# Patient Record
Sex: MALE | Race: WHITE | NOT HISPANIC OR LATINO | Employment: OTHER | URBAN - METROPOLITAN AREA
[De-identification: names, ages, dates, MRNs, and addresses within clinical notes are randomized per-mention and may not be internally consistent; named-entity substitution may affect disease eponyms.]

---

## 2018-05-07 LAB
ALBUMIN SERPL BCP-MCNC: 4.1 G/DL (ref 3.5–5.7)
ALP SERPL-CCNC: 41 IU/L (ref 55–165)
ALT SERPL W P-5'-P-CCNC: 27 IU/L (ref 10–35)
ANION GAP SERPL CALCULATED.3IONS-SCNC: 13.4 MM/L
AST SERPL W P-5'-P-CCNC: 21 U/L (ref 8–27)
BACTERIA UR QL AUTO: ABNORMAL /HPF
BILIRUB SERPL-MCNC: 0.5 MG/DL (ref 0.3–1)
BILIRUB UR QL STRIP: NEGATIVE
BILIRUBIN DIRECT (HISTORICAL): 0.1 MG/DL (ref 0–0.2)
BUN SERPL-MCNC: 14 MG/DL (ref 7–25)
CALCIUM SERPL-MCNC: 9.4 MG/DL (ref 8.6–10.5)
CHLORIDE SERPL-SCNC: 107 MM/L (ref 98–107)
CHOLEST SERPL-MCNC: 132 MG/DL (ref 0–200)
CLARITY UR: CLEAR
CO2 SERPL-SCNC: 25 MM/L (ref 21–31)
COLOR UR: YELLOW
CREAT SERPL-MCNC: 0.88 MG/DL (ref 0.7–1.3)
EGFR (HISTORICAL): > 60 GFR
EGFR AFRICAN AMERICAN (HISTORICAL): > 60 GFR
GLUCOSE (HISTORICAL): 107 MG/DL (ref 65–99)
GLUCOSE UR STRIP-MCNC: NEGATIVE MG/DL
HDLC SERPL-MCNC: 37 MG/DL (ref 40–60)
HGB UR QL STRIP.AUTO: ABNORMAL
KETONES UR STRIP-MCNC: NEGATIVE MG/DL
LDLC SERPL CALC-MCNC: 74.5 MG/DL (ref 75–193)
LEUKOCYTE ESTERASE UR QL STRIP: NEGATIVE
NITRITE UR QL STRIP: NEGATIVE
NON-SQ EPI CELLS URNS QL MICRO: ABNORMAL /LPF
OSMOLALITY, SERUM (HISTORICAL): 282 MOSM (ref 262–291)
PH UR STRIP.AUTO: 6 [PH] (ref 4.5–8)
POTASSIUM SERPL-SCNC: 4.4 MM/L (ref 3.5–5.5)
PROSTATE SPECIFIC ANTIGEN FREE (HISTORICAL): 1.85 NG/ML (ref 0.2–4.9)
PROSTATE SPECIFIC ANTIGEN PERCENT FREE (HISTORICAL): 27.6 %
PROT UR STRIP-MCNC: NEGATIVE MG/DL
PSA (HISTORICAL): 6.71 NG/ML (ref 0–4)
RBC #/AREA URNS AUTO: ABNORMAL /HPF
SODIUM SERPL-SCNC: 141 MM/L (ref 134–143)
SP GR UR STRIP.AUTO: 1.01 (ref 1–1.03)
T4 TOTAL (HISTORICAL): 7.14 UG/DL (ref 6.1–12.2)
TOTAL PROTEIN (HISTORICAL): 6.4 G/DL (ref 6.4–8.9)
TRIGL SERPL-MCNC: 101 MG/DL (ref 44–166)
TSH SERPL DL<=0.05 MIU/L-ACNC: 2.18 UIU/M (ref 0.45–5.33)
UROBILINOGEN UR QL STRIP.AUTO: 0.2 EU/DL (ref 0.2–8)
VLDL CHOLESTEROL (HISTORICAL): 20 MG/DL (ref 5–51)
WBC #/AREA URNS AUTO: ABNORMAL /HPF

## 2018-06-26 ENCOUNTER — TELEPHONE (OUTPATIENT)
Dept: FAMILY MEDICINE CLINIC | Facility: CLINIC | Age: 68
End: 2018-06-26

## 2018-06-26 DIAGNOSIS — E78.5 HYPERLIPIDEMIA, UNSPECIFIED HYPERLIPIDEMIA TYPE: Primary | ICD-10-CM

## 2018-06-26 RX ORDER — CLONAZEPAM 0.5 MG/1
0.5 TABLET, ORALLY DISINTEGRATING ORAL DAILY
Qty: 30 TABLET | Refills: 0 | Status: SHIPPED | OUTPATIENT
Start: 2018-06-26 | End: 2018-09-13 | Stop reason: SDUPTHER

## 2018-06-26 RX ORDER — SIMVASTATIN 40 MG
40 TABLET ORAL
Qty: 30 TABLET | Refills: 2 | Status: SHIPPED | OUTPATIENT
Start: 2018-06-26 | End: 2018-09-27 | Stop reason: SDUPTHER

## 2018-06-26 RX ORDER — DOCUSATE SODIUM 100 MG/1
100 CAPSULE, LIQUID FILLED ORAL DAILY
Qty: 30 CAPSULE | Refills: 0 | Status: SHIPPED | OUTPATIENT
Start: 2018-06-26 | End: 2018-11-13 | Stop reason: SDUPTHER

## 2018-06-27 DIAGNOSIS — F41.9 ANXIETY: Primary | ICD-10-CM

## 2018-06-27 RX ORDER — CLONAZEPAM 0.5 MG/1
TABLET ORAL
Qty: 30 TABLET | Refills: 0 | Status: SHIPPED | OUTPATIENT
Start: 2018-06-27 | End: 2018-08-13 | Stop reason: SDUPTHER

## 2018-08-07 PROBLEM — E78.00 HYPERCHOLESTEROLEMIA: Status: ACTIVE | Noted: 2018-08-07

## 2018-08-07 PROBLEM — N40.0 BPH (BENIGN PROSTATIC HYPERPLASIA): Status: ACTIVE | Noted: 2018-08-07

## 2018-08-07 PROBLEM — K21.9 GERD (GASTROESOPHAGEAL REFLUX DISEASE): Status: ACTIVE | Noted: 2018-08-07

## 2018-08-07 RX ORDER — OMEPRAZOLE 20 MG/1
20 CAPSULE, DELAYED RELEASE ORAL
COMMUNITY

## 2018-08-07 RX ORDER — LEVOTHYROXINE SODIUM 0.07 MG/1
75 TABLET ORAL DAILY
COMMUNITY
End: 2018-08-28 | Stop reason: SDUPTHER

## 2018-08-13 DIAGNOSIS — E78.5 HYPERLIPIDEMIA, UNSPECIFIED HYPERLIPIDEMIA TYPE: ICD-10-CM

## 2018-08-13 DIAGNOSIS — F41.9 ANXIETY: ICD-10-CM

## 2018-08-13 RX ORDER — CLONAZEPAM 0.5 MG/1
0.5 TABLET ORAL
Qty: 30 TABLET | Refills: 0 | Status: SHIPPED | OUTPATIENT
Start: 2018-08-13 | End: 2018-08-14 | Stop reason: SDUPTHER

## 2018-08-14 DIAGNOSIS — F41.9 ANXIETY: ICD-10-CM

## 2018-08-14 RX ORDER — CLONAZEPAM 0.5 MG/1
TABLET ORAL
Qty: 30 TABLET | Refills: 0 | Status: SHIPPED | OUTPATIENT
Start: 2018-08-14 | End: 2018-09-13 | Stop reason: SDUPTHER

## 2018-08-28 DIAGNOSIS — E03.9 HYPOTHYROIDISM, UNSPECIFIED TYPE: Primary | ICD-10-CM

## 2018-08-28 RX ORDER — LEVOTHYROXINE SODIUM 0.07 MG/1
75 TABLET ORAL DAILY
Qty: 30 TABLET | Refills: 3 | Status: SHIPPED | OUTPATIENT
Start: 2018-08-28 | End: 2018-12-31 | Stop reason: SDUPTHER

## 2018-09-13 DIAGNOSIS — F41.9 ANXIETY: ICD-10-CM

## 2018-09-14 RX ORDER — CLONAZEPAM 0.5 MG/1
0.5 TABLET ORAL
Qty: 30 TABLET | Refills: 1 | Status: SHIPPED | OUTPATIENT
Start: 2018-09-14 | End: 2018-11-13 | Stop reason: SDUPTHER

## 2018-09-18 ENCOUNTER — OFFICE VISIT (OUTPATIENT)
Dept: FAMILY MEDICINE CLINIC | Facility: CLINIC | Age: 68
End: 2018-09-18
Payer: MEDICARE

## 2018-09-18 VITALS
HEIGHT: 65 IN | WEIGHT: 168 LBS | HEART RATE: 92 BPM | OXYGEN SATURATION: 98 % | TEMPERATURE: 97.5 F | RESPIRATION RATE: 16 BRPM | BODY MASS INDEX: 27.99 KG/M2

## 2018-09-18 DIAGNOSIS — E78.00 HYPERCHOLESTEROLEMIA: ICD-10-CM

## 2018-09-18 DIAGNOSIS — K21.00 GASTROESOPHAGEAL REFLUX DISEASE WITH ESOPHAGITIS: Primary | ICD-10-CM

## 2018-09-18 DIAGNOSIS — N40.0 BENIGN PROSTATIC HYPERPLASIA WITHOUT LOWER URINARY TRACT SYMPTOMS: ICD-10-CM

## 2018-09-18 DIAGNOSIS — E03.9 HYPOTHYROIDISM, UNSPECIFIED TYPE: ICD-10-CM

## 2018-09-18 PROCEDURE — 99214 OFFICE O/P EST MOD 30 MIN: CPT | Performed by: INTERNAL MEDICINE

## 2018-09-18 NOTE — PROGRESS NOTES
Assessment/Plan:  Gastroesophageal reflux disease  Well controlled with Prilosec 20 mg daily  Hypercholesterolemia  Patient is on Zocor fish oil and garlic tablet no side effects are reported  Benign prostatic hyperplasia  PSA was elevated but free PSA was normal he was referred to a urologist Dr Tiffany Gan  Who felt that prostate biopsy was not necessary because free PSA was normal  Patient gets:  Exam by Dr Lauren Kapoor will be due in November of this year the lab data from May  2018 was reviewed and discussed with the patient      Diagnoses and all orders for this visit:    Gastroesophageal reflux disease with esophagitis    Benign prostatic hyperplasia without lower urinary tract symptoms    Hypercholesterolemia    Hypothyroidism, unspecified type    Other orders  -     Probiotic Product (PROBIOTIC-10) CAPS; Take by mouth        Subjective:      Patient ID: Samara Treviño is a 79 y o  male      44-year-old white male is coming in for a routine follow-up visit  Patient has BPH and is PSA was elevated in May he was referred to Dr Townsend Flies the urologist the free PSA is normal he did not feel any in his necessity for prostate biopsy patient has no symptoms of prostatism at this point        The following portions of the patient's history were reviewed and updated as appropriate: allergies, current medications, past family history, past medical history, past social history, past surgical history and problem list       Current Outpatient Prescriptions:     aspirin 81 MG tablet, Take 81 mg by mouth, Disp: , Rfl:     clonazePAM (KlonoPIN) 0 5 mg tablet, Take 1 tablet (0 5 mg total) by mouth daily at bedtime, Disp: 30 tablet, Rfl: 1    docusate sodium (COLACE) 100 mg capsule, Take 1 capsule (100 mg total) by mouth daily, Disp: 30 capsule, Rfl: 0    GARLIC PO, Take by mouth daily, Disp: , Rfl:     levothyroxine 75 mcg tablet, Take 1 tablet (75 mcg total) by mouth daily, Disp: 30 tablet, Rfl: 3    Multiple Vitamins-Minerals (MULTIVITAMIN PO), Take by mouth daily, Disp: , Rfl:     Omega-3 Fatty Acids (FISH OIL PO), Take by mouth daily, Disp: , Rfl:     omeprazole (PriLOSEC) 20 mg delayed release capsule, Take 20 mg by mouth, Disp: , Rfl:     Probiotic Product (PROBIOTIC-10) CAPS, Take by mouth, Disp: , Rfl:     simvastatin (ZOCOR) 40 mg tablet, Take 1 tablet (40 mg total) by mouth daily at bedtime, Disp: 30 tablet, Rfl: 2    Past Medical History:   Diagnosis Date    Benign neoplasm of colon     Chronic rhinitis     Colon polyp     Diverticular disease of colon     Dysphagia     GERD (gastroesophageal reflux disease)     Hyperlipidemia     Internal hemorrhoids without complication     PSA (psoriatic arthritis) (HCC)     elevated    Restless leg syndrome     Thyroid nodule        Past Surgical History:   Procedure Laterality Date    COLONOSCOPY  09/22/2010    COLONOSCOPY  06/13/2007    COLONOSCOPY  05/27/2004    COLONOSCOPY  12/09/2015    GALLBLADDER SURGERY      OTHER SURGICAL HISTORY  11/2012    abcessed tooth    THYROIDECTOMY      TONSILLECTOMY      UPPER GASTROINTESTINAL ENDOSCOPY  10/30/2013       Social History       Patient Active Problem List   Diagnosis    GERD (gastroesophageal reflux disease)    Hypercholesterolemia    BPH (benign prostatic hyperplasia)           Review of Systems   Constitutional: Negative  HENT: Negative  Eyes: Negative  Respiratory: Negative  Cardiovascular: Negative  Gastrointestinal: Negative  Endocrine: Negative  Genitourinary: Negative  Musculoskeletal: Negative  Skin: Negative  Allergic/Immunologic: Negative  Neurological: Negative  Hematological: Negative  Psychiatric/Behavioral: Negative            Objective:      Pulse 92   Temp 97 5 °F (36 4 °C) (Tympanic)   Resp 16   Ht 5' 5" (1 651 m)   Wt 76 2 kg (168 lb)   SpO2 98%   BMI 27 96 kg/m²          Physical Exam   Constitutional: He is oriented to person, place, and time  He appears well-developed and well-nourished  HENT:   Head: Normocephalic  Mouth/Throat: Oropharynx is clear and moist    Eyes: Conjunctivae are normal  Pupils are equal, round, and reactive to light  Neck: Normal range of motion  Neck supple  Cardiovascular: Normal rate and normal heart sounds  Pulmonary/Chest: Effort normal and breath sounds normal    Abdominal: Soft  Bowel sounds are normal    Musculoskeletal: Normal range of motion  Neurological: He is alert and oriented to person, place, and time  Skin: Skin is warm and dry  No visits with results within 4 Month(s) from this visit  Latest known visit with results is:   Orders Only on 05/07/2018   Component Date Value Ref Range Status    Bacteria, UA 05/07/2018 FEW* NONE SEEN /HPF Final    WBC, UA 05/07/2018 NONE SEEN  U0-2;NONE /HPF Final    Color, UA 05/07/2018 YELLOW   Final    Clarity, UA 05/07/2018 CLEAR   Final    Specific Gravity, UA 05/07/2018 1 010  1 003 - 1 035 Final    pH, UA 05/07/2018 6 0  4 5 - 8 0 Final    Glucose, UA 05/07/2018 NEGATIVE  NEGATIVE;N Final    Bilirubin, UA 05/07/2018 NEGATIVE  NEGATIVE;N Final    Ketones, UA 05/07/2018 NEGATIVE  N;NEG;NEGATIVE Final    Protein, UA 05/07/2018 NEGATIVE  NEGATIVE;N Final    Urobilinogen, UA 05/07/2018 0 2  0 2 - 8 0 EU/DL Final    Nitrite, UA 05/07/2018 NEGATIVE  NEGATIVE;N;NEG Final    Blood, UA 05/07/2018 TRACE* NEGATIVE;N;NEG Final    Leukocytes, UA 05/07/2018 NEGATIVE  NEGATIVE;N;NEG Final    RBC, UA 05/07/2018 0 - 2  U0-2;NONE /HPF Final    Epithelial Cells 05/07/2018 FEW  NONE;FEW /LPF Final    Glucose 05/07/2018 107* 65 - 99 MG/DL Final    Comment: ADA fasting glucose recommendations:   Normal: 65-99; Impaired: 100-125;  Diagnostic for Diabetes mellitus: > 125; Target for Diabetes mellitus:       BUN 05/07/2018 14  7 - 25 MG/DL Final    Creatinine 05/07/2018 0 88  0 7 - 1 3 MG/DL Final    Comment: IDMS method performed  The drugs Metamizole, Sulfasalazine, and Sulfapyridine may interfere and  result in false low results   Sodium 05/07/2018 141  134 - 143 MM/L Final    Please note: Reference range change based on patient population   Potassium 05/07/2018 4 4  3 5 - 5 5 MM/L Final    Please note: Reference range change based on patient population   Chloride 05/07/2018 107  98 - 107 MM/L Final    CO2 05/07/2018 25  21 0 - 31 0 MM/L Final    Calcium 05/07/2018 9 4  8 6 - 10 5 MG/DL Final    Please note: Reference range change based on patient population   Anion Gap 05/07/2018 13 4  MM/L Final    OSMOLALITY, SERUM 05/07/2018 282  262 - 291 mOsm Final    EGFR (HISTORICAL) 05/07/2018 > 60  >60 GFR Final    Comment: This calculation follows the MDRD guidelines  Units are in mL/min/1 73 sq meters      eGFR African American 05/07/2018 > 60  >60 GFR Final    Total Protein 05/07/2018 6 4  6 4 - 8 9 G/DL Final    Albumin 05/07/2018 4 1  3 5 - 5 7 G/DL Final    Total Bilirubin 05/07/2018 0 5  0 3 - 1 0 MG/DL Final    BILIRUBIN DIRECT 05/07/2018 0 1  0 0 - 0 2 MG/DL Final    AST 05/07/2018 21  8 - 27 U/L Final    ALT 05/07/2018 27  10 - 35 IU/L Final    Alkaline Phosphatase 05/07/2018 41* 55 - 165 IU/L Final    Cholesterol 05/07/2018 132  0 - 200 MG/DL Final    Comment: <200----------------------------DESIRABLE  200 - 239---------------------BORDERLINE HIGH  240 OR GREATER-----HIGH  The drugs Metamizole, Sulfasalazine, and Sulfapyridine may interfere and  result in false low results   Triglycerides 05/07/2018 101  44 - 166 MG/DL Final    Comment: The drugs Metamizole, Sulfasalazine, and Sulfapyridine may interfere and  result in false low results        VLDL CHOLESTEROL 05/07/2018 20 0  5 - 51 MG/DL Final    HDL 05/07/2018 37* 40 - 60 MG/DL Final    Comment: The drugs Metamizole, Sulfasalazine, and Sulfapyridine may interfere and  result in false low results   LDL Calculated 05/07/2018 74 5* 75 - 193 MG/DL Final    TSH 3RD GENERATON 05/07/2018 2 18  0 45 - 5 33 UIU/M Final    T4 TOTAL 05/07/2018 7 14  6 1 - 12 2 UG/DL Final    PSA 05/07/2018 6 71* 0 0 - 4 0 NG/ML Final    PROSTATE SPECIFIC ANTIGEN FREE 05/07/2018 1 85  0 2 - 4 9 NG/ML Final    PROSTATE SPECIFIC ANTIGEN PERCENT * 05/07/2018 27 6  % Final       No results found

## 2018-09-24 ENCOUNTER — CLINICAL SUPPORT (OUTPATIENT)
Dept: FAMILY MEDICINE CLINIC | Facility: CLINIC | Age: 68
End: 2018-09-24
Payer: MEDICARE

## 2018-09-24 DIAGNOSIS — Z23 NEEDS FLU SHOT: Primary | ICD-10-CM

## 2018-09-24 PROCEDURE — 90662 IIV NO PRSV INCREASED AG IM: CPT

## 2018-09-24 PROCEDURE — G0008 ADMIN INFLUENZA VIRUS VAC: HCPCS

## 2018-09-27 DIAGNOSIS — E78.5 HYPERLIPIDEMIA, UNSPECIFIED HYPERLIPIDEMIA TYPE: ICD-10-CM

## 2018-09-28 RX ORDER — SIMVASTATIN 40 MG
40 TABLET ORAL
Qty: 30 TABLET | Refills: 2 | Status: SHIPPED | OUTPATIENT
Start: 2018-09-28 | End: 2019-05-09 | Stop reason: SDUPTHER

## 2018-11-08 ENCOUNTER — APPOINTMENT (OUTPATIENT)
Dept: LAB | Facility: HOSPITAL | Age: 68
End: 2018-11-08
Attending: INTERNAL MEDICINE
Payer: MEDICARE

## 2018-11-08 ENCOUNTER — TRANSCRIBE ORDERS (OUTPATIENT)
Dept: ADMINISTRATIVE | Facility: HOSPITAL | Age: 68
End: 2018-11-08

## 2018-11-08 DIAGNOSIS — R79.89 LOW THYROID STIMULATING HORMONE (TSH) LEVEL: Primary | ICD-10-CM

## 2018-11-08 DIAGNOSIS — R79.89 LOW THYROID STIMULATING HORMONE (TSH) LEVEL: ICD-10-CM

## 2018-11-08 LAB
ALBUMIN SERPL BCP-MCNC: 4.2 G/DL (ref 3.5–5.7)
ALP SERPL-CCNC: 47 U/L (ref 55–165)
ALT SERPL W P-5'-P-CCNC: 30 U/L (ref 7–52)
ANION GAP SERPL CALCULATED.3IONS-SCNC: 8 MMOL/L (ref 4–13)
AST SERPL W P-5'-P-CCNC: 24 U/L (ref 13–39)
BILIRUB DIRECT SERPL-MCNC: 0.1 MG/DL (ref 0–0.2)
BILIRUB SERPL-MCNC: 0.8 MG/DL (ref 0.2–1)
BUN SERPL-MCNC: 16 MG/DL (ref 7–25)
CALCIUM SERPL-MCNC: 9.3 MG/DL (ref 8.6–10.5)
CHLORIDE SERPL-SCNC: 105 MMOL/L (ref 98–107)
CHOLEST SERPL-MCNC: 130 MG/DL (ref 0–200)
CO2 SERPL-SCNC: 25 MMOL/L (ref 21–31)
CREAT SERPL-MCNC: 1 MG/DL (ref 0.7–1.3)
GFR SERPL CREATININE-BSD FRML MDRD: 78 ML/MIN/1.73SQ M
GLUCOSE P FAST SERPL-MCNC: 107 MG/DL (ref 65–99)
HDLC SERPL-MCNC: 34 MG/DL (ref 40–60)
LDLC SERPL CALC-MCNC: 76 MG/DL (ref 0–100)
NONHDLC SERPL-MCNC: 96 MG/DL
POTASSIUM SERPL-SCNC: 4.2 MMOL/L (ref 3.5–5.5)
PROT SERPL-MCNC: 6.8 G/DL (ref 6.4–8.9)
SODIUM SERPL-SCNC: 138 MMOL/L (ref 134–143)
T4 SERPL-MCNC: 6.9 UG/DL (ref 4.7–13.3)
TRIGL SERPL-MCNC: 101 MG/DL (ref 44–166)
TSH SERPL DL<=0.05 MIU/L-ACNC: 2.61 UIU/ML (ref 0.45–5.33)

## 2018-11-08 PROCEDURE — 84436 ASSAY OF TOTAL THYROXINE: CPT

## 2018-11-08 PROCEDURE — 36415 COLL VENOUS BLD VENIPUNCTURE: CPT

## 2018-11-08 PROCEDURE — 80048 BASIC METABOLIC PNL TOTAL CA: CPT

## 2018-11-08 PROCEDURE — 80076 HEPATIC FUNCTION PANEL: CPT

## 2018-11-08 PROCEDURE — 80061 LIPID PANEL: CPT

## 2018-11-08 PROCEDURE — 84443 ASSAY THYROID STIM HORMONE: CPT

## 2018-11-13 DIAGNOSIS — F41.9 ANXIETY: ICD-10-CM

## 2018-11-13 DIAGNOSIS — E78.5 HYPERLIPIDEMIA, UNSPECIFIED HYPERLIPIDEMIA TYPE: ICD-10-CM

## 2018-11-13 RX ORDER — DOCUSATE SODIUM 100 MG/1
100 CAPSULE, LIQUID FILLED ORAL DAILY
Qty: 30 CAPSULE | Refills: 3 | Status: SHIPPED | OUTPATIENT
Start: 2018-11-13 | End: 2019-03-07 | Stop reason: SDUPTHER

## 2018-11-13 RX ORDER — CLONAZEPAM 0.5 MG/1
0.5 TABLET ORAL
Qty: 30 TABLET | Refills: 0 | Status: SHIPPED | OUTPATIENT
Start: 2018-11-13 | End: 2018-12-11 | Stop reason: SDUPTHER

## 2018-12-11 DIAGNOSIS — F41.9 ANXIETY: ICD-10-CM

## 2018-12-11 RX ORDER — CLONAZEPAM 0.5 MG/1
0.5 TABLET ORAL
Qty: 30 TABLET | Refills: 0 | Status: SHIPPED | OUTPATIENT
Start: 2018-12-11 | End: 2019-01-10 | Stop reason: SDUPTHER

## 2018-12-19 ENCOUNTER — OFFICE VISIT (OUTPATIENT)
Dept: FAMILY MEDICINE CLINIC | Facility: CLINIC | Age: 68
End: 2018-12-19
Payer: MEDICARE

## 2018-12-19 VITALS
SYSTOLIC BLOOD PRESSURE: 120 MMHG | HEIGHT: 65 IN | RESPIRATION RATE: 16 BRPM | BODY MASS INDEX: 27.99 KG/M2 | WEIGHT: 168 LBS | HEART RATE: 89 BPM | OXYGEN SATURATION: 99 % | TEMPERATURE: 97.8 F | DIASTOLIC BLOOD PRESSURE: 68 MMHG

## 2018-12-19 DIAGNOSIS — Z23 PNEUMOCOCCAL VACCINATION GIVEN: ICD-10-CM

## 2018-12-19 DIAGNOSIS — R53.83 FATIGUE, UNSPECIFIED TYPE: ICD-10-CM

## 2018-12-19 DIAGNOSIS — K21.00 GASTROESOPHAGEAL REFLUX DISEASE WITH ESOPHAGITIS: ICD-10-CM

## 2018-12-19 DIAGNOSIS — N40.0 BENIGN PROSTATIC HYPERPLASIA WITHOUT LOWER URINARY TRACT SYMPTOMS: ICD-10-CM

## 2018-12-19 DIAGNOSIS — Z29.9 PREVENTIVE MEASURE: ICD-10-CM

## 2018-12-19 DIAGNOSIS — Z11.59 ENCOUNTER FOR HEPATITIS C SCREENING TEST FOR LOW RISK PATIENT: Primary | ICD-10-CM

## 2018-12-19 DIAGNOSIS — E78.00 HYPERCHOLESTEROLEMIA: ICD-10-CM

## 2018-12-19 DIAGNOSIS — Z23 NEED FOR PROPHYLACTIC VACCINATION AGAINST DIPHTHERIA-TETANUS-PERTUSSIS (DTP): ICD-10-CM

## 2018-12-19 PROCEDURE — 99214 OFFICE O/P EST MOD 30 MIN: CPT | Performed by: INTERNAL MEDICINE

## 2018-12-19 PROCEDURE — G0009 ADMIN PNEUMOCOCCAL VACCINE: HCPCS

## 2018-12-19 PROCEDURE — 90670 PCV13 VACCINE IM: CPT

## 2018-12-19 PROCEDURE — 90471 IMMUNIZATION ADMIN: CPT

## 2018-12-19 PROCEDURE — 90715 TDAP VACCINE 7 YRS/> IM: CPT

## 2018-12-19 RX ORDER — CLOBETASOL PROPIONATE 0.5 MG/G
OINTMENT TOPICAL AS NEEDED
COMMUNITY

## 2018-12-19 NOTE — PROGRESS NOTES
Vitals:    12/19/18 0804   BP: 120/68   BP Location: Right arm   Patient Position: Sitting   Cuff Size: Adult   Pulse: 89   Resp: 16   Temp: 97 8 °F (36 6 °C)   TempSrc: Tympanic   SpO2: 99%   Weight: 76 2 kg (168 lb)   Height: 5' 5" (1 651 m)       Assessment/Plan:fatigue nonspecific  Will get a CBC CMP lipid panel Lyme disease antibody  Hypercholesterolemia patient is on Zocor  Hypothyroidism patient is on levothyroxine 75 mcg daily  Benign prostatic hypertrophy follows up with the urologist    There are no diagnoses linked to this encounter  Subjective:      Patient ID: Maira Garcia is a 79 y o  male      HPI 53-year-old white male is complaining of generalized fatigue for the past 2 months no abdominal pain no chest pain no shortness of breathing no rectal bleeding no lymph node enlargement no fevers no insect bite or any tick bite just sleeps too much after 8 hr of sleep at night he likes to sleep in the daytime as well is here for annual Wellness visit also    The following portions of the patient's history were reviewed and updated as appropriate: allergies, current medications, past family history, past medical history, past social history, past surgical history and problem list       Current Outpatient Prescriptions:     aspirin 81 MG tablet, Take 81 mg by mouth, Disp: , Rfl:     CALCIUM/MAGNESIUM/ZINC FORMULA PO, Take 1 tablet by mouth daily, Disp: , Rfl:     clobetasol (TEMOVATE) 0 05 % ointment, Apply topically as needed, Disp: , Rfl:     clonazePAM (KlonoPIN) 0 5 mg tablet, Take 1 tablet (0 5 mg total) by mouth daily at bedtime, Disp: 30 tablet, Rfl: 0    docusate sodium (COLACE) 100 mg capsule, Take 1 capsule (100 mg total) by mouth daily, Disp: 30 capsule, Rfl: 3    GARLIC PO, Take by mouth daily, Disp: , Rfl:     levothyroxine 75 mcg tablet, Take 1 tablet (75 mcg total) by mouth daily, Disp: 30 tablet, Rfl: 3    Multiple Vitamins-Minerals (MULTIVITAMIN PO), Take by mouth daily, Disp: , Rfl:     Omega-3 Fatty Acids (FISH OIL PO), Take by mouth daily, Disp: , Rfl:     omeprazole (PriLOSEC) 20 mg delayed release capsule, Take 20 mg by mouth, Disp: , Rfl:     Probiotic Product (PROBIOTIC-10) CAPS, Take by mouth, Disp: , Rfl:     simvastatin (ZOCOR) 40 mg tablet, Take 1 tablet (40 mg total) by mouth daily at bedtime, Disp: 30 tablet, Rfl: 2    Past Medical History:   Diagnosis Date    Benign neoplasm of colon     Chronic rhinitis     Colon polyp     Diverticular disease of colon     Dysphagia     GERD (gastroesophageal reflux disease)     Hyperlipidemia     Internal hemorrhoids without complication     PSA (psoriatic arthritis) (HCC)     elevated    Restless leg syndrome     Thyroid nodule        Past Surgical History:   Procedure Laterality Date    COLONOSCOPY  09/22/2010    COLONOSCOPY  06/13/2007    COLONOSCOPY  05/27/2004    COLONOSCOPY  12/09/2015    GALLBLADDER SURGERY      OTHER SURGICAL HISTORY  11/2012    abcessed tooth    THYROIDECTOMY      TONSILLECTOMY      UPPER GASTROINTESTINAL ENDOSCOPY  10/30/2013       Social History       Patient Active Problem List   Diagnosis    GERD (gastroesophageal reflux disease)    Hypercholesterolemia    BPH (benign prostatic hyperplasia)           Review of Systems   Constitutional: Negative  HENT: Negative  Eyes: Negative  Respiratory: Negative  Cardiovascular: Negative  Gastrointestinal: Negative  Endocrine: Negative  Genitourinary: Negative  Musculoskeletal: Negative  Skin: Negative  Allergic/Immunologic: Negative  Neurological: Negative  Hematological: Negative  Psychiatric/Behavioral: Negative            Objective:      /68 (BP Location: Right arm, Patient Position: Sitting, Cuff Size: Adult)   Pulse 89   Temp 97 8 °F (36 6 °C) (Tympanic)   Resp 16   Ht 5' 5" (1 651 m)   Wt 76 2 kg (168 lb)   SpO2 99%   BMI 27 96 kg/m²          Physical Exam   Constitutional: He is oriented to person, place, and time  He appears well-developed and well-nourished  HENT:   Head: Normocephalic  Mouth/Throat: Oropharynx is clear and moist    Eyes: Pupils are equal, round, and reactive to light  Conjunctivae are normal    Neck: Normal range of motion  Neck supple  Cardiovascular: Normal rate and normal heart sounds  Pulmonary/Chest: Effort normal and breath sounds normal    Abdominal: Soft  Bowel sounds are normal    Musculoskeletal: Normal range of motion  Neurological: He is alert and oriented to person, place, and time  Skin: Skin is warm and dry  Appointment on 11/08/2018   Component Date Value Ref Range Status    Sodium 11/08/2018 138  134 - 143 mmol/L Final    Potassium 11/08/2018 4 2  3 5 - 5 5 mmol/L Final    Chloride 11/08/2018 105  98 - 107 mmol/L Final    CO2 11/08/2018 25  21 - 31 mmol/L Final    ANION GAP 11/08/2018 8  4 - 13 mmol/L Final    BUN 11/08/2018 16  7 - 25 mg/dL Final    Creatinine 11/08/2018 1 00  0 70 - 1 30 mg/dL Final    Standardized to IDMS reference method    Glucose, Fasting 11/08/2018 107* 65 - 99 mg/dL Final      Specimen collection should occur prior to Sulfasalazine administration due to the potential for falsely depressed results  Specimen collection should occur prior to Sulfapyridine administration due to the potential for falsely elevated results      Calcium 11/08/2018 9 3  8 6 - 10 5 mg/dL Final    eGFR 11/08/2018 78  ml/min/1 73sq m Final    Cholesterol 11/08/2018 130  0 - 200 mg/dL Final      Cholesterol:       Desirable         <200 mg/dl       Borderline         200-239 mg/dl       High              >239           Triglycerides 11/08/2018 101  44 - 166 mg/dL Final      Triglyceride:     Normal          <150 mg/dl     Borderline High 150-199 mg/dl     High            200-499 mg/dl        Very High       >499 mg/dl    Specimen collection should occur prior to N-Acetylcysteine or Metamizole administration due to the potential for falsely depressed results   HDL, Direct 11/08/2018 34* 40 - 60 mg/dL Final      HDL Cholesterol:       High    >60 mg/dL       Low     <41 mg/dL  Specimen collection should occur prior to Metamizole administration due to the potential for falsley depressed results   LDL Calculated 11/08/2018 76  0 - 100 mg/dL Final      LDL Cholesterol:     Optimal           <100 mg/dl     Near Optimal      100-129 mg/dl     Above Optimal       Borderline High 130-159 mg/dl       High            160-189 mg/dl       Very High       >189 mg/dl         This screening LDL is a calculated result  It does not have the accuracy of the Direct Measured LDL in the monitoring of patients with hyperlipidemia and/or statin therapy  Direct Measure LDL (BHC733) must be ordered separately in these patients   Non-HDL-Chol (CHOL-HDL) 11/08/2018 96  mg/dl Final    TSH 3RD GENERATON 11/08/2018 2 610  0 450 - 5 330 uIU/mL Final    Using supplements with high doses of biotin 20 to more than 300 times greater than the adequate daily intake for adults of 30 mcg/day as established by the [employer], can cause falsely depress results   Total Bilirubin 11/08/2018 0 80  0 20 - 1 00 mg/dL Final    Bilirubin, Direct 11/08/2018 0 10  0 00 - 0 20 mg/dL Final    Alkaline Phosphatase 11/08/2018 47* 55 - 165 U/L Final    AST 11/08/2018 24  13 - 39 U/L Final      Specimen collection should occur prior to Sulfasalazine administration due to the potential for falsely depressed results   ALT 11/08/2018 30  7 - 52 U/L Final      Specimen collection should occur prior to Sulfasalazine administration due to the potential for falsely depressed results   Total Protein 11/08/2018 6 8  6 4 - 8 9 g/dL Final    Albumin 11/08/2018 4 2  3 5 - 5 7 g/dL Final    T4 TOTAL 11/08/2018 6 9  4 7 - 13 3 ug/dL Final       No results found      Social History     Social History Narrative    Consumes on average 3 cups of regular coffee per day        Family History   Problem Relation Age of Onset    Colon cancer Father     Other Father         metastatic: lymph tissue, breast, thyroid, prostate, liver, lung    Other Family         malignant neoplasm of gastrointestinal disease       Past Surgical History:   Procedure Laterality Date    COLONOSCOPY  09/22/2010    COLONOSCOPY  06/13/2007    COLONOSCOPY  05/27/2004    COLONOSCOPY  12/09/2015    GALLBLADDER SURGERY      OTHER SURGICAL HISTORY  11/2012    abcessed tooth    THYROIDECTOMY      TONSILLECTOMY      UPPER GASTROINTESTINAL ENDOSCOPY  10/30/2013       Allergies   Allergen Reactions    Codeine

## 2018-12-19 NOTE — PROGRESS NOTES
Assessment and Plan:    Problem List Items Addressed This Visit     None        Health Maintenance Due   Topic Date Due    Hepatitis C Screening  1950    Depression Screening PHQ  1950    Medicare Annual Wellness Visit (AWV)  1950    DXA SCAN  1950    DTaP,Tdap,and Td Vaccines (1 - Tdap) 05/24/1973    CRC Screening: FOBTx3/FIT  05/24/2002    Fall Risk  05/24/2017    Pneumococcal PPSV23/PCV13 65+ Years / Low and Medium Risk (1 of 2 - PCV13) 05/24/2017         HPI:  Darryl Ward is a 79 y o  male here for his Subsequent Wellness Visit      Patient Active Problem List   Diagnosis    GERD (gastroesophageal reflux disease)    Hypercholesterolemia    BPH (benign prostatic hyperplasia)     Past Medical History:   Diagnosis Date    Benign neoplasm of colon     Chronic rhinitis     Colon polyp     Diverticular disease of colon     Dysphagia     GERD (gastroesophageal reflux disease)     Hyperlipidemia     Internal hemorrhoids without complication     PSA (psoriatic arthritis) (HCC)     elevated    Restless leg syndrome     Thyroid nodule      Past Surgical History:   Procedure Laterality Date    COLONOSCOPY  09/22/2010    COLONOSCOPY  06/13/2007    COLONOSCOPY  05/27/2004    COLONOSCOPY  12/09/2015    GALLBLADDER SURGERY      OTHER SURGICAL HISTORY  11/2012    abcessed tooth    THYROIDECTOMY      TONSILLECTOMY      UPPER GASTROINTESTINAL ENDOSCOPY  10/30/2013     Family History   Problem Relation Age of Onset    Colon cancer Father     Other Father         metastatic: lymph tissue, breast, thyroid, prostate, liver, lung    Other Family         malignant neoplasm of gastrointestinal disease     History   Smoking Status    Never Smoker   Smokeless Tobacco    Never Used     History   Alcohol Use No      History   Drug Use No       Current Outpatient Prescriptions   Medication Sig Dispense Refill    aspirin 81 MG tablet Take 81 mg by mouth      CALCIUM/MAGNESIUM/ZINC FORMULA PO Take 1 tablet by mouth daily      clobetasol (TEMOVATE) 0 05 % ointment Apply topically as needed      clonazePAM (KlonoPIN) 0 5 mg tablet Take 1 tablet (0 5 mg total) by mouth daily at bedtime 30 tablet 0    docusate sodium (COLACE) 100 mg capsule Take 1 capsule (100 mg total) by mouth daily 30 capsule 3    GARLIC PO Take by mouth daily      levothyroxine 75 mcg tablet Take 1 tablet (75 mcg total) by mouth daily 30 tablet 3    Multiple Vitamins-Minerals (MULTIVITAMIN PO) Take by mouth daily      Omega-3 Fatty Acids (FISH OIL PO) Take by mouth daily      omeprazole (PriLOSEC) 20 mg delayed release capsule Take 20 mg by mouth      Probiotic Product (PROBIOTIC-10) CAPS Take by mouth      simvastatin (ZOCOR) 40 mg tablet Take 1 tablet (40 mg total) by mouth daily at bedtime 30 tablet 2     No current facility-administered medications for this visit  Allergies   Allergen Reactions    Codeine      Immunization History   Administered Date(s) Administered    Influenza 12/05/2017    Influenza, high dose seasonal 0 5 mL 09/24/2018       Patient Care Team:  Vi Powers MD as PCP - General (Internal Medicine)    Medicare Screening Tests and Risk Assessments:  Last Medicare Wellness visit information reviewed, patient interviewed, no change since last AWV  Health Risk Assessment:  Patient rates overall health as good  Patient feels that their physical health rating is Same  Eyesight was rated as Same  Hearing was rated as Same  Patient feels that their emotional and mental health rating is Same  Pain experienced by patient in the last 7 days has been None  Patient states that he has experienced no weight loss or gain in last 6 months  Emotional/Mental Health:  Patient has been feeling nervous/anxious  PHQ-9 Depression Screening:    Frequency of the following problems over the past two weeks:      1   Little interest or pleasure in doing things: 0 - not at all 2  Feeling down, depressed, or hopeless: 0 - not at all  PHQ-2 Score: 0          Broken Bones/Falls: Fall Risk Assessment:    In the past year, patient has experienced: No history of falling in past year          Bladder/Bowel:  Patient has not leaked urine accidently in the last six months  Patient reports no loss of bowel control  Immunizations:  Patient has had a flu vaccination within the last year  Patient has not received a pneumonia shot  Patient has received a shingles shot  Patient has not received tetanus/diphtheria shot  Home Safety:  Patient does not have trouble with stairs inside or outside of their home  Patient currently reports that there are no safety hazards present in home, working smoke alarms, working carbon monoxide detectors  Preventative Screenings:   prostate cancer screen performed, colon cancer screen completed, cholesterol screen completed, no glaucoma eye exam completed    Nutrition:  Current diet: Regular with servings of the following:    Medications:  Patient is currently taking over-the-counter supplements  Patient is able to manage medications  Lifestyle Choices:  Patient reports no tobacco use  Patient has not smoked or used tobacco in the past   Patient reports no alcohol use  Patient drives a vehicle  Patient wears seat belt  Activities of Daily Living:  Can get out of bed by his or her self, able to dress self, able to make own meals, able to do own shopping, able to bathe self, can do own laundry/housekeeping, can manage own money, pay bills and track expenses    Previous Hospitalizations:  No hospitalization or ED visit in past 12 months        Advanced Directives:  Patient has decided on a power of   Patient has spoken to designated power of   Patient has completed advanced directive          Preventative Screening/Counseling:      Cardiovascular:      Counseling: Healthy Diet, Healthy Weight, Improve Cholesterol, Improve Blood Pressure and Improve Exercise Tolerance          Diabetes:      Counseling: Healthy Diet, Healthy Weight and Improve Physical Activity          Colorectal Cancer:      General: Screening Current      Comments: Colonoscopy 2 years ago by [facility] gastroenterology        Prostate Cancer:      General: Screening Current      Comments: Sees Dr jarquin and gets a PSA through his office        Osteoporosis:      General: Screening Not Indicated          AAA:      General: Risks and Benefits Discussed      Study: Screening US          Glaucoma:      General: Screening Current      Comments: Sees Dr Hakan Tovar the eye doctor had a cataract removed left eye in 2018        HIV:      General: Screening Not Indicated          Hepatitis C:      General: Risks and Benefits Discussed      Counseling: has received general HCV counseling        Advanced Directives:   Patient has living will for healthcare, has durable POA for healthcare, Information on ACP and/or AD provided  End of life assessment reviewed with patient  Provider agrees with end of life decisions

## 2018-12-31 ENCOUNTER — APPOINTMENT (OUTPATIENT)
Dept: LAB | Facility: HOSPITAL | Age: 68
End: 2018-12-31
Attending: INTERNAL MEDICINE
Payer: MEDICARE

## 2018-12-31 ENCOUNTER — TRANSCRIBE ORDERS (OUTPATIENT)
Dept: ADMINISTRATIVE | Facility: HOSPITAL | Age: 68
End: 2018-12-31

## 2018-12-31 DIAGNOSIS — R73.01 IMPAIRED FASTING GLUCOSE: Primary | ICD-10-CM

## 2018-12-31 DIAGNOSIS — E03.9 HYPOTHYROIDISM, UNSPECIFIED TYPE: ICD-10-CM

## 2018-12-31 DIAGNOSIS — R53.83 FATIGUE, UNSPECIFIED TYPE: ICD-10-CM

## 2018-12-31 DIAGNOSIS — Z11.59 ENCOUNTER FOR HEPATITIS C SCREENING TEST FOR LOW RISK PATIENT: ICD-10-CM

## 2018-12-31 LAB
ALBUMIN SERPL BCP-MCNC: 4.1 G/DL (ref 3.5–5.7)
ALP SERPL-CCNC: 48 U/L (ref 55–165)
ALT SERPL W P-5'-P-CCNC: 33 U/L (ref 7–52)
ANION GAP SERPL CALCULATED.3IONS-SCNC: 8 MMOL/L (ref 4–13)
AST SERPL W P-5'-P-CCNC: 25 U/L (ref 13–39)
BASOPHILS # BLD AUTO: 0 THOUSANDS/ΜL (ref 0–0.1)
BASOPHILS NFR BLD AUTO: 1 % (ref 0–1)
BILIRUB SERPL-MCNC: 0.5 MG/DL (ref 0.2–1)
BUN SERPL-MCNC: 14 MG/DL (ref 7–25)
CALCIUM SERPL-MCNC: 9.1 MG/DL (ref 8.6–10.5)
CHLORIDE SERPL-SCNC: 106 MMOL/L (ref 98–107)
CO2 SERPL-SCNC: 26 MMOL/L (ref 21–31)
CREAT SERPL-MCNC: 0.95 MG/DL (ref 0.7–1.3)
EOSINOPHIL # BLD AUTO: 0.2 THOUSAND/ΜL (ref 0–0.61)
EOSINOPHIL NFR BLD AUTO: 2 % (ref 0–6)
ERYTHROCYTE [DISTWIDTH] IN BLOOD BY AUTOMATED COUNT: 12.9 % (ref 11.6–15.1)
GFR SERPL CREATININE-BSD FRML MDRD: 83 ML/MIN/1.73SQ M
GLUCOSE P FAST SERPL-MCNC: 113 MG/DL (ref 65–99)
HCT VFR BLD AUTO: 47.9 % (ref 42–52)
HCV AB SER QL: NORMAL
HGB BLD-MCNC: 16.1 G/DL (ref 12–17)
LYMPHOCYTES # BLD AUTO: 1.8 THOUSANDS/ΜL (ref 0.6–4.47)
LYMPHOCYTES NFR BLD AUTO: 25 % (ref 14–44)
MCH RBC QN AUTO: 31.6 PG (ref 26.8–34.3)
MCHC RBC AUTO-ENTMCNC: 33.6 G/DL (ref 31.4–37.4)
MCV RBC AUTO: 94 FL (ref 82–98)
MONOCYTES # BLD AUTO: 0.6 THOUSAND/ΜL (ref 0.17–1.22)
MONOCYTES NFR BLD AUTO: 8 % (ref 4–12)
NEUTROPHILS # BLD AUTO: 4.7 THOUSANDS/ΜL (ref 1.85–7.62)
NEUTS SEG NFR BLD AUTO: 64 % (ref 43–75)
NRBC BLD AUTO-RTO: 0 /100 WBCS
PLATELET # BLD AUTO: 201 THOUSANDS/UL (ref 149–390)
PMV BLD AUTO: 7.9 FL (ref 8.9–12.7)
POTASSIUM SERPL-SCNC: 4.2 MMOL/L (ref 3.5–5.5)
PROT SERPL-MCNC: 6.4 G/DL (ref 6.4–8.9)
RBC # BLD AUTO: 5.09 MILLION/UL (ref 3.88–5.62)
SODIUM SERPL-SCNC: 140 MMOL/L (ref 134–143)
WBC # BLD AUTO: 7.3 THOUSAND/UL (ref 4.31–10.16)

## 2018-12-31 PROCEDURE — 80053 COMPREHEN METABOLIC PANEL: CPT

## 2018-12-31 PROCEDURE — 86803 HEPATITIS C AB TEST: CPT

## 2018-12-31 PROCEDURE — 85025 COMPLETE CBC W/AUTO DIFF WBC: CPT

## 2018-12-31 PROCEDURE — 36415 COLL VENOUS BLD VENIPUNCTURE: CPT

## 2018-12-31 PROCEDURE — 86618 LYME DISEASE ANTIBODY: CPT

## 2018-12-31 RX ORDER — LEVOTHYROXINE SODIUM 0.07 MG/1
75 TABLET ORAL DAILY
Qty: 30 TABLET | Refills: 2 | Status: SHIPPED | OUTPATIENT
Start: 2018-12-31 | End: 2019-04-09 | Stop reason: SDUPTHER

## 2018-12-31 NOTE — TELEPHONE ENCOUNTER
Pt called requesting a refill of Synthroid  Can you please send refill for pt to pharmacy on file? Thank you

## 2019-01-02 ENCOUNTER — TRANSCRIBE ORDERS (OUTPATIENT)
Dept: ADMINISTRATIVE | Facility: HOSPITAL | Age: 69
End: 2019-01-02

## 2019-01-02 ENCOUNTER — APPOINTMENT (OUTPATIENT)
Dept: LAB | Facility: HOSPITAL | Age: 69
End: 2019-01-02
Attending: INTERNAL MEDICINE
Payer: MEDICARE

## 2019-01-02 DIAGNOSIS — R73.01 IMPAIRED FASTING GLUCOSE: ICD-10-CM

## 2019-01-02 LAB
B BURGDOR IGG SER IA-ACNC: 0.09
B BURGDOR IGM SER IA-ACNC: 0.64
EST. AVERAGE GLUCOSE BLD GHB EST-MCNC: 131 MG/DL
HBA1C MFR BLD: 6.2 % (ref 4.2–6.3)

## 2019-01-02 PROCEDURE — 83036 HEMOGLOBIN GLYCOSYLATED A1C: CPT

## 2019-01-02 PROCEDURE — 36415 COLL VENOUS BLD VENIPUNCTURE: CPT

## 2019-01-10 DIAGNOSIS — F41.9 ANXIETY: ICD-10-CM

## 2019-01-10 RX ORDER — CLONAZEPAM 0.5 MG/1
0.5 TABLET ORAL
Qty: 30 TABLET | Refills: 0 | Status: SHIPPED | OUTPATIENT
Start: 2019-01-10 | End: 2019-02-07 | Stop reason: SDUPTHER

## 2019-02-06 ENCOUNTER — HOSPITAL ENCOUNTER (OUTPATIENT)
Dept: ULTRASOUND IMAGING | Facility: HOSPITAL | Age: 69
Discharge: HOME | End: 2019-02-06
Attending: INTERNAL MEDICINE
Payer: MEDICARE

## 2019-02-06 DIAGNOSIS — Z29.9 PREVENTIVE MEASURE: ICD-10-CM

## 2019-02-06 PROCEDURE — 76706 US ABDL AORTA SCREEN AAA: CPT

## 2019-02-07 DIAGNOSIS — F41.9 ANXIETY: ICD-10-CM

## 2019-02-07 RX ORDER — CLONAZEPAM 0.5 MG/1
0.5 TABLET ORAL
Qty: 30 TABLET | Refills: 0 | Status: SHIPPED | OUTPATIENT
Start: 2019-02-07 | End: 2019-03-07 | Stop reason: SDUPTHER

## 2019-03-07 DIAGNOSIS — F41.9 ANXIETY: ICD-10-CM

## 2019-03-07 DIAGNOSIS — E78.5 HYPERLIPIDEMIA, UNSPECIFIED HYPERLIPIDEMIA TYPE: ICD-10-CM

## 2019-03-07 RX ORDER — DOCUSATE SODIUM 100 MG/1
100 CAPSULE, LIQUID FILLED ORAL DAILY
Qty: 30 CAPSULE | Refills: 3 | Status: SHIPPED | OUTPATIENT
Start: 2019-03-07 | End: 2019-08-06 | Stop reason: SDUPTHER

## 2019-03-07 RX ORDER — CLONAZEPAM 0.5 MG/1
0.5 TABLET ORAL
Qty: 30 TABLET | Refills: 0 | Status: SHIPPED | OUTPATIENT
Start: 2019-03-07 | End: 2019-04-09 | Stop reason: SDUPTHER

## 2019-03-07 NOTE — TELEPHONE ENCOUNTER
Patient called requesting a refill of medication  Can you please send to pharmacy on file? Thank you      Requesting:    Refill on His Clonazepam 0 5mg 1 tab daily called into [facility]   Thank You kD

## 2019-03-17 ENCOUNTER — APPOINTMENT (EMERGENCY)
Dept: CT IMAGING | Facility: HOSPITAL | Age: 69
End: 2019-03-17
Payer: MEDICARE

## 2019-03-17 ENCOUNTER — HOSPITAL ENCOUNTER (EMERGENCY)
Facility: HOSPITAL | Age: 69
Discharge: HOME | End: 2019-03-17
Attending: FAMILY MEDICINE
Payer: MEDICARE

## 2019-03-17 VITALS
SYSTOLIC BLOOD PRESSURE: 146 MMHG | WEIGHT: 165 LBS | RESPIRATION RATE: 15 BRPM | BODY MASS INDEX: 27.49 KG/M2 | TEMPERATURE: 98.5 F | OXYGEN SATURATION: 97 % | DIASTOLIC BLOOD PRESSURE: 82 MMHG | HEIGHT: 65 IN | HEART RATE: 87 BPM

## 2019-03-17 DIAGNOSIS — R42 DIZZINESS: ICD-10-CM

## 2019-03-17 DIAGNOSIS — R42 VERTIGO: Primary | ICD-10-CM

## 2019-03-17 LAB
ANION GAP SERPL CALCULATED.3IONS-SCNC: 7 MMOL/L (ref 4–13)
BASOPHILS # BLD AUTO: 0.1 THOUSANDS/ΜL (ref 0–0.1)
BASOPHILS NFR BLD AUTO: 1 % (ref 0–1)
BILIRUB UR QL STRIP: NEGATIVE
BUN SERPL-MCNC: 14 MG/DL (ref 7–25)
CALCIUM SERPL-MCNC: 9.5 MG/DL (ref 8.6–10.5)
CHLORIDE SERPL-SCNC: 105 MMOL/L (ref 98–107)
CLARITY UR: CLEAR
CO2 SERPL-SCNC: 26 MMOL/L (ref 21–31)
COLOR UR: YELLOW
CREAT SERPL-MCNC: 0.98 MG/DL (ref 0.7–1.3)
EOSINOPHIL # BLD AUTO: 0.3 THOUSAND/ΜL (ref 0–0.61)
EOSINOPHIL NFR BLD AUTO: 4 % (ref 0–6)
ERYTHROCYTE [DISTWIDTH] IN BLOOD BY AUTOMATED COUNT: 12.9 % (ref 11.6–15.1)
GFR SERPL CREATININE-BSD FRML MDRD: 80 ML/MIN/1.73SQ M
GLUCOSE SERPL-MCNC: 122 MG/DL (ref 65–140)
GLUCOSE UR STRIP-MCNC: NEGATIVE MG/DL
HCT VFR BLD AUTO: 47.2 % (ref 42–52)
HGB BLD-MCNC: 16.3 G/DL (ref 12–17)
HGB UR QL STRIP.AUTO: NEGATIVE
INR PPP: 0.96 (ref 0.9–1.5)
KETONES UR STRIP-MCNC: ABNORMAL MG/DL
LEUKOCYTE ESTERASE UR QL STRIP: NEGATIVE
LYMPHOCYTES # BLD AUTO: 2.3 THOUSANDS/ΜL (ref 0.6–4.47)
LYMPHOCYTES NFR BLD AUTO: 31 % (ref 14–44)
MCH RBC QN AUTO: 32.1 PG (ref 26.8–34.3)
MCHC RBC AUTO-ENTMCNC: 34.5 G/DL (ref 31.4–37.4)
MCV RBC AUTO: 93 FL (ref 82–98)
MONOCYTES # BLD AUTO: 0.8 THOUSAND/ΜL (ref 0.17–1.22)
MONOCYTES NFR BLD AUTO: 10 % (ref 4–12)
NEUTROPHILS # BLD AUTO: 3.9 THOUSANDS/ΜL (ref 1.85–7.62)
NEUTS SEG NFR BLD AUTO: 54 % (ref 43–75)
NITRITE UR QL STRIP: NEGATIVE
NRBC BLD AUTO-RTO: 0 /100 WBCS
PH UR STRIP.AUTO: 7.5 [PH]
PLATELET # BLD AUTO: 190 THOUSANDS/UL (ref 149–390)
PMV BLD AUTO: 8.3 FL (ref 8.9–12.7)
POTASSIUM SERPL-SCNC: 3.7 MMOL/L (ref 3.5–5.5)
PROT UR STRIP-MCNC: NEGATIVE MG/DL
PROTHROMBIN TIME: 11 SECONDS (ref 10.2–13)
RBC # BLD AUTO: 5.07 MILLION/UL (ref 3.88–5.62)
SODIUM SERPL-SCNC: 138 MMOL/L (ref 134–143)
SP GR UR STRIP.AUTO: 1.02 (ref 1–1.03)
UROBILINOGEN UR QL STRIP.AUTO: 0.2 E.U./DL
WBC # BLD AUTO: 7.3 THOUSAND/UL (ref 4.31–10.16)

## 2019-03-17 PROCEDURE — 85610 PROTHROMBIN TIME: CPT | Performed by: FAMILY MEDICINE

## 2019-03-17 PROCEDURE — 85025 COMPLETE CBC W/AUTO DIFF WBC: CPT | Performed by: FAMILY MEDICINE

## 2019-03-17 PROCEDURE — 96361 HYDRATE IV INFUSION ADD-ON: CPT

## 2019-03-17 PROCEDURE — 81003 URINALYSIS AUTO W/O SCOPE: CPT | Performed by: FAMILY MEDICINE

## 2019-03-17 PROCEDURE — 80048 BASIC METABOLIC PNL TOTAL CA: CPT | Performed by: FAMILY MEDICINE

## 2019-03-17 PROCEDURE — 93005 ELECTROCARDIOGRAM TRACING: CPT

## 2019-03-17 PROCEDURE — 96375 TX/PRO/DX INJ NEW DRUG ADDON: CPT

## 2019-03-17 PROCEDURE — 99284 EMERGENCY DEPT VISIT MOD MDM: CPT

## 2019-03-17 PROCEDURE — 36415 COLL VENOUS BLD VENIPUNCTURE: CPT | Performed by: FAMILY MEDICINE

## 2019-03-17 PROCEDURE — 96374 THER/PROPH/DIAG INJ IV PUSH: CPT

## 2019-03-17 PROCEDURE — 70450 CT HEAD/BRAIN W/O DYE: CPT

## 2019-03-17 RX ORDER — MECLIZINE HCL 12.5 MG/1
25 TABLET ORAL ONCE
Status: COMPLETED | OUTPATIENT
Start: 2019-03-17 | End: 2019-03-17

## 2019-03-17 RX ORDER — MECLIZINE HYDROCHLORIDE 25 MG/1
50 TABLET ORAL 3 TIMES DAILY PRN
Qty: 18 TABLET | Refills: 0 | Status: SHIPPED | OUTPATIENT
Start: 2019-03-17 | End: 2019-08-06 | Stop reason: SDUPTHER

## 2019-03-17 RX ORDER — ONDANSETRON 2 MG/ML
4 INJECTION INTRAMUSCULAR; INTRAVENOUS ONCE
Status: COMPLETED | OUTPATIENT
Start: 2019-03-17 | End: 2019-03-17

## 2019-03-17 RX ORDER — DIPHENHYDRAMINE HYDROCHLORIDE 50 MG/ML
25 INJECTION INTRAMUSCULAR; INTRAVENOUS ONCE
Status: COMPLETED | OUTPATIENT
Start: 2019-03-17 | End: 2019-03-17

## 2019-03-17 RX ADMIN — MECLIZINE 25 MG: 12.5 TABLET ORAL at 16:53

## 2019-03-17 RX ADMIN — DIPHENHYDRAMINE HYDROCHLORIDE 25 MG: 50 INJECTION, SOLUTION INTRAMUSCULAR; INTRAVENOUS at 18:34

## 2019-03-17 RX ADMIN — ONDANSETRON 4 MG: 2 INJECTION INTRAMUSCULAR; INTRAVENOUS at 16:53

## 2019-03-17 RX ADMIN — SODIUM CHLORIDE 1000 ML: 0.9 INJECTION, SOLUTION INTRAVENOUS at 18:20

## 2019-03-17 RX ADMIN — SODIUM CHLORIDE 1000 ML: 0.9 INJECTION, SOLUTION INTRAVENOUS at 16:53

## 2019-03-17 NOTE — ED PROVIDER NOTES
History  Chief Complaint   Patient presents with    Dizziness     onset was yesterday, intermittently when getting up quickly or moving head quickly   Facial Numbness     left side intermittenty for several years       History provided by:  Patient   used: No    Dizziness   Quality:  Head spinning and vertigo  Severity:  Moderate  Onset quality:  Gradual  Duration:  2 days  Timing:  Constant  Progression:  Worsening  Chronicity:  Chronic  Context: bending over, head movement and standing up    Relieved by:  None tried  Ineffective treatments:  None tried  Associated symptoms: no headaches, no hearing loss, no nausea, no palpitations, no syncope, no tinnitus, no vision changes, no vomiting and no weakness    Risk factors: hx of vertigo        This 61-year-old male with history of dizziness and facial numbness for years presented to ED with complain of worsening dizziness since yesterday  Patient states he had 3-4 episode of dizziness since yesterday and 3 episodes today which prompted this ED visit  Patient states he feels like the room is spinning or he denies any nausea vomiting at this time  Denies any blurry vision double vision  He states he dizziness is worse when he is getting up from a sitting position  He states he did not take anything for dizziness  He denies any headache at this time  Prior to Admission Medications   Prescriptions Last Dose Informant Patient Reported? Taking?    CALCIUM/MAGNESIUM/ZINC FORMULA PO   Yes No   Sig: Take 1 tablet by mouth daily   GARLIC PO   Yes No   Sig: Take by mouth daily   Multiple Vitamins-Minerals (MULTIVITAMIN PO)   Yes No   Sig: Take by mouth daily   Omega-3 Fatty Acids (FISH OIL PO)   Yes No   Sig: Take by mouth daily   Probiotic Product (PROBIOTIC-10) CAPS   Yes No   Sig: Take by mouth   aspirin 81 MG tablet   Yes No   Sig: Take 81 mg by mouth   clobetasol (TEMOVATE) 0 05 % ointment   Yes No   Sig: Apply topically as needed   clonazePAM (KlonoPIN) 0 5 mg tablet   No No   Sig: Take 1 tablet (0 5 mg total) by mouth daily at bedtime   docusate sodium (COLACE) 100 mg capsule   No No   Sig: Take 1 capsule (100 mg total) by mouth daily   levothyroxine 75 mcg tablet   No No   Sig: Take 1 tablet (75 mcg total) by mouth daily   omeprazole (PriLOSEC) 20 mg delayed release capsule   Yes No   Sig: Take 20 mg by mouth   simvastatin (ZOCOR) 40 mg tablet   No No   Sig: Take 1 tablet (40 mg total) by mouth daily at bedtime      Facility-Administered Medications: None       Past Medical History:   Diagnosis Date    Benign neoplasm of colon     Chronic rhinitis     Colon polyp     Diverticular disease of colon     Dysphagia     GERD (gastroesophageal reflux disease)     Hyperlipidemia     Internal hemorrhoids without complication     PSA (psoriatic arthritis) (HCC)     elevated    Restless leg syndrome     Thyroid nodule        Past Surgical History:   Procedure Laterality Date    COLONOSCOPY  09/22/2010    COLONOSCOPY  06/13/2007    COLONOSCOPY  05/27/2004    COLONOSCOPY  12/09/2015    GALLBLADDER SURGERY      OTHER SURGICAL HISTORY  11/2012    abcessed tooth    THYROIDECTOMY      TONSILLECTOMY      UPPER GASTROINTESTINAL ENDOSCOPY  10/30/2013       Family History   Problem Relation Age of Onset    Colon cancer Father     Other Father         metastatic: lymph tissue, breast, thyroid, prostate, liver, lung    Other Family         malignant neoplasm of gastrointestinal disease     I have reviewed and agree with the history as documented  Social History     Tobacco Use    Smoking status: Never Smoker    Smokeless tobacco: Never Used   Substance Use Topics    Alcohol use: No    Drug use: No        Review of Systems   HENT: Negative for hearing loss and tinnitus  Cardiovascular: Negative for palpitations and syncope  Gastrointestinal: Negative for nausea and vomiting  Neurological: Positive for dizziness   Negative for weakness and headaches  Physical Exam  Physical Exam   Constitutional: He is oriented to person, place, and time  He appears well-developed and well-nourished  HENT:   Nose: Nose normal    Mouth/Throat: Oropharynx is clear and moist  No oropharyngeal exudate  Eyes: Pupils are equal, round, and reactive to light  Conjunctivae and EOM are normal  No scleral icterus  Neck: Normal range of motion  Neck supple  No JVD present  No tracheal deviation present  Cardiovascular: Normal rate, regular rhythm and normal heart sounds  No murmur heard  Pulmonary/Chest: Effort normal and breath sounds normal  No respiratory distress  He has no wheezes  He has no rales  Abdominal: Soft  Bowel sounds are normal  There is no tenderness  There is no guarding  Musculoskeletal: Normal range of motion  He exhibits no edema or tenderness  Neurological: He is alert and oriented to person, place, and time  No cranial nerve deficit or sensory deficit  He exhibits normal muscle tone  5/5 motor, nl sens   Skin: Skin is warm and dry  Psychiatric: He has a normal mood and affect  His behavior is normal    Nursing note and vitals reviewed    NIHSS:0    Vital Signs  ED Triage Vitals [03/17/19 1643]   Temperature Pulse Respirations Blood Pressure SpO2   98 5 °F (36 9 °C) 91 16 162/84 98 %      Temp Source Heart Rate Source Patient Position - Orthostatic VS BP Location FiO2 (%)   Tympanic Monitor Sitting Left arm --      Pain Score       No Pain           Vitals:    03/17/19 1643 03/17/19 1805   BP: 162/84 150/88   Pulse: 91 86   Patient Position - Orthostatic VS: Sitting Sitting       qSOFA     Row Name 03/17/19 1805 03/17/19 1648 03/17/19 1643          Altered mental status GCS < 15  --  0  --      Respiratory Rate > / =22  0  --  0      Systolic BP < / =638  0  --  0      Q Sofa Score  0  0  0            Visual Acuity  Visual Acuity      Most Recent Value   L Pupil Size (mm)  3   R Pupil Size (mm)  3          ED Medications  Medications   sodium chloride 0 9 % bolus 1,000 mL (1,000 mL Intravenous New Bag 3/17/19 1820)   sodium chloride 0 9 % bolus 1,000 mL (0 mL Intravenous Stopped 3/17/19 1805)   ondansetron (ZOFRAN) injection 4 mg (4 mg Intravenous Given 3/17/19 1653)   meclizine (ANTIVERT) tablet 25 mg (25 mg Oral Given 3/17/19 1653)   diphenhydrAMINE (BENADRYL) injection 25 mg (25 mg Intravenous Given 3/17/19 1834)       Diagnostic Studies  Results Reviewed     Procedure Component Value Units Date/Time    UA w Reflex to Microscopic [[id number]]  (Abnormal) Collected:  03/17/19 1714    Lab Status:  Final result Specimen:  Urine, Clean Catch Updated:  03/17/19 1723     Color, UA Yellow     Clarity, UA Clear     Specific [place], UA 1 020     pH, UA 7 5     Leukocytes, UA Negative     Nitrite, UA Negative     Protein, UA Negative mg/dl      Glucose, UA Negative mg/dl      Ketones, UA Trace mg/dl      Urobilinogen, UA 0 2 E U /dl      Bilirubin, UA Negative     Blood, UA Negative    Basic metabolic panel [[id number]] Collected:  03/17/19 1650    Lab Status:  Final result Specimen:  Blood from Arm, Right Updated:  03/17/19 1719     Sodium 138 mmol/L      Potassium 3 7 mmol/L      Chloride 105 mmol/L      CO2 26 mmol/L      ANION GAP 7 mmol/L      BUN 14 mg/dL      Creatinine 0 98 mg/dL      Glucose 122 mg/dL      Calcium 9 5 mg/dL      eGFR 80 ml/min/1 73sq m     Narrative:       National Kidney Disease Education Program recommendations are as follows:  GFR calculation is accurate only with a steady state creatinine  Chronic Kidney disease less than 60 ml/min/1 73 sq  meters  Kidney failure less than 15 ml/min/1 73 sq  meters      Protime-INR [[id number]]  (Normal) Collected:  03/17/19 1650    Lab Status:  Final result Specimen:  Blood from Arm, Right Updated:  03/17/19 1709     Protime 11 0 seconds      INR 0 96    CBC and differential [[id number]]  (Abnormal) Collected:  03/17/19 1650    Lab Status:  Final result Specimen: Blood from Arm, Right Updated:  03/17/19 1659     WBC 7 30 Thousand/uL      RBC 5 07 Million/uL      Hemoglobin 16 3 g/dL      Hematocrit 47 2 %      MCV 93 fL      MCH 32 1 pg      MCHC 34 5 g/dL      RDW 12 9 %      MPV 8 3 fL      Platelets 688 Thousands/uL      nRBC 0 /100 WBCs      Neutrophils Relative 54 %      Lymphocytes Relative 31 %      Monocytes Relative 10 %      Eosinophils Relative 4 %      Basophils Relative 1 %      Neutrophils Absolute 3 90 Thousands/µL      Lymphocytes Absolute 2 30 Thousands/µL      Monocytes Absolute 0 80 Thousand/µL      Eosinophils Absolute 0 30 Thousand/µL      Basophils Absolute 0 10 Thousands/µL                  CT head without contrast   Final Result by Karin Sim MD (03/17 1738)      No acute intracranial abnormality  Workstation performed: [id number]                    Procedures  Procedures       Phone Contacts  ED Phone Contact    ED Course  ED Course as of Mar 17 1858   Shefali Mojica Mar 17, 2019   2102  Patient states that initially was feeling somewhat better after medication but dizziness the is worse after he  moved from his bed  Will try Benadryl  1848 Pt care is being transfer to Dr Chanelle Cintron  MDM    Disposition  Final diagnoses:   Vertigo   Dizziness     Time reflects when diagnosis was documented in both MDM as applicable and the Disposition within this note     Time User Action Codes Description Comment    3/17/2019  6:29 PM Nicole Beverly [R42] Vertigo     3/17/2019  6:29 PM Gutierrez Goetz Add [R42] Dizziness       ED Disposition     None      Follow-up Information    None         Patient's Medications   Discharge Prescriptions    No medications on file     No discharge procedures on file      ED Provider  Electronically Signed by           Ana Mckee MD  03/17/19 Mikal Edgar MD  03/17/19 7291

## 2019-03-17 NOTE — ED NOTES
Patient assisted to bathroom  Patient's gait slow but steady, able to return to bed from bathroom w/o assistance        Masha Becerril RN  03/17/19 1949

## 2019-03-17 NOTE — ED PROVIDER NOTES
History  Chief Complaint   Patient presents with    Dizziness     onset was yesterday, intermittently when getting up quickly or moving head quickly   Facial Numbness     left side intermittenty for several years     HPI    Prior to Admission Medications   Prescriptions Last Dose Informant Patient Reported? Taking?    CALCIUM/MAGNESIUM/ZINC FORMULA PO   Yes No   Sig: Take 1 tablet by mouth daily   GARLIC PO   Yes No   Sig: Take by mouth daily   Multiple Vitamins-Minerals (MULTIVITAMIN PO)   Yes No   Sig: Take by mouth daily   Omega-3 Fatty Acids (FISH OIL PO)   Yes No   Sig: Take by mouth daily   Probiotic Product (PROBIOTIC-10) CAPS   Yes No   Sig: Take by mouth   aspirin 81 MG tablet   Yes No   Sig: Take 81 mg by mouth   clobetasol (TEMOVATE) 0 05 % ointment   Yes No   Sig: Apply topically as needed   clonazePAM (KlonoPIN) 0 5 mg tablet   No No   Sig: Take 1 tablet (0 5 mg total) by mouth daily at bedtime   docusate sodium (COLACE) 100 mg capsule   No No   Sig: Take 1 capsule (100 mg total) by mouth daily   levothyroxine 75 mcg tablet   No No   Sig: Take 1 tablet (75 mcg total) by mouth daily   omeprazole (PriLOSEC) 20 mg delayed release capsule   Yes No   Sig: Take 20 mg by mouth   simvastatin (ZOCOR) 40 mg tablet   No No   Sig: Take 1 tablet (40 mg total) by mouth daily at bedtime      Facility-Administered Medications: None       Past Medical History:   Diagnosis Date    Benign neoplasm of colon     Chronic rhinitis     Colon polyp     Diverticular disease of colon     Dysphagia     GERD (gastroesophageal reflux disease)     Hyperlipidemia     Internal hemorrhoids without complication     PSA (psoriatic arthritis) (HCC)     elevated    Restless leg syndrome     Thyroid nodule        Past Surgical History:   Procedure Laterality Date    COLONOSCOPY  09/22/2010    COLONOSCOPY  06/13/2007    COLONOSCOPY  05/27/2004    COLONOSCOPY  12/09/2015    GALLBLADDER SURGERY      OTHER SURGICAL HISTORY 11/2012    abcessed tooth    THYROIDECTOMY      TONSILLECTOMY      UPPER GASTROINTESTINAL ENDOSCOPY  10/30/2013       Family History   Problem Relation Age of Onset    Colon cancer Father     Other Father         metastatic: lymph tissue, breast, thyroid, prostate, liver, lung    Other Family         malignant neoplasm of gastrointestinal disease     I have reviewed and agree with the history as documented      Social History     Tobacco Use    Smoking status: Never Smoker    Smokeless tobacco: Never Used   Substance Use Topics    Alcohol use: No    Drug use: No        Review of Systems    Physical Exam  Physical Exam    Vital Signs  ED Triage Vitals [03/17/19 1643]   Temperature Pulse Respirations Blood Pressure SpO2   98 5 °F (36 9 °C) 91 16 162/84 98 %      Temp Source Heart Rate Source Patient Position - Orthostatic VS BP Location FiO2 (%)   Tympanic Monitor Sitting Left arm --      Pain Score       No Pain           Vitals:    03/17/19 1643 03/17/19 1805 03/17/19 1916 03/17/19 1949   BP: 162/84 150/88 121/83 146/82   Pulse: 91 86 80 87   Patient Position - Orthostatic VS: Sitting Sitting Lying Lying       qSOFA     Row Name 03/17/19 1949 03/17/19 1916 03/17/19 1805 03/17/19 1648 03/17/19 1643    Altered mental status GCS < 15  --  --  --  0  --    Respiratory Rate > / =22  0  0  0  --  0    Systolic BP < / =658  0  0  0  --  0    Q Sofa Score  0  0  0  0  0          Visual Acuity  Visual Acuity      Most Recent Value   L Pupil Size (mm)  3   R Pupil Size (mm)  3          ED Medications  Medications   sodium chloride 0 9 % bolus 1,000 mL (0 mL Intravenous Stopped 3/17/19 1805)   ondansetron (ZOFRAN) injection 4 mg (4 mg Intravenous Given 3/17/19 1653)   meclizine (ANTIVERT) tablet 25 mg (25 mg Oral Given 3/17/19 1653)   sodium chloride 0 9 % bolus 1,000 mL (0 mL Intravenous Stopped 3/17/19 1940)   diphenhydrAMINE (BENADRYL) injection 25 mg (25 mg Intravenous Given 3/17/19 1834)       Diagnostic Studies  Results Reviewed     Procedure Component Value Units Date/Time    UA w Reflex to Microscopic [[id number]]  (Abnormal) Collected:  03/17/19 1714    Lab Status:  Final result Specimen:  Urine, Clean Catch Updated:  03/17/19 1723     Color, UA Yellow     Clarity, UA Clear     Specific [place], UA 1 020     pH, UA 7 5     Leukocytes, UA Negative     Nitrite, UA Negative     Protein, UA Negative mg/dl      Glucose, UA Negative mg/dl      Ketones, UA Trace mg/dl      Urobilinogen, UA 0 2 E U /dl      Bilirubin, UA Negative     Blood, UA Negative    Basic metabolic panel [[id number]] Collected:  03/17/19 1650    Lab Status:  Final result Specimen:  Blood from Arm, Right Updated:  03/17/19 1719     Sodium 138 mmol/L      Potassium 3 7 mmol/L      Chloride 105 mmol/L      CO2 26 mmol/L      ANION GAP 7 mmol/L      BUN 14 mg/dL      Creatinine 0 98 mg/dL      Glucose 122 mg/dL      Calcium 9 5 mg/dL      eGFR 80 ml/min/1 73sq m     Narrative:       National Kidney Disease Education Program recommendations are as follows:  GFR calculation is accurate only with a steady state creatinine  Chronic Kidney disease less than 60 ml/min/1 73 sq  meters  Kidney failure less than 15 ml/min/1 73 sq  meters      Protime-INR [[id number]]  (Normal) Collected:  03/17/19 1650    Lab Status:  Final result Specimen:  Blood from Arm, Right Updated:  03/17/19 1709     Protime 11 0 seconds      INR 0 96    CBC and differential [[id number]]  (Abnormal) Collected:  03/17/19 1650    Lab Status:  Final result Specimen:  Blood from Arm, Right Updated:  03/17/19 1659     WBC 7 30 Thousand/uL      RBC 5 07 Million/uL      Hemoglobin 16 3 g/dL      Hematocrit 47 2 %      MCV 93 fL      MCH 32 1 pg      MCHC 34 5 g/dL      RDW 12 9 %      MPV 8 3 fL      Platelets 610 Thousands/uL      nRBC 0 /100 WBCs      Neutrophils Relative 54 %      Lymphocytes Relative 31 %      Monocytes Relative 10 %      Eosinophils Relative 4 %      Basophils Relative 1 % Neutrophils Absolute 3 90 Thousands/µL      Lymphocytes Absolute 2 30 Thousands/µL      Monocytes Absolute 0 80 Thousand/µL      Eosinophils Absolute 0 30 Thousand/µL      Basophils Absolute 0 10 Thousands/µL                  CT head without contrast   Final Result by Isabela Mcdonnell MD (03/17 1738)      No acute intracranial abnormality  Workstation performed: [id number]                    Procedures  Procedures       Phone Contacts  ED Phone Contact    ED Course                               MDM  Number of Diagnoses or Management Options  Dizziness:   Vertigo:   Diagnosis management comments: THIS PATIENT HAS BEEN SIGNED OUT BY DR Eduardo Kingston, THE OUTGOING  [facility] [address]  REFERENCE IS MADE TO THE MEDICAL RECORD COMPOSED BY DR Eduardo Kingston  ENTRIES TO THE MEDICAL RECORD BY THIS EMERGENCY DEPARTMENT PHYSICIAN ARE FOR THE PURPOSES OF RE-EVALUATION AND DISPOSITION  ON RE-EVALUATION THE PATIENT WAS COMFORTABLE WHILE AT REST  THE DIZZINESS WAS INDUCED BY A HEAD ROTATION  PATIENT'S STUDIES TO INCLUDE CT SCAN RETURNED UNREMARKABLE  PATIENT REMAINS HEMODYNAMICALLY CLINICALLY STABLE WITHOUT EVIDENCE OF AN ACUTE CARDIOPULMONARY EVENT AND IS ACCORDINGLY DISCHARGED HOME  Disposition  Final diagnoses:   Vertigo   Dizziness     Time reflects when diagnosis was documented in both MDM as applicable and the Disposition within this note     Time User Action Codes Description Comment    3/17/2019  6:29 PM Hoa Dawson [R42] Vertigo     3/17/2019  6:29 PM Batsheva Momin [R42] Dizziness       ED Disposition     None      Follow-up Information    None         Patient's Medications   Discharge Prescriptions    No medications on file     No discharge procedures on file      ED Provider  Electronically Signed by           Ankita Butler MD  03/17/19 Jaime Obrien MD  03/17/19 2000

## 2019-03-19 LAB
ATRIAL RATE: 77 BPM
P AXIS: 44 DEGREES
PR INTERVAL: 180 MS
QRS AXIS: -36 DEGREES
QRSD INTERVAL: 94 MS
QT INTERVAL: 378 MS
QTC INTERVAL: 427 MS
T WAVE AXIS: 17 DEGREES
VENTRICULAR RATE: 77 BPM

## 2019-03-19 PROCEDURE — 93010 ELECTROCARDIOGRAM REPORT: CPT | Performed by: INTERNAL MEDICINE

## 2019-03-20 ENCOUNTER — OFFICE VISIT (OUTPATIENT)
Dept: FAMILY MEDICINE CLINIC | Facility: CLINIC | Age: 69
End: 2019-03-20
Payer: MEDICARE

## 2019-03-20 VITALS
SYSTOLIC BLOOD PRESSURE: 130 MMHG | HEIGHT: 62 IN | HEART RATE: 87 BPM | RESPIRATION RATE: 16 BRPM | OXYGEN SATURATION: 98 % | BODY MASS INDEX: 31.36 KG/M2 | TEMPERATURE: 98.1 F | WEIGHT: 170.4 LBS | DIASTOLIC BLOOD PRESSURE: 76 MMHG

## 2019-03-20 DIAGNOSIS — R51.9 NONINTRACTABLE HEADACHE, UNSPECIFIED CHRONICITY PATTERN, UNSPECIFIED HEADACHE TYPE: Primary | ICD-10-CM

## 2019-03-20 DIAGNOSIS — K72.90 HE (HEPATIC ENCEPHALOPATHY) (HCC): ICD-10-CM

## 2019-03-20 DIAGNOSIS — N40.0 BENIGN PROSTATIC HYPERPLASIA WITHOUT LOWER URINARY TRACT SYMPTOMS: ICD-10-CM

## 2019-03-20 DIAGNOSIS — E78.00 HYPERCHOLESTEROLEMIA: ICD-10-CM

## 2019-03-20 DIAGNOSIS — K21.00 GASTROESOPHAGEAL REFLUX DISEASE WITH ESOPHAGITIS: ICD-10-CM

## 2019-03-20 PROBLEM — K76.82 HE (HEPATIC ENCEPHALOPATHY): Status: ACTIVE | Noted: 2019-03-20

## 2019-03-20 PROCEDURE — 99214 OFFICE O/P EST MOD 30 MIN: CPT | Performed by: INTERNAL MEDICINE

## 2019-03-20 NOTE — PROGRESS NOTES
Vitals:    03/20/19 0815   BP: 130/76   Pulse: 87   Resp: 16   Temp: 98 1 °F (36 7 °C)   TempSrc: Tympanic   SpO2: 98%   Weight: 77 3 kg (170 lb 6 4 oz)   Height: 5' 2" (1 575 m)       Assessment/Plan:  Dizziness with vertigo  Will get MRI of the head most likely this appears to be positional and post still but cannot exclude cerebrovascular disease  Patient states he had a carotid Doppler done and will send a copy of this to me  Hypothyroidism patient is on replacement therapy with levothyroxine 75 mcg daily  Gastroesophageal reflux disease patient is on proton pump inhibitor  Prilosec 20 mg daily  Hypercholesterolemia patient is on Zocor 40 mg daily  His lab data in emergency room BMP was normal urine exam was normal CBC was normal  Diagnoses and all orders for this visit:    Nonintractable headache, unspecified chronicity pattern, unspecified headache type  -     MRI brain w wo contrast; Future    HE (hepatic encephalopathy) (HCC)        Subjective:      Patient ID: Milka Crockett is a 79 y o  male      HPI 53-year-old white male is coming in for a follow-up visit patient was recently seen in the emergency room and [facility] on 03/17/2019 with that dizziness which starts with intermittently getting up too quickly moving head too quickly he has no speech deficit no visual deficit no motor or sensory deficits is facial numbness lasting several GS on the left side which is nonspecific he has history of gastroesophageal reflux disease hypothyroidism hypercholesterolemia 1 day after the visit to the emergency room patient became dizzy yesterday and had vertigo which lasted for 10 minutes he almost fell to the ground but held on to the door to stabilize himself no palpitation was reported he also complains of headaches which are diffuse and time severe no nausea vomiting reported he had blood test done in the emergency room which were unremarkable his CT scan of the head was unremarkable he was given a Antivert p r n      The following portions of the patient's history were reviewed and updated as appropriate: allergies, current medications, past family history, past medical history, past social history, past surgical history and problem list       Current Outpatient Medications:     aspirin 81 MG tablet, Take 81 mg by mouth, Disp: , Rfl:     CALCIUM/MAGNESIUM/ZINC FORMULA PO, Take 1 tablet by mouth daily, Disp: , Rfl:     clobetasol (TEMOVATE) 0 05 % ointment, Apply topically as needed, Disp: , Rfl:     clonazePAM (KlonoPIN) 0 5 mg tablet, Take 1 tablet (0 5 mg total) by mouth daily at bedtime, Disp: 30 tablet, Rfl: 0    docusate sodium (COLACE) 100 mg capsule, Take 1 capsule (100 mg total) by mouth daily, Disp: 30 capsule, Rfl: 3    GARLIC PO, Take by mouth daily, Disp: , Rfl:     levothyroxine 75 mcg tablet, Take 1 tablet (75 mcg total) by mouth daily, Disp: 30 tablet, Rfl: 2    meclizine (ANTIVERT) 25 mg tablet, Take 2 tablets (50 mg total) by mouth 3 (three) times a day as needed for dizziness for up to 3 days, Disp: 18 tablet, Rfl: 0    Multiple Vitamins-Minerals (MULTIVITAMIN PO), Take by mouth daily, Disp: , Rfl:     Omega-3 Fatty Acids (FISH OIL PO), Take by mouth daily, Disp: , Rfl:     omeprazole (PriLOSEC) 20 mg delayed release capsule, Take 20 mg by mouth, Disp: , Rfl:     Probiotic Product (PROBIOTIC-10) CAPS, Take by mouth, Disp: , Rfl:     simvastatin (ZOCOR) 40 mg tablet, Take 1 tablet (40 mg total) by mouth daily at bedtime, Disp: 30 tablet, Rfl: 2    Past Medical History:   Diagnosis Date    Benign neoplasm of colon     Chronic rhinitis     Colon polyp     Diverticular disease of colon     Dysphagia     GERD (gastroesophageal reflux disease)     Hyperlipidemia     Internal hemorrhoids without complication     PSA (psoriatic arthritis) (HCC)     elevated    Restless leg syndrome     Thyroid nodule        Past Surgical History:   Procedure Laterality Date    COLONOSCOPY 09/22/2010    COLONOSCOPY  06/13/2007    COLONOSCOPY  05/27/2004    COLONOSCOPY  12/09/2015    GALLBLADDER SURGERY      OTHER SURGICAL HISTORY  11/2012    abcessed tooth    THYROIDECTOMY      TONSILLECTOMY      UPPER GASTROINTESTINAL ENDOSCOPY  10/30/2013       Social History       Patient Active Problem List   Diagnosis    GERD (gastroesophageal reflux disease)    Hypercholesterolemia    BPH (benign prostatic hyperplasia)    HE (hepatic encephalopathy) (HCC)           Review of Systems   Constitutional: Negative  HENT: Negative  Eyes: Negative  Respiratory: Negative  Cardiovascular: Negative  Gastrointestinal: Negative  Endocrine: Negative  Genitourinary: Negative  Musculoskeletal: Negative  Skin: Negative  Allergic/Immunologic: Negative  Neurological: Negative  Hematological: Negative  Psychiatric/Behavioral: Negative  Objective:      /76   Pulse 87   Temp 98 1 °F (36 7 °C) (Tympanic)   Resp 16   Ht 5' 2" (1 575 m)   Wt 77 3 kg (170 lb 6 4 oz)   SpO2 98%   BMI 31 17 kg/m²          Physical Exam   Constitutional: He is oriented to person, place, and time  He appears well-developed and well-nourished  HENT:   Head: Normocephalic  Mouth/Throat: Oropharynx is clear and moist    Eyes: Pupils are equal, round, and reactive to light  Conjunctivae are normal    Neck: Normal range of motion  Neck supple  Cardiovascular: Normal rate and normal heart sounds  Pulmonary/Chest: Effort normal and breath sounds normal    Abdominal: Soft  Bowel sounds are normal    Musculoskeletal: Normal range of motion  Neurological: He is alert and oriented to person, place, and time  Skin: Skin is warm and dry             Admission on 03/17/2019, Discharged on 03/17/2019   Component Date Value Ref Range Status    WBC 03/17/2019 7 30  4 31 - 10 16 Thousand/uL Final    RBC 03/17/2019 5 07  3 88 - 5 62 Million/uL Final    Hemoglobin 03/17/2019 16 3  12 0 - 17 0 g/dL Final    Hematocrit 03/17/2019 47 2  42 0 - 52 0 % Final    MCV 03/17/2019 93  82 - 98 fL Final    MCH 03/17/2019 32 1  26 8 - 34 3 pg Final    MCHC 03/17/2019 34 5  31 4 - 37 4 g/dL Final    RDW 03/17/2019 12 9  11 6 - 15 1 % Final    MPV 03/17/2019 8 3* 8 9 - 12 7 fL Final    Platelets 75/02/1454 190  149 - 390 Thousands/uL Final    nRBC 03/17/2019 0  /100 WBCs Final    Neutrophils Relative 03/17/2019 54  43 - 75 % Final    Lymphocytes Relative 03/17/2019 31  14 - 44 % Final    Monocytes Relative 03/17/2019 10  4 - 12 % Final    Eosinophils Relative 03/17/2019 4  0 - 6 % Final    Basophils Relative 03/17/2019 1  0 - 1 % Final    Neutrophils Absolute 03/17/2019 3 90  1 85 - 7 62 Thousands/µL Final    Lymphocytes Absolute 03/17/2019 2 30  0 60 - 4 47 Thousands/µL Final    Monocytes Absolute 03/17/2019 0 80  0 17 - 1 22 Thousand/µL Final    Eosinophils Absolute 03/17/2019 0 30  0 00 - 0 61 Thousand/µL Final    Basophils Absolute 03/17/2019 0 10  0 00 - 0 10 Thousands/µL Final    Color, UA 03/17/2019 Yellow  Yellow, Straw Final    Clarity, UA 03/17/2019 Clear  Hazy, Clear Final    Specific Gravity, UA 03/17/2019 1 020  1 005 - 1 030 Final    pH, UA 03/17/2019 7 5  5 0, 5 5, 6 0, 6 5, 7 0, 7 5 Final    Leukocytes, UA 03/17/2019 Negative  Negative Final    Nitrite, UA 03/17/2019 Negative  Negative Final    Protein, UA 03/17/2019 Negative  Negative, Interference- unable to analyze mg/dl Final    Glucose, UA 03/17/2019 Negative  Negative mg/dl Final    Ketones, UA 03/17/2019 Trace* Negative mg/dl Final    Urobilinogen, UA 03/17/2019 0 2  0 2, 1 0 E U /dl E U /dl Final    Bilirubin, UA 03/17/2019 Negative  Negative Final    Blood, UA 03/17/2019 Negative  Negative Final    Sodium 03/17/2019 138  134 - 143 mmol/L Final    Potassium 03/17/2019 3 7  3 5 - 5 5 mmol/L Final    Chloride 03/17/2019 105  98 - 107 mmol/L Final    CO2 03/17/2019 26  21 - 31 mmol/L Final    ANION GAP 03/17/2019 7  4 - 13 mmol/L Final    BUN 03/17/2019 14  7 - 25 mg/dL Final    Creatinine 03/17/2019 0 98  0 70 - 1 30 mg/dL Final    Standardized to IDMS reference method    Glucose 03/17/2019 122  65 - 140 mg/dL Final      If the patient is fasting, the ADA then defines impaired fasting glucose as > 100 mg/dL and diabetes as > or equal to 123 mg/dL  Specimen collection should occur prior to Sulfasalazine administration due to the potential for falsely depressed results  Specimen collection should occur prior to Sulfapyridine administration due to the potential for falsely elevated results   Calcium 03/17/2019 9 5  8 6 - 10 5 mg/dL Final    eGFR 03/17/2019 80  ml/min/1 73sq m Final    Protime 03/17/2019 11 0  10 2 - 13 0 seconds Final         INR 03/17/2019 0 96  0 90 - 1 50 Final         Ventricular Rate 03/17/2019 77  BPM Final    Atrial Rate 03/17/2019 77  BPM Final    ME Interval 03/17/2019 180  ms Final    QRSD Interval 03/17/2019 94  ms Final    QT Interval 03/17/2019 378  ms Final    QTC Interval 03/17/2019 427  ms Final    P Axis 03/17/2019 44  degrees Final    QRS Axis 03/17/2019 -36  degrees Final    T Wave Axis 03/17/2019 17  degrees Final   Appointment on 01/02/2019   Component Date Value Ref Range Status    Hemoglobin A1C 01/02/2019 6 2  4 2 - 6 3 % Final    EAG 01/02/2019 131  mg/dl Final   Appointment on 12/31/2018   Component Date Value Ref Range Status    Hepatitis C Ab 12/31/2018 Non-reactive  Non-reactive Final    LYME AB IGG 12/31/2018 0 09  0 00 - 0 79 Final    NEGATIVE(0 00-0 79)-Absence of detectable Borrelia IgG Antibodies  A negative result does not exclude the possibility of Borrelia infection  If early Lyme disease is suspected,a second sample should be collected & tested 4 weeks after initial testing   LYME AB IGM 12/31/2018 0 64  0 00 - 0 79 Final    NEGATIVE (0 00-0 79)-Absence of detectable Borrelia IgM antibodies   A negative result does not exclude the possibility of Borrelia infection  If early lyme disease is suspected, a second sample should be collected & tested 4 weeks after initial testing   WBC 12/31/2018 7 30  4 31 - 10 16 Thousand/uL Final    RBC 12/31/2018 5 09  3 88 - 5 62 Million/uL Final    Hemoglobin 12/31/2018 16 1  12 0 - 17 0 g/dL Final    Hematocrit 12/31/2018 47 9  42 0 - 52 0 % Final    MCV 12/31/2018 94  82 - 98 fL Final    MCH 12/31/2018 31 6  26 8 - 34 3 pg Final    MCHC 12/31/2018 33 6  31 4 - 37 4 g/dL Final    RDW 12/31/2018 12 9  11 6 - 15 1 % Final    MPV 12/31/2018 7 9* 8 9 - 12 7 fL Final    Platelets 60/47/7523 201  149 - 390 Thousands/uL Final    nRBC 12/31/2018 0  /100 WBCs Final    Neutrophils Relative 12/31/2018 64  43 - 75 % Final    Lymphocytes Relative 12/31/2018 25  14 - 44 % Final    Monocytes Relative 12/31/2018 8  4 - 12 % Final    Eosinophils Relative 12/31/2018 2  0 - 6 % Final    Basophils Relative 12/31/2018 1  0 - 1 % Final    Neutrophils Absolute 12/31/2018 4 70  1 85 - 7 62 Thousands/µL Final    Lymphocytes Absolute 12/31/2018 1 80  0 60 - 4 47 Thousands/µL Final    Monocytes Absolute 12/31/2018 0 60  0 17 - 1 22 Thousand/µL Final    Eosinophils Absolute 12/31/2018 0 20  0 00 - 0 61 Thousand/µL Final    Basophils Absolute 12/31/2018 0 00  0 00 - 0 10 Thousands/µL Final    Sodium 12/31/2018 140  134 - 143 mmol/L Final    Potassium 12/31/2018 4 2  3 5 - 5 5 mmol/L Final    Chloride 12/31/2018 106  98 - 107 mmol/L Final    CO2 12/31/2018 26  21 - 31 mmol/L Final    ANION GAP 12/31/2018 8  4 - 13 mmol/L Final    BUN 12/31/2018 14  7 - 25 mg/dL Final    Creatinine 12/31/2018 0 95  0 70 - 1 30 mg/dL Final    Standardized to IDMS reference method    Glucose, Fasting 12/31/2018 113* 65 - 99 mg/dL Final      Specimen collection should occur prior to Sulfasalazine administration due to the potential for falsely depressed results   Specimen collection should occur prior to Sulfapyridine administration due to the potential for falsely elevated results   Calcium 12/31/2018 9 1  8 6 - 10 5 mg/dL Final    AST 12/31/2018 25  13 - 39 U/L Final      Specimen collection should occur prior to Sulfasalazine administration due to the potential for falsely depressed results   ALT 12/31/2018 33  7 - 52 U/L Final      Specimen collection should occur prior to Sulfasalazine administration due to the potential for falsely depressed results   Alkaline Phosphatase 12/31/2018 48* 55 - 165 U/L Final    Total Protein 12/31/2018 6 4  6 4 - 8 9 g/dL Final    Albumin 12/31/2018 4 1  3 5 - 5 7 g/dL Final    Total Bilirubin 12/31/2018 0 50  0 20 - 1 00 mg/dL Final    eGFR 12/31/2018 83  ml/min/1 73sq m Final       Procedure: Us Abdominal Aorta Screening Aaa    Result Date: 2/6/2019  Narrative: ABDOMINAL AORTIC ULTRASOUND INDICATION:   Z29 9: Encounter for prophylactic measures, unspecified  COMPARISON: None FINDINGS: Ultrasound of the abdominal aorta was performed in longitudinal and transverse planes with a curvilinear transducer  Proximal aorta:  2 2 x 2 3 cm Mid aorta:    2 x 2 cm Distal aorta:    1 6 x 1 7 cm Right common iliac origin:  1 2 x 1 1 cm Left common iliac origin:  1 2 x 1 2 cm No periaortic collections or adenopathy detected  Impression: No evidence for abdominal aortic aneurysm  Workstation performed: [id number]       Social History     Social History Narrative    Consumes on average 3 cups of regular coffee per day         Family History   Problem Relation Age of Onset    Colon cancer Father     Other Father         metastatic: lymph tissue, breast, thyroid, prostate, liver, lung    Other Family         malignant neoplasm of gastrointestinal disease       Past Surgical History:   Procedure Laterality Date    COLONOSCOPY  09/22/2010    COLONOSCOPY  06/13/2007    COLONOSCOPY  05/27/2004    COLONOSCOPY  12/09/2015    GALLBLADDER SURGERY      OTHER SURGICAL HISTORY  11/2012    abcessed tooth    THYROIDECTOMY  TONSILLECTOMY      UPPER GASTROINTESTINAL ENDOSCOPY  10/30/2013       Allergies   Allergen Reactions    Codeine

## 2019-03-27 ENCOUNTER — HOSPITAL ENCOUNTER (OUTPATIENT)
Dept: MRI IMAGING | Facility: HOSPITAL | Age: 69
Discharge: HOME | End: 2019-03-27
Attending: INTERNAL MEDICINE
Payer: MEDICARE

## 2019-03-27 DIAGNOSIS — R51.9 NONINTRACTABLE HEADACHE, UNSPECIFIED CHRONICITY PATTERN, UNSPECIFIED HEADACHE TYPE: ICD-10-CM

## 2019-03-27 PROCEDURE — A9585 GADOBUTROL INJECTION: HCPCS | Performed by: INTERNAL MEDICINE

## 2019-03-27 PROCEDURE — 70553 MRI BRAIN STEM W/O & W/DYE: CPT

## 2019-03-27 RX ADMIN — GADOBUTROL 7 ML: 604.72 INJECTION INTRAVENOUS at 14:50

## 2019-04-09 DIAGNOSIS — F41.9 ANXIETY: ICD-10-CM

## 2019-04-09 DIAGNOSIS — E03.9 HYPOTHYROIDISM, UNSPECIFIED TYPE: ICD-10-CM

## 2019-04-09 RX ORDER — LEVOTHYROXINE SODIUM 0.07 MG/1
75 TABLET ORAL DAILY
Qty: 30 TABLET | Refills: 2 | Status: SHIPPED | OUTPATIENT
Start: 2019-04-09 | End: 2019-07-08 | Stop reason: SDUPTHER

## 2019-04-09 RX ORDER — CLONAZEPAM 0.5 MG/1
0.5 TABLET ORAL
Qty: 30 TABLET | Refills: 0 | Status: SHIPPED | OUTPATIENT
Start: 2019-04-09 | End: 2019-05-09 | Stop reason: SDUPTHER

## 2019-04-10 ENCOUNTER — TELEPHONE (OUTPATIENT)
Dept: FAMILY MEDICINE CLINIC | Facility: CLINIC | Age: 69
End: 2019-04-10

## 2019-05-09 DIAGNOSIS — F41.9 ANXIETY: ICD-10-CM

## 2019-05-09 DIAGNOSIS — E78.5 HYPERLIPIDEMIA, UNSPECIFIED HYPERLIPIDEMIA TYPE: ICD-10-CM

## 2019-05-09 RX ORDER — CLONAZEPAM 0.5 MG/1
0.5 TABLET ORAL
Qty: 30 TABLET | Refills: 0 | Status: SHIPPED | OUTPATIENT
Start: 2019-05-09 | End: 2019-06-10 | Stop reason: SDUPTHER

## 2019-05-09 RX ORDER — SIMVASTATIN 40 MG
40 TABLET ORAL
Qty: 30 TABLET | Refills: 2 | Status: SHIPPED | OUTPATIENT
Start: 2019-05-09 | End: 2019-08-06 | Stop reason: SDUPTHER

## 2019-05-14 ENCOUNTER — TRANSCRIBE ORDERS (OUTPATIENT)
Dept: ADMINISTRATIVE | Facility: HOSPITAL | Age: 69
End: 2019-05-14

## 2019-05-14 ENCOUNTER — APPOINTMENT (OUTPATIENT)
Dept: LAB | Facility: HOSPITAL | Age: 69
End: 2019-05-14
Attending: UROLOGY
Payer: MEDICARE

## 2019-05-14 DIAGNOSIS — E78.00 HYPERCHOLESTEREMIA: ICD-10-CM

## 2019-05-14 DIAGNOSIS — E03.9 HYPOTHYROIDISM, UNSPECIFIED TYPE: ICD-10-CM

## 2019-05-14 DIAGNOSIS — R97.20 ELEVATED PROSTATE SPECIFIC ANTIGEN (PSA): Primary | ICD-10-CM

## 2019-05-14 DIAGNOSIS — R97.20 ELEVATED PROSTATE SPECIFIC ANTIGEN (PSA): ICD-10-CM

## 2019-05-14 DIAGNOSIS — E78.00 HYPERCHOLESTEREMIA: Primary | ICD-10-CM

## 2019-05-14 LAB
ALBUMIN SERPL BCP-MCNC: 4.3 G/DL (ref 3.5–5.7)
ALP SERPL-CCNC: 45 U/L (ref 55–165)
ALT SERPL W P-5'-P-CCNC: 29 U/L (ref 7–52)
ANION GAP SERPL CALCULATED.3IONS-SCNC: 7 MMOL/L (ref 4–13)
AST SERPL W P-5'-P-CCNC: 22 U/L (ref 13–39)
BILIRUB DIRECT SERPL-MCNC: 0.1 MG/DL (ref 0–0.2)
BILIRUB SERPL-MCNC: 0.6 MG/DL (ref 0.2–1)
BUN SERPL-MCNC: 13 MG/DL (ref 7–25)
CALCIUM SERPL-MCNC: 9.3 MG/DL (ref 8.6–10.5)
CHLORIDE SERPL-SCNC: 106 MMOL/L (ref 98–107)
CHOLEST SERPL-MCNC: 134 MG/DL (ref 0–200)
CO2 SERPL-SCNC: 26 MMOL/L (ref 21–31)
CREAT SERPL-MCNC: 0.97 MG/DL (ref 0.7–1.3)
GFR SERPL CREATININE-BSD FRML MDRD: 81 ML/MIN/1.73SQ M
GLUCOSE P FAST SERPL-MCNC: 111 MG/DL (ref 65–99)
HDLC SERPL-MCNC: 37 MG/DL (ref 40–60)
LDLC SERPL CALC-MCNC: 77 MG/DL (ref 0–100)
NONHDLC SERPL-MCNC: 97 MG/DL
POTASSIUM SERPL-SCNC: 4.1 MMOL/L (ref 3.5–5.5)
PROT SERPL-MCNC: 6.7 G/DL (ref 6.4–8.9)
SODIUM SERPL-SCNC: 139 MMOL/L (ref 134–143)
T4 SERPL-MCNC: 8.1 UG/DL (ref 4.7–13.3)
TRIGL SERPL-MCNC: 99 MG/DL (ref 44–166)
TSH SERPL DL<=0.05 MIU/L-ACNC: 2.27 UIU/ML (ref 0.45–5.33)

## 2019-05-14 PROCEDURE — 84154 ASSAY OF PSA FREE: CPT

## 2019-05-14 PROCEDURE — 80048 BASIC METABOLIC PNL TOTAL CA: CPT

## 2019-05-14 PROCEDURE — 80061 LIPID PANEL: CPT

## 2019-05-14 PROCEDURE — 80076 HEPATIC FUNCTION PANEL: CPT

## 2019-05-14 PROCEDURE — 36415 COLL VENOUS BLD VENIPUNCTURE: CPT

## 2019-05-14 PROCEDURE — 84443 ASSAY THYROID STIM HORMONE: CPT

## 2019-05-14 PROCEDURE — 84436 ASSAY OF TOTAL THYROXINE: CPT

## 2019-05-14 PROCEDURE — 84153 ASSAY OF PSA TOTAL: CPT

## 2019-05-15 LAB
PSA FREE MFR SERPL: 35.4 %
PSA FREE SERPL-MCNC: 2.55 NG/ML
PSA SERPL-MCNC: 7.2 NG/ML (ref 0–4)

## 2019-06-10 ENCOUNTER — TRANSCRIBE ORDERS (OUTPATIENT)
Dept: ADMINISTRATIVE | Facility: HOSPITAL | Age: 69
End: 2019-06-10

## 2019-06-10 ENCOUNTER — APPOINTMENT (OUTPATIENT)
Dept: LAB | Facility: HOSPITAL | Age: 69
End: 2019-06-10
Attending: UROLOGY
Payer: MEDICARE

## 2019-06-10 DIAGNOSIS — R31.29 MICROSCOPIC HEMATURIA: Primary | ICD-10-CM

## 2019-06-10 DIAGNOSIS — F41.9 ANXIETY: ICD-10-CM

## 2019-06-10 LAB
BACTERIA UR QL AUTO: ABNORMAL /HPF
BILIRUB UR QL STRIP: NEGATIVE
CLARITY UR: CLEAR
COLOR UR: YELLOW
GLUCOSE UR STRIP-MCNC: NEGATIVE MG/DL
HGB UR QL STRIP.AUTO: ABNORMAL
KETONES UR STRIP-MCNC: NEGATIVE MG/DL
LEUKOCYTE ESTERASE UR QL STRIP: NEGATIVE
NITRITE UR QL STRIP: NEGATIVE
NON-SQ EPI CELLS URNS QL MICRO: ABNORMAL /HPF
PH UR STRIP.AUTO: 6 [PH]
PROT UR STRIP-MCNC: NEGATIVE MG/DL
RBC #/AREA URNS AUTO: ABNORMAL /HPF
SP GR UR STRIP.AUTO: 1.01 (ref 1–1.03)
UROBILINOGEN UR QL STRIP.AUTO: 0.2 E.U./DL
WBC #/AREA URNS AUTO: ABNORMAL /HPF

## 2019-06-10 PROCEDURE — 81001 URINALYSIS AUTO W/SCOPE: CPT

## 2019-06-10 PROCEDURE — 81003 URINALYSIS AUTO W/O SCOPE: CPT

## 2019-06-10 RX ORDER — CLONAZEPAM 0.5 MG/1
0.5 TABLET ORAL
Qty: 30 TABLET | Refills: 0 | Status: SHIPPED | OUTPATIENT
Start: 2019-06-10 | End: 2019-07-08 | Stop reason: SDUPTHER

## 2019-06-19 ENCOUNTER — OFFICE VISIT (OUTPATIENT)
Dept: FAMILY MEDICINE CLINIC | Facility: CLINIC | Age: 69
End: 2019-06-19
Payer: MEDICARE

## 2019-06-19 VITALS
DIASTOLIC BLOOD PRESSURE: 74 MMHG | RESPIRATION RATE: 18 BRPM | OXYGEN SATURATION: 98 % | TEMPERATURE: 97.7 F | SYSTOLIC BLOOD PRESSURE: 118 MMHG | HEART RATE: 89 BPM | BODY MASS INDEX: 27.56 KG/M2 | HEIGHT: 65 IN | WEIGHT: 165.4 LBS

## 2019-06-19 DIAGNOSIS — E78.00 HYPERCHOLESTEROLEMIA: ICD-10-CM

## 2019-06-19 DIAGNOSIS — K21.00 GASTROESOPHAGEAL REFLUX DISEASE WITH ESOPHAGITIS: Primary | ICD-10-CM

## 2019-06-19 DIAGNOSIS — N40.0 BENIGN PROSTATIC HYPERPLASIA WITHOUT LOWER URINARY TRACT SYMPTOMS: ICD-10-CM

## 2019-06-19 DIAGNOSIS — R73.9 ELEVATED BLOOD SUGAR: ICD-10-CM

## 2019-06-19 DIAGNOSIS — R97.20 ELEVATED PSA: ICD-10-CM

## 2019-06-19 PROBLEM — K72.90 HE (HEPATIC ENCEPHALOPATHY) (HCC): Status: RESOLVED | Noted: 2019-03-20 | Resolved: 2019-06-19

## 2019-06-19 PROBLEM — K76.82 HE (HEPATIC ENCEPHALOPATHY): Status: RESOLVED | Noted: 2019-03-20 | Resolved: 2019-06-19

## 2019-06-19 PROBLEM — K63.5 HYPERPLASTIC COLONIC POLYP: Status: ACTIVE | Noted: 2019-06-19

## 2019-06-19 PROCEDURE — 99214 OFFICE O/P EST MOD 30 MIN: CPT | Performed by: INTERNAL MEDICINE

## 2019-06-19 RX ORDER — TAMSULOSIN HYDROCHLORIDE 0.4 MG/1
CAPSULE ORAL
COMMUNITY
Start: 2019-06-10 | End: 2020-05-13 | Stop reason: SDUPTHER

## 2019-07-08 DIAGNOSIS — E03.9 HYPOTHYROIDISM, UNSPECIFIED TYPE: ICD-10-CM

## 2019-07-08 DIAGNOSIS — F41.9 ANXIETY: ICD-10-CM

## 2019-07-08 RX ORDER — CLONAZEPAM 0.5 MG/1
0.5 TABLET ORAL
Qty: 30 TABLET | Refills: 0 | Status: SHIPPED | OUTPATIENT
Start: 2019-07-08 | End: 2019-08-06 | Stop reason: SDUPTHER

## 2019-07-08 RX ORDER — LEVOTHYROXINE SODIUM 0.07 MG/1
75 TABLET ORAL DAILY
Qty: 30 TABLET | Refills: 2 | Status: SHIPPED | OUTPATIENT
Start: 2019-07-08 | End: 2019-08-06 | Stop reason: SDUPTHER

## 2019-08-06 DIAGNOSIS — R42 DIZZINESS: ICD-10-CM

## 2019-08-06 DIAGNOSIS — F41.9 ANXIETY: ICD-10-CM

## 2019-08-06 DIAGNOSIS — E03.9 HYPOTHYROIDISM, UNSPECIFIED TYPE: ICD-10-CM

## 2019-08-06 DIAGNOSIS — E78.5 HYPERLIPIDEMIA, UNSPECIFIED HYPERLIPIDEMIA TYPE: ICD-10-CM

## 2019-08-06 RX ORDER — LEVOTHYROXINE SODIUM 0.07 MG/1
75 TABLET ORAL DAILY
Qty: 30 TABLET | Refills: 2 | Status: SHIPPED | OUTPATIENT
Start: 2019-08-06 | End: 2019-09-25 | Stop reason: SDUPTHER

## 2019-08-06 RX ORDER — DOCUSATE SODIUM 100 MG/1
100 CAPSULE, LIQUID FILLED ORAL DAILY
Qty: 30 CAPSULE | Refills: 2 | Status: SHIPPED | OUTPATIENT
Start: 2019-08-06 | End: 2019-09-25 | Stop reason: SDUPTHER

## 2019-08-06 RX ORDER — SIMVASTATIN 40 MG
40 TABLET ORAL
Qty: 30 TABLET | Refills: 2 | Status: CANCELLED | OUTPATIENT
Start: 2019-08-06

## 2019-08-06 RX ORDER — CLONAZEPAM 0.5 MG/1
0.5 TABLET ORAL
Qty: 30 TABLET | Refills: 0 | Status: SHIPPED | OUTPATIENT
Start: 2019-08-06 | End: 2019-09-05 | Stop reason: SDUPTHER

## 2019-08-06 RX ORDER — MECLIZINE HYDROCHLORIDE 25 MG/1
25 TABLET ORAL 3 TIMES DAILY PRN
Qty: 18 TABLET | Refills: 0 | Status: CANCELLED | OUTPATIENT
Start: 2019-08-06 | End: 2019-08-09

## 2019-08-06 RX ORDER — CLONAZEPAM 0.5 MG/1
0.5 TABLET ORAL
Qty: 30 TABLET | Refills: 0 | Status: CANCELLED | OUTPATIENT
Start: 2019-08-06

## 2019-08-06 RX ORDER — MECLIZINE HYDROCHLORIDE 25 MG/1
25 TABLET ORAL 3 TIMES DAILY PRN
Qty: 18 TABLET | Refills: 0 | Status: SHIPPED | OUTPATIENT
Start: 2019-08-06 | End: 2020-01-29

## 2019-08-06 RX ORDER — SIMVASTATIN 40 MG
40 TABLET ORAL
Qty: 30 TABLET | Refills: 2 | Status: SHIPPED | OUTPATIENT
Start: 2019-08-06 | End: 2019-09-25 | Stop reason: SDUPTHER

## 2019-09-05 DIAGNOSIS — F41.9 ANXIETY: ICD-10-CM

## 2019-09-05 RX ORDER — CLONAZEPAM 0.5 MG/1
0.5 TABLET ORAL
Qty: 30 TABLET | Refills: 0 | Status: SHIPPED | OUTPATIENT
Start: 2019-09-05 | End: 2019-09-25 | Stop reason: SDUPTHER

## 2019-09-18 ENCOUNTER — TELEPHONE (OUTPATIENT)
Dept: FAMILY MEDICINE CLINIC | Facility: CLINIC | Age: 69
End: 2019-09-18

## 2019-09-25 ENCOUNTER — OFFICE VISIT (OUTPATIENT)
Dept: FAMILY MEDICINE CLINIC | Facility: CLINIC | Age: 69
End: 2019-09-25
Payer: MEDICARE

## 2019-09-25 VITALS
DIASTOLIC BLOOD PRESSURE: 82 MMHG | SYSTOLIC BLOOD PRESSURE: 128 MMHG | TEMPERATURE: 98.3 F | BODY MASS INDEX: 24.66 KG/M2 | HEART RATE: 80 BPM | RESPIRATION RATE: 18 BRPM | HEIGHT: 65 IN | WEIGHT: 148 LBS | OXYGEN SATURATION: 97 %

## 2019-09-25 DIAGNOSIS — R73.9 ELEVATED BLOOD SUGAR: ICD-10-CM

## 2019-09-25 DIAGNOSIS — H35.09 RETINAL ARTERY PLAQUE: ICD-10-CM

## 2019-09-25 DIAGNOSIS — E03.9 HYPOTHYROIDISM, UNSPECIFIED TYPE: ICD-10-CM

## 2019-09-25 DIAGNOSIS — Z00.00 ENCOUNTER FOR MEDICARE ANNUAL WELLNESS EXAM: Primary | ICD-10-CM

## 2019-09-25 DIAGNOSIS — H35.041 RETINAL MICROANEURYSM OF RIGHT EYE: ICD-10-CM

## 2019-09-25 DIAGNOSIS — R42 DIZZINESS: ICD-10-CM

## 2019-09-25 DIAGNOSIS — I70.8 RETINAL ARTERY PLAQUE: ICD-10-CM

## 2019-09-25 DIAGNOSIS — Z23 NEEDS FLU SHOT: ICD-10-CM

## 2019-09-25 DIAGNOSIS — E78.5 HYPERLIPIDEMIA, UNSPECIFIED HYPERLIPIDEMIA TYPE: ICD-10-CM

## 2019-09-25 DIAGNOSIS — R73.01 IMPAIRED FASTING GLUCOSE: ICD-10-CM

## 2019-09-25 DIAGNOSIS — F41.9 ANXIETY: ICD-10-CM

## 2019-09-25 LAB — SL AMB POCT HEMOGLOBIN AIC: 5.1 (ref ?–6.5)

## 2019-09-25 PROCEDURE — 83036 HEMOGLOBIN GLYCOSYLATED A1C: CPT

## 2019-09-25 PROCEDURE — G0439 PPPS, SUBSEQ VISIT: HCPCS

## 2019-09-25 PROCEDURE — 90662 IIV NO PRSV INCREASED AG IM: CPT

## 2019-09-25 PROCEDURE — 99214 OFFICE O/P EST MOD 30 MIN: CPT

## 2019-09-25 PROCEDURE — G0008 ADMIN INFLUENZA VIRUS VAC: HCPCS

## 2019-09-25 RX ORDER — CLONAZEPAM 0.5 MG/1
0.5 TABLET ORAL
Qty: 30 TABLET | Refills: 0 | Status: SHIPPED | OUTPATIENT
Start: 2019-10-01 | End: 2019-11-01 | Stop reason: SDUPTHER

## 2019-09-25 RX ORDER — SIMVASTATIN 40 MG
40 TABLET ORAL
Qty: 30 TABLET | Refills: 2 | Status: SHIPPED | OUTPATIENT
Start: 2019-09-25 | End: 2020-01-06 | Stop reason: SDUPTHER

## 2019-09-25 RX ORDER — LEVOTHYROXINE SODIUM 0.07 MG/1
75 TABLET ORAL DAILY
Qty: 30 TABLET | Refills: 2 | Status: SHIPPED | OUTPATIENT
Start: 2019-09-25 | End: 2020-01-06 | Stop reason: SDUPTHER

## 2019-09-25 RX ORDER — DOCUSATE SODIUM 100 MG/1
100 CAPSULE, LIQUID FILLED ORAL DAILY
Qty: 30 CAPSULE | Refills: 2 | Status: SHIPPED | OUTPATIENT
Start: 2019-09-25 | End: 2019-11-01 | Stop reason: SDUPTHER

## 2019-09-25 NOTE — PROGRESS NOTES
Assessment and Plan:     Problem List Items Addressed This Visit        Other    Elevated blood sugar - Primary    Relevant Orders    POCT hemoglobin A1c      Other Visit Diagnoses     Needs flu shot        Relevant Orders    influenza vaccine, [id number], high-dose, PF 0 5 mL (FLUZONE HIGH-DOSE) (Completed)    Screening for osteoporosis               Preventive health issues were discussed with patient, and age appropriate screening tests were ordered as noted in patient's After Visit Summary  Personalized health advice and appropriate referrals for health education or preventive services given if needed, as noted in patient's After Visit Summary       History of Present Illness:     Patient presents for Medicare Annual Wellness visit    Patient Care Team:  MARGARITA Xavier as PCP - General (Family Medicine)     Problem List:     Patient Active Problem List   Diagnosis    GERD (gastroesophageal reflux disease)    Hypercholesterolemia    BPH (benign prostatic hyperplasia)    Elevated blood sugar    Elevated PSA    Hyperplastic colonic polyp      Past Medical and Surgical History:     Past Medical History:   Diagnosis Date    Benign neoplasm of colon     Chronic rhinitis     Colon polyp     Diverticular disease of colon     Dysphagia     GERD (gastroesophageal reflux disease)     Hyperlipidemia     Internal hemorrhoids without complication     PSA (psoriatic arthritis) (HCC)     elevated    Restless leg syndrome     Thyroid nodule      Past Surgical History:   Procedure Laterality Date    COLONOSCOPY  09/22/2010    COLONOSCOPY  06/13/2007    COLONOSCOPY  05/27/2004    COLONOSCOPY  12/09/2015    GALLBLADDER SURGERY      OTHER SURGICAL HISTORY  11/2012    abcessed tooth    THYROIDECTOMY      TONSILLECTOMY      UPPER GASTROINTESTINAL ENDOSCOPY  10/30/2013      Family History:     Family History   Problem Relation Age of Onset    Colon cancer Father     Other Father         metastatic: lymph tissue, breast, thyroid, prostate, liver, lung    Other Family         malignant neoplasm of gastrointestinal disease      Social History:     Social History     Socioeconomic History    Marital status: /Civil Union     Spouse name: None    Number of children: None    Years of education: None    Highest education level: None   Occupational History    None   Social Needs    Financial resource strain: None    Food insecurity:     Worry: None     Inability: None    Transportation needs:     Medical: None     Non-medical: None   Tobacco Use    Smoking status: Never Smoker    Smokeless tobacco: Never Used   Substance and Sexual Activity    Alcohol use: No    Drug use: No    Sexual activity: None   Lifestyle    Physical activity:     Days per week: None     Minutes per session: None    Stress: None   Relationships    Social connections:     Talks on phone: None     Gets together: None     Attends Caodaism service: None     Active member of club or organization: None     Attends meetings of clubs or organizations: None     Relationship status: None    Intimate partner violence:     Fear of current or ex partner: None     Emotionally abused: None     Physically abused: None     Forced sexual activity: None   Other Topics Concern    None   Social History Narrative    Consumes on average 3 cups of regular coffee per day         Medications and Allergies:     Current Outpatient Medications   Medication Sig Dispense Refill    aspirin 81 MG tablet Take 81 mg by mouth      CALCIUM/MAGNESIUM/ZINC FORMULA PO Take 1 tablet by mouth daily      clobetasol (TEMOVATE) 0 05 % ointment Apply topically as needed      clonazePAM (KlonoPIN) 0 5 mg tablet Take 1 tablet (0 5 mg total) by mouth daily at bedtime 30 tablet 0    docusate sodium (COLACE) 100 mg capsule Take 1 capsule (100 mg total) by mouth daily 30 capsule 2    GARLIC PO Take by mouth daily      levothyroxine 75 mcg tablet Take 1 tablet (75 mcg total) by mouth daily 30 tablet 2    Multiple Vitamins-Minerals (MULTIVITAMIN PO) Take by mouth daily      Omega-3 Fatty Acids (FISH OIL PO) Take by mouth daily      omeprazole (PriLOSEC) 20 mg delayed release capsule Take 20 mg by mouth      simvastatin (ZOCOR) 40 mg tablet Take 1 tablet (40 mg total) by mouth daily at bedtime 30 tablet 2    tamsulosin (FLOMAX) 0 4 mg       meclizine (ANTIVERT) 25 mg tablet Take 1 tablet (25 mg total) by mouth 3 (three) times a day as needed for dizziness for up to 3 days 18 tablet 0     No current facility-administered medications for this visit  Allergies   Allergen Reactions    Codeine       Immunizations:     Immunization History   Administered Date(s) Administered    INFLUENZA 12/05/2017    Influenza, high dose seasonal 0 5 mL 09/24/2018, 09/25/2019    Pneumococcal Conjugate 13-Valent 12/19/2018    Tdap 12/19/2018    Zoster 09/06/2016      Health Maintenance:         Topic Date Due    DXA SCAN  1950    CRC Screening: FOBTx3/FIT  06/14/2020 (Originally 5/24/2002)    CRC Screening: Colonoscopy  12/09/2020    Hepatitis C Screening  Completed     There are no preventive care reminders to display for this patient  Medicare Health Risk Assessment:     /82   Pulse 80   Temp 98 3 °F (36 8 °C) (Tympanic)   Resp 18   Ht 5' 5" (1 651 m)   Wt 67 1 kg (148 lb)   SpO2 97%   BMI 24 63 kg/m²      Eleuterio Scheuermann is here for his Subsequent Wellness visit  Health Risk Assessment:   Patient rates overall health as good  Patient feels that their physical health rating is same  Eyesight was rated as same  Hearing was rated as same  Patient feels that their emotional and mental health rating is same  Pain experienced in the last 7 days has been none  Patient states that he has experienced no weight loss or gain in last 6 months  Depression Screening:   PHQ-2 Score: 0      Fall Risk Screening:    In the past year, patient has experienced: no history of falling in past year      Home Safety:  Patient does not have trouble with stairs inside or outside of their home  Patient has working smoke alarms and has working carbon monoxide detector  Home safety hazards include: none  Nutrition:   Current diet is Regular and Limited junk food  Medications:   Patient is currently taking over-the-counter supplements  OTC medications include: see medication list  Patient is able to manage medications  Activities of Daily Living (ADLs)/Instrumental Activities of Daily Living (IADLs):   Walk and transfer into and out of bed and chair?: Yes  Dress and groom yourself?: Yes    Bathe or shower yourself?: Yes    Feed yourself? Yes  Do your laundry/housekeeping?: Yes  Manage your money, pay your bills and track your expenses?: Yes  Make your own meals?: Yes    Do your own shopping?: Yes    Previous Hospitalizations:   Any hospitalizations or ED visits within the last 12 months?: No      Advance Care Planning:   Living will: Yes    Advanced directive: Yes    Advanced directive counseling given: Yes      PREVENTIVE SCREENINGS      Cardiovascular Screening:    General: Screening Not Indicated and History Lipid Disorder      Diabetes Screening:     General: Screening Current    Due for: Blood Glucose      Colorectal Cancer Screening:     General: Screening Current      Prostate Cancer Screening:    General: Screening Current      Osteoporosis Screening:    General: Screening Not Indicated      Abdominal Aortic Aneurysm (AAA) Screening:    Risk factors include: age between 73-67 yo        Lung Cancer Screening:     General: Screening Not Indicated      Hepatitis C Screening:    General: Screening Current    Other Counseling Topics:   Car/seat belt/driving safety and calcium and vitamin D intake and regular weightbearing exercise         MARGARITA cEheverria

## 2019-09-25 NOTE — PROGRESS NOTES
Assessment and Plan:     Problem List Items Addressed This Visit        Other    Elevated blood sugar    Relevant Orders    POCT hemoglobin A1c (Completed)    Comprehensive metabolic panel      Other Visit Diagnoses     Encounter for Medicare annual wellness exam    -  Primary    Needs flu shot        Relevant Orders    influenza vaccine, 9834-4708, high-dose, PF 0 5 mL (FLUZONE HIGH-DOSE) (Completed)    Hypothyroidism, unspecified type        Relevant Medications    levothyroxine 75 mcg tablet    Other Relevant Orders    TSH, 3rd generation    Anxiety        Relevant Medications    clonazePAM (KlonoPIN) 0 5 mg tablet (Start on 10/1/2019)    Hyperlipidemia, unspecified hyperlipidemia type        Relevant Medications    simvastatin (ZOCOR) 40 mg tablet    docusate sodium (COLACE) 100 mg capsule    Other Relevant Orders    Lipid panel    Impaired fasting glucose        Relevant Orders    Comprehensive metabolic panel    Dizziness        Relevant Orders    VAS carotid complete study    Retinal artery plaque        Relevant Orders    VAS carotid complete study    Retinal microaneurysm of right eye        Relevant Orders    VAS carotid complete study           Preventive health issues were discussed with patient, and age appropriate screening tests were ordered as noted in patient's After Visit Summary  Personalized health advice and appropriate referrals for health education or preventive services given if needed, as noted in patient's After Visit Summary       History of Present Illness:     Patient presents for Medicare Annual Wellness visit    Patient Care Team:  Taylor Rey as PCP - General (Family Medicine)     Problem List:     Patient Active Problem List   Diagnosis    GERD (gastroesophageal reflux disease)    Hypercholesterolemia    BPH (benign prostatic hyperplasia)    Elevated blood sugar    Elevated PSA    Hyperplastic colonic polyp      Past Medical and Surgical History:     Past Medical History:   Diagnosis Date    Benign neoplasm of colon     Chronic rhinitis     Colon polyp     Diverticular disease of colon     Dysphagia     GERD (gastroesophageal reflux disease)     Hyperlipidemia     Internal hemorrhoids without complication     PSA (psoriatic arthritis) (HCC)     elevated    Restless leg syndrome     Thyroid nodule      Past Surgical History:   Procedure Laterality Date    COLONOSCOPY  09/22/2010    COLONOSCOPY  06/13/2007    COLONOSCOPY  05/27/2004    COLONOSCOPY  12/09/2015    GALLBLADDER SURGERY      OTHER SURGICAL HISTORY  11/2012    abcessed tooth    THYROIDECTOMY      TONSILLECTOMY      UPPER GASTROINTESTINAL ENDOSCOPY  10/30/2013      Family History:     Family History   Problem Relation Age of Onset    Colon cancer Father     Other Father         metastatic: lymph tissue, breast, thyroid, prostate, liver, lung    Other Family         malignant neoplasm of gastrointestinal disease      Social History:     Social History     Socioeconomic History    Marital status: /Civil Union     Spouse name: None    Number of children: None    Years of education: None    Highest education level: None   Occupational History    None   Social Needs    Financial resource strain: None    Food insecurity:     Worry: None     Inability: None    Transportation needs:     Medical: None     Non-medical: None   Tobacco Use    Smoking status: Never Smoker    Smokeless tobacco: Never Used   Substance and Sexual Activity    Alcohol use: No    Drug use: No    Sexual activity: None   Lifestyle    Physical activity:     Days per week: None     Minutes per session: None    Stress: None   Relationships    Social connections:     Talks on phone: None     Gets together: None     Attends [employer] service: None     Active member of club or organization: None     Attends meetings of clubs or organizations: None     Relationship status: None    Intimate partner violence: Fear of current or ex partner: None     Emotionally abused: None     Physically abused: None     Forced sexual activity: None   Other Topics Concern    None   Social History Narrative    Consumes on average 3 cups of regular coffee per day  Medications and Allergies:     Current Outpatient Medications   Medication Sig Dispense Refill    aspirin 81 MG tablet Take 81 mg by mouth      CALCIUM/MAGNESIUM/ZINC FORMULA PO Take 1 tablet by mouth daily      clobetasol (TEMOVATE) 0 05 % ointment Apply topically as needed      [START ON 10/1/2019] clonazePAM (KlonoPIN) 0 5 mg tablet Take 1 tablet (0 5 mg total) by mouth daily at bedtime 30 tablet 0    docusate sodium (COLACE) 100 mg capsule Take 1 capsule (100 mg total) by mouth daily 30 capsule 2    GARLIC PO Take by mouth daily      levothyroxine 75 mcg tablet Take 1 tablet (75 mcg total) by mouth daily 30 tablet 2    Multiple Vitamins-Minerals (MULTIVITAMIN PO) Take by mouth daily      Omega-3 Fatty Acids (FISH OIL PO) Take by mouth daily      omeprazole (PriLOSEC) 20 mg delayed release capsule Take 20 mg by mouth      simvastatin (ZOCOR) 40 mg tablet Take 1 tablet (40 mg total) by mouth daily at bedtime 30 tablet 2    tamsulosin (FLOMAX) 0 4 mg       meclizine (ANTIVERT) 25 mg tablet Take 1 tablet (25 mg total) by mouth 3 (three) times a day as needed for dizziness for up to 3 days 18 tablet 0     No current facility-administered medications for this visit        Allergies   Allergen Reactions    Codeine       Immunizations:     Immunization History   Administered Date(s) Administered    INFLUENZA 12/05/2017    Influenza, high dose seasonal 0 5 mL 09/24/2018, 09/25/2019    Pneumococcal Conjugate 13-Valent 12/19/2018    Tdap 12/19/2018    Zoster 09/06/2016      Health Maintenance:         Topic Date Due    DXA SCAN  1950    CRC Screening: FOBTx3/FIT  06/14/2020 (Originally 5/24/2002)    CRC Screening: Colonoscopy  12/09/2020    Hepatitis C Screening  Completed     There are no preventive care reminders to display for this patient  Medicare Health Risk Assessment:     /82   Pulse 80   Temp 98 3 °F (36 8 °C) (Tympanic)   Resp 18   Ht 5' 5" (1 651 m)   Wt 67 1 kg (148 lb)   SpO2 97%   BMI 24 63 kg/m²      Igor Wu is here for his Subsequent Wellness visit  Health Risk Assessment:   Patient rates overall health as good  Patient feels that their physical health rating is same  Eyesight was rated as same  Hearing was rated as same  Patient feels that their emotional and mental health rating is same  Pain experienced in the last 7 days has been none  Patient states that he has experienced no weight loss or gain in last 6 months  Depression Screening:   PHQ-2 Score: 0      Fall Risk Screening: In the past year, patient has experienced: no history of falling in past year      Home Safety:  Patient does not have trouble with stairs inside or outside of their home  Patient has working smoke alarms and has working carbon monoxide detector  Home safety hazards include: none  Nutrition:   Current diet is Regular and Limited junk food  Medications:   Patient is currently taking over-the-counter supplements  OTC medications include: see medication list  Patient is able to manage medications  Activities of Daily Living (ADLs)/Instrumental Activities of Daily Living (IADLs):   Walk and transfer into and out of bed and chair?: Yes  Dress and groom yourself?: Yes    Bathe or shower yourself?: Yes    Feed yourself?  Yes  Do your laundry/housekeeping?: Yes  Manage your money, pay your bills and track your expenses?: Yes  Make your own meals?: Yes    Do your own shopping?: Yes    Previous Hospitalizations:   Any hospitalizations or ED visits within the last 12 months?: No      Advance Care Planning:   Living will: Yes    Advanced directive: Yes    Advanced directive counseling given: Yes      PREVENTIVE SCREENINGS      Cardiovascular Screening:    General: Screening Not Indicated and History Lipid Disorder      Diabetes Screening:     General: Screening Current    Due for: Blood Glucose      Colorectal Cancer Screening:     General: Screening Current      Prostate Cancer Screening:    General: Screening Current      Osteoporosis Screening:    General: Screening Not Indicated      Abdominal Aortic Aneurysm (AAA) Screening:    Risk factors include: age between 73-67 yo        Lung Cancer Screening:     General: Screening Not Indicated      Hepatitis C Screening:    General: Screening Current    Other Counseling Topics:   Car/seat belt/driving safety and calcium and vitamin D intake and regular weightbearing exercise         MARGARITA Holguin

## 2019-09-25 NOTE — PROGRESS NOTES
[address] [place] [address] Primary Care        NAME: Kaya Polo is a 81 y o  male  : 1950    MRN: [id number]  DATE: 2019  TIME: 10:03 AM    Assessment and Plan   Encounter for Medicare annual wellness exam [Z00 00]  1  Encounter for Medicare annual wellness exam     2  Elevated blood sugar  POCT hemoglobin A1c    Comprehensive metabolic panel   3  Needs flu shot  influenza vaccine, 5135-8124, high-dose, PF 0 5 mL (FLUZONE HIGH-DOSE)   4  Hypothyroidism, unspecified type  levothyroxine 75 mcg tablet    TSH, 3rd generation   5  Anxiety  clonazePAM (KlonoPIN) 0 5 mg tablet   6  Hyperlipidemia, unspecified hyperlipidemia type  simvastatin (ZOCOR) 40 mg tablet    docusate sodium (COLACE) 100 mg capsule    Lipid panel   7  Impaired fasting glucose  Comprehensive metabolic panel   8  Dizziness  VAS carotid complete study   9  Retinal artery plaque  VAS carotid complete study   10  Retinal microaneurysm of right eye  VAS carotid complete study         Patient Instructions     Patient Instructions       Medicare Preventive Visit Patient Instructions  Thank you for completing your Welcome to Medicare Visit or Medicare Annual Wellness Visit today  Your next wellness visit will be due in one year (2020)  The screening/preventive services that you may require over the next 5-10 years are detailed below  Some tests may not apply to you based off risk factors and/or age  Screening tests ordered at today's visit but not completed yet may show as past due  Also, please note that scanned in results may not display below    Preventive Screenings:  Service Recommendations Previous Testing/Comments   Colorectal Cancer Screening  · Colonoscopy    · Fecal Occult Blood Test (FOBT)/Fecal Immunochemical Test (FIT)  · Fecal DNA/Cologuard Test  · Flexible Sigmoidoscopy Age: 54-65 years old   Colonoscopy: every 10 years (May be performed more frequently if at higher risk)  OR  FOBT/FIT: every 1 year  OR  Cologuard: every 3 years  OR  Sigmoidoscopy: every 5 years  Screening may be recommended earlier than age 48 if at higher risk for colorectal cancer  Also, an individualized decision between you and your healthcare provider will decide whether screening between the ages of 74-80 would be appropriate  Colonoscopy: 12/09/2015  FOBT/FIT: Not on file  Cologuard: Not on file  Sigmoidoscopy: Not on file    Screening Current     Prostate Cancer Screening Individualized decision between patient and health care provider in men between ages of 53-78   Medicare will cover every 12 months beginning on the day after your 50th birthday PSA: 7 2 ng/mL     Screening Current     Hepatitis C Screening Once for adults born between 1945 and 1965  More frequently in patients at high risk for Hepatitis C Hep C Antibody: 12/31/2018    Screening Current   Diabetes Screening 1-2 times per year if you're at risk for diabetes or have pre-diabetes Fasting glucose: 111 mg/dL   A1C: 6 2 %    Screening Current   Cholesterol Screening Once every 5 years if you don't have a lipid disorder  May order more often based on risk factors  Lipid panel: 05/14/2019    Screening Not Indicated  History Lipid Disorder      Other Preventive Screenings Covered by Medicare:  1  Abdominal Aortic Aneurysm (AAA) Screening: covered once if your at risk  You're considered to be at risk if you have a family history of AAA or a male between the age of 73-68 who smoking at least 100 cigarettes in your lifetime  2  Lung Cancer Screening: covers low dose CT scan once per year if you meet all of the following conditions: (1) Age 50-69; (2) No signs or symptoms of lung cancer; (3) Current smoker or have quit smoking within the last 15 years; (4) You have a tobacco smoking history of at least 30 pack years (packs per day x number of years you smoked); (5) You get a written order from a healthcare provider    3  Glaucoma Screening: covered annually if you're considered high risk: (1) You have diabetes OR (2) Family history of glaucoma OR (3)  aged 48 and older OR (3)  American aged 72 and older  3  Osteoporosis Screening: covered every 2 years if you meet one of the following conditions: (1) Have a vertebral abnormality; (2) On glucocorticoid therapy for more than 3 months; (3) Have primary hyperparathyroidism; (4) On osteoporosis medications and need to assess response to drug therapy  5  HIV Screening: covered annually if you're between the age of 12-76  Also covered annually if you are younger than 13 and older than 72 with risk factors for HIV infection  For pregnant patients, it is covered up to 3 times per pregnancy  Immunizations:  Immunization Recommendations   Influenza Vaccine Annual influenza vaccination during flu season is recommended for all persons aged >= 6 months who do not have contraindications   Pneumococcal Vaccine (Prevnar and Pneumovax)  * Prevnar = PCV13  * Pneumovax = PPSV23 Adults 25-60 years old: 1-3 doses may be recommended based on certain risk factors  Adults 72 years old: Prevnar (PCV13) vaccine recommended followed by Pneumovax (PPSV23) vaccine  If already received PPSV23 since turning 65, then PCV13 recommended at least one year after PPSV23 dose  Hepatitis B Vaccine 3 dose series if at intermediate or high risk (ex: diabetes, end stage renal disease, liver disease)   Tetanus (Td) Vaccine - COST NOT COVERED BY MEDICARE PART B Following completion of primary series, a booster dose should be given every 10 years to maintain immunity against tetanus  Td may also be given as tetanus wound prophylaxis  Tdap Vaccine - COST NOT COVERED BY MEDICARE PART B Recommended at least once for all adults  For pregnant patients, recommended with each pregnancy     Shingles Vaccine (Shingrix) - COST NOT COVERED BY MEDICARE PART B  2 shot series recommended in those aged 48 and above     Health Maintenance Due:      Topic Date Due    DXA SCAN  1950  CRC Screening: FOBTx3/FIT  06/14/2020 (Originally 5/24/2002)    CRC Screening: Colonoscopy  12/09/2020    Hepatitis C Screening  Completed     Immunizations Due:  There are no preventive care reminders to display for this patient  Advance Directives   What are advance directives? Advance directives are legal documents that state your wishes and plans for medical care  These plans are made ahead of time in case you lose your ability to make decisions for yourself  Advance directives can apply to any medical decision, such as the treatments you want, and if you want to donate organs  What are the types of advance directives? There are many types of advance directives, and each state has rules about how to use them  You may choose a combination of any of the following:  · Living will: This is a written record of the treatment you want  You can also choose which treatments you do not want, which to limit, and which to stop at a certain time  This includes surgery, medicine, IV fluid, and tube feedings  · Durable power of  for healthcare [facility]): This is a written record that states who you want to make healthcare choices for you when you are unable to make them for yourself  This person, called a proxy, is usually a family member or a friend  You may choose more than 1 proxy  · Do not resuscitate (DNR) order:  A DNR order is used in case your heart stops beating or you stop breathing  It is a request not to have certain forms of treatment, such as CPR  A DNR order may be included in other types of advance directives  · Medical directive: This covers the care that you want if you are in a coma, near death, or unable to make decisions for yourself  You can list the treatments you want for each condition  Treatment may include pain medicine, surgery, blood transfusions, dialysis, IV or tube feedings, and a ventilator (breathing machine)  · Values history:   This document has questions about your views, beliefs, and how you feel and think about life  This information can help others choose the care that you would choose  Why are advance directives important? An advance directive helps you control your care  Although spoken wishes may be used, it is better to have your wishes written down  Spoken wishes can be misunderstood, or not followed  Treatments may be given even if you do not want them  An advance directive may make it easier for your family to make difficult choices about your care  © Copyright [employer] 2018 Information is for End User's use only and may not be sold, redistributed or otherwise used for commercial purposes  All illustrations and images included in CareNotes® are the copyrighted property of [employer] D [employer] M [employer]  or [address] [place] [address]          Chief Complaint     Chief Complaint   Patient presents with    Medicare Wellness Visit    Follow-up    Diabetes         History of Present Illness       Patient here for follow up on GERD, HLD, RSL,    Needs refills today  RSL- take clonopin at night which seems to work well  No complaints  Omeprazole for GERD  No flare ups  Carotid U/s ordered, patient reports Dr Lopez Amaya has ordered many in the past for screening tests  Was recently at a retinal specialist for follow up as a few months ago they had seen a cholesterol plaque on exam, it has cleared up but recommended getting a new carotid doppler done  Lipid panel was Normal 4 months ago  Review of Systems   Review of Systems   Constitutional: Negative for activity change, appetite change, chills, fatigue and fever  HENT: Negative for congestion, ear pain, nosebleeds, rhinorrhea and sore throat  Eyes: Negative for photophobia, pain, redness and visual disturbance  Respiratory: Negative for cough, shortness of breath and wheezing  Cardiovascular: Negative  Negative for chest pain  Gastrointestinal: Negative    Negative for abdominal pain, constipation, diarrhea and vomiting  Endocrine: Negative  Genitourinary: Negative for difficulty urinating, dysuria and flank pain  Musculoskeletal: Negative  Skin: Negative for color change and rash  Neurological: Negative for dizziness, weakness, numbness and headaches  Hematological: Negative for adenopathy  Psychiatric/Behavioral: Negative for agitation and confusion  The patient is not nervous/anxious          PHQ-9 Depression Screening    PHQ-9:    Frequency of the following problems over the past two weeks:       Little interest or pleasure in doing things:  0 - not at all  Feeling down, depressed, or hopeless:  0 - not at all  PHQ-2 Score:  0        Current Medications       Current Outpatient Medications:     aspirin 81 MG tablet, Take 81 mg by mouth, Disp: , Rfl:     CALCIUM/MAGNESIUM/ZINC FORMULA PO, Take 1 tablet by mouth daily, Disp: , Rfl:     clobetasol (TEMOVATE) 0 05 % ointment, Apply topically as needed, Disp: , Rfl:     [START ON 10/1/2019] clonazePAM (KlonoPIN) 0 5 mg tablet, Take 1 tablet (0 5 mg total) by mouth daily at bedtime, Disp: 30 tablet, Rfl: 0    docusate sodium (COLACE) 100 mg capsule, Take 1 capsule (100 mg total) by mouth daily, Disp: 30 capsule, Rfl: 2    GARLIC PO, Take by mouth daily, Disp: , Rfl:     levothyroxine 75 mcg tablet, Take 1 tablet (75 mcg total) by mouth daily, Disp: 30 tablet, Rfl: 2    Multiple Vitamins-Minerals (MULTIVITAMIN PO), Take by mouth daily, Disp: , Rfl:     Omega-3 Fatty Acids (FISH OIL PO), Take by mouth daily, Disp: , Rfl:     omeprazole (PriLOSEC) 20 mg delayed release capsule, Take 20 mg by mouth, Disp: , Rfl:     simvastatin (ZOCOR) 40 mg tablet, Take 1 tablet (40 mg total) by mouth daily at bedtime, Disp: 30 tablet, Rfl: 2    tamsulosin (FLOMAX) 0 4 mg, , Disp: , Rfl:     meclizine (ANTIVERT) 25 mg tablet, Take 1 tablet (25 mg total) by mouth 3 (three) times a day as needed for dizziness for up to 3 days, Disp: 18 tablet, Rfl: 0    Current Allergies     Allergies as of 09/25/2019 - Reviewed 09/25/2019   Allergen Reaction Noted    Codeine  08/07/2018            The following portions of the patient's history were reviewed and updated as appropriate: allergies, current medications, past family history, past medical history, past social history, past surgical history and problem list      Past Medical History:   Diagnosis Date    Benign neoplasm of colon     Chronic rhinitis     Colon polyp     Diverticular disease of colon     Dysphagia     GERD (gastroesophageal reflux disease)     Hyperlipidemia     Internal hemorrhoids without complication     PSA (psoriatic arthritis) ([facility] [address] )     elevated    Restless leg syndrome     Thyroid nodule        Past Surgical History:   Procedure Laterality Date    COLONOSCOPY  09/22/2010    COLONOSCOPY  06/13/2007    COLONOSCOPY  05/27/2004    COLONOSCOPY  12/09/2015    GALLBLADDER SURGERY      OTHER SURGICAL HISTORY  11/2012    abcessed tooth    THYROIDECTOMY      TONSILLECTOMY      UPPER GASTROINTESTINAL ENDOSCOPY  10/30/2013       Family History   Problem Relation Age of Onset    Colon cancer Father     Other Father         metastatic: lymph tissue, breast, thyroid, prostate, liver, lung    Other Family         malignant neoplasm of gastrointestinal disease         Medications have been verified  Objective   /82   Pulse 80   Temp 98 3 °F (36 8 °C) (Tympanic)   Resp 18   Ht 5' 5" (1 651 m)   Wt 67 1 kg (148 lb)   SpO2 97%   BMI 24 63 kg/m²        Physical Exam     Physical Exam   Constitutional: He is oriented to person, place, and time  He appears well-developed and well-nourished  He is cooperative  He does not appear ill  No distress  Eyes: Lids are normal    Cardiovascular: Normal rate, regular rhythm, S1 normal, S2 normal, normal heart sounds and intact distal pulses  Exam reveals no gallop and no friction rub  No murmur heard  No carotid bruits noted  Pulmonary/Chest: Effort normal and breath sounds normal  No respiratory distress  He has no decreased breath sounds  He has no wheezes  Abdominal: Normal appearance  Musculoskeletal: Normal range of motion  He exhibits no edema, tenderness or deformity  Neurological: He is alert and oriented to person, place, and time  Skin: Skin is warm  No rash noted  No erythema  Psychiatric: He has a normal mood and affect  His behavior is normal  Thought content normal    Nursing note and vitals reviewed

## 2019-09-25 NOTE — PATIENT INSTRUCTIONS
Medicare Preventive Visit Patient Instructions  Thank you for completing your Welcome to Medicare Visit or Medicare Annual Wellness Visit today  Your next wellness visit will be due in one year (9/25/2020)  The screening/preventive services that you may require over the next 5-10 years are detailed below  Some tests may not apply to you based off risk factors and/or age  Screening tests ordered at today's visit but not completed yet may show as past due  Also, please note that scanned in results may not display below  Preventive Screenings:  Service Recommendations Previous Testing/Comments   Colorectal Cancer Screening  · Colonoscopy    · Fecal Occult Blood Test (FOBT)/Fecal Immunochemical Test (FIT)  · Fecal DNA/Cologuard Test  · Flexible Sigmoidoscopy Age: 54-65 years old   Colonoscopy: every 10 years (May be performed more frequently if at higher risk)  OR  FOBT/FIT: every 1 year  OR  Cologuard: every 3 years  OR  Sigmoidoscopy: every 5 years  Screening may be recommended earlier than age 48 if at higher risk for colorectal cancer  Also, an individualized decision between you and your healthcare provider will decide whether screening between the ages of 74-80 would be appropriate   Colonoscopy: 12/09/2015  FOBT/FIT: Not on file  Cologuard: Not on file  Sigmoidoscopy: Not on file    Screening Current     Prostate Cancer Screening Individualized decision between patient and health care provider in men between ages of 53-78   Medicare will cover every 12 months beginning on the day after your 50th birthday PSA: 7 2 ng/mL     Screening Current     Hepatitis C Screening Once for adults born between 1945 and 1965  More frequently in patients at high risk for Hepatitis C Hep C Antibody: 12/31/2018    Screening Current   Diabetes Screening 1-2 times per year if you're at risk for diabetes or have pre-diabetes Fasting glucose: 111 mg/dL   A1C: 6 2 %    Screening Current   Cholesterol Screening Once every 5 years if you don't have a lipid disorder  May order more often based on risk factors  Lipid panel: 05/14/2019    Screening Not Indicated  History Lipid Disorder      Other Preventive Screenings Covered by Medicare:  1  Abdominal Aortic Aneurysm (AAA) Screening: covered once if your at risk  You're considered to be at risk if you have a family history of AAA or a male between the age of 73-68 who smoking at least 100 cigarettes in your lifetime  2  Lung Cancer Screening: covers low dose CT scan once per year if you meet all of the following conditions: (1) Age 50-69; (2) No signs or symptoms of lung cancer; (3) Current smoker or have quit smoking within the last 15 years; (4) You have a tobacco smoking history of at least 30 pack years (packs per day x number of years you smoked); (5) You get a written order from a healthcare provider  3  Glaucoma Screening: covered annually if you're considered high risk: (1) You have diabetes OR (2) Family history of glaucoma OR (3)  aged 48 and older OR (3)  American aged 72 and older  3  Osteoporosis Screening: covered every 2 years if you meet one of the following conditions: (1) Have a vertebral abnormality; (2) On glucocorticoid therapy for more than 3 months; (3) Have primary hyperparathyroidism; (4) On osteoporosis medications and need to assess response to drug therapy  5  HIV Screening: covered annually if you're between the age of 12-76  Also covered annually if you are younger than 13 and older than 72 with risk factors for HIV infection  For pregnant patients, it is covered up to 3 times per pregnancy      Immunizations:  Immunization Recommendations   Influenza Vaccine Annual influenza vaccination during flu season is recommended for all persons aged >= 6 months who do not have contraindications   Pneumococcal Vaccine (Prevnar and Pneumovax)  * Prevnar = PCV13  * Pneumovax = PPSV23 Adults 25-60 years old: 1-3 doses may be recommended based on certain risk factors  Adults 72 years old: Prevnar (PCV13) vaccine recommended followed by Pneumovax (PPSV23) vaccine  If already received PPSV23 since turning 65, then PCV13 recommended at least one year after PPSV23 dose  Hepatitis B Vaccine 3 dose series if at intermediate or high risk (ex: diabetes, end stage renal disease, liver disease)   Tetanus (Td) Vaccine - COST NOT COVERED BY MEDICARE PART B Following completion of primary series, a booster dose should be given every 10 years to maintain immunity against tetanus  Td may also be given as tetanus wound prophylaxis  Tdap Vaccine - COST NOT COVERED BY MEDICARE PART B Recommended at least once for all adults  For pregnant patients, recommended with each pregnancy  Shingles Vaccine (Shingrix) - COST NOT COVERED BY MEDICARE PART B  2 shot series recommended in those aged 48 and above     Health Maintenance Due:      Topic Date Due    DXA SCAN  1950    CRC Screening: FOBTx3/FIT  06/14/2020 (Originally 5/24/2002)    CRC Screening: Colonoscopy  12/09/2020    Hepatitis C Screening  Completed     Immunizations Due:  There are no preventive care reminders to display for this patient  Advance Directives   What are advance directives? Advance directives are legal documents that state your wishes and plans for medical care  These plans are made ahead of time in case you lose your ability to make decisions for yourself  Advance directives can apply to any medical decision, such as the treatments you want, and if you want to donate organs  What are the types of advance directives? There are many types of advance directives, and each state has rules about how to use them  You may choose a combination of any of the following:  · Living will: This is a written record of the treatment you want  You can also choose which treatments you do not want, which to limit, and which to stop at a certain time  This includes surgery, medicine, IV fluid, and tube feedings  · Durable power of  for healthcare [place] [facility]): This is a written record that states who you want to make healthcare choices for you when you are unable to make them for yourself  This person, called a proxy, is usually a family member or a friend  You may choose more than 1 proxy  · Do not resuscitate (DNR) order:  A DNR order is used in case your heart stops beating or you stop breathing  It is a request not to have certain forms of treatment, such as CPR  A DNR order may be included in other types of advance directives  · Medical directive: This covers the care that you want if you are in a coma, near death, or unable to make decisions for yourself  You can list the treatments you want for each condition  Treatment may include pain medicine, surgery, blood transfusions, dialysis, IV or tube feedings, and a ventilator (breathing machine)  · Values history: This document has questions about your views, beliefs, and how you feel and think about life  This information can help others choose the care that you would choose  Why are advance directives important? An advance directive helps you control your care  Although spoken wishes may be used, it is better to have your wishes written down  Spoken wishes can be misunderstood, or not followed  Treatments may be given even if you do not want them  An advance directive may make it easier for your family to make difficult choices about your care  © Copyright [employer] 2018 Information is for End User's use only and may not be sold, redistributed or otherwise used for commercial purposes   All illustrations and images included in CareNotes® are the copyrighted property of A D A M , Inc  or [address] [place] [address] [employer]

## 2019-10-16 ENCOUNTER — HOSPITAL ENCOUNTER (OUTPATIENT)
Dept: NON INVASIVE DIAGNOSTICS | Facility: HOSPITAL | Age: 69
Discharge: HOME | End: 2019-10-16
Payer: MEDICARE

## 2019-10-16 DIAGNOSIS — R42 DIZZINESS: ICD-10-CM

## 2019-10-16 DIAGNOSIS — H35.041 RETINAL MICROANEURYSM OF RIGHT EYE: ICD-10-CM

## 2019-10-16 DIAGNOSIS — I70.8 RETINAL ARTERY PLAQUE: ICD-10-CM

## 2019-10-16 DIAGNOSIS — H35.09 RETINAL ARTERY PLAQUE: ICD-10-CM

## 2019-10-16 PROCEDURE — 93880 EXTRACRANIAL BILAT STUDY: CPT | Performed by: SURGERY

## 2019-10-16 PROCEDURE — 93880 EXTRACRANIAL BILAT STUDY: CPT

## 2019-11-01 DIAGNOSIS — F41.9 ANXIETY: ICD-10-CM

## 2019-11-01 DIAGNOSIS — E78.5 HYPERLIPIDEMIA, UNSPECIFIED HYPERLIPIDEMIA TYPE: ICD-10-CM

## 2019-11-01 RX ORDER — DOCUSATE SODIUM 100 MG/1
100 CAPSULE, LIQUID FILLED ORAL DAILY
Qty: 30 CAPSULE | Refills: 2 | Status: SHIPPED | OUTPATIENT
Start: 2019-11-01 | End: 2020-04-30

## 2019-11-01 RX ORDER — CLONAZEPAM 0.5 MG/1
0.5 TABLET ORAL
Qty: 30 TABLET | Refills: 0 | Status: SHIPPED | OUTPATIENT
Start: 2019-11-01 | End: 2019-12-03 | Stop reason: SDUPTHER

## 2019-11-01 NOTE — TELEPHONE ENCOUNTER
Patient called needing refills on his   1) Docusate Sodium (Colace) 100mg  Capsules, 1 capsule        Daily by mouth  2) Clonazepam 0 5mg 1 tab daily by mouth   With refills called into [employer] [facility]

## 2019-11-04 ENCOUNTER — APPOINTMENT (OUTPATIENT)
Dept: LAB | Facility: HOSPITAL | Age: 69
End: 2019-11-04
Payer: MEDICARE

## 2019-11-04 ENCOUNTER — TRANSCRIBE ORDERS (OUTPATIENT)
Dept: ADMINISTRATIVE | Facility: HOSPITAL | Age: 69
End: 2019-11-04

## 2019-11-04 DIAGNOSIS — R73.01 IMPAIRED FASTING GLUCOSE: ICD-10-CM

## 2019-11-04 DIAGNOSIS — E03.9 HYPOTHYROIDISM, UNSPECIFIED TYPE: ICD-10-CM

## 2019-11-04 DIAGNOSIS — R73.9 ELEVATED BLOOD SUGAR: ICD-10-CM

## 2019-11-04 DIAGNOSIS — E78.5 HYPERLIPIDEMIA, UNSPECIFIED HYPERLIPIDEMIA TYPE: ICD-10-CM

## 2019-11-04 LAB
ALBUMIN SERPL BCP-MCNC: 4.5 G/DL (ref 3.5–5)
ALP SERPL-CCNC: 54 U/L (ref 46–116)
ALT SERPL W P-5'-P-CCNC: 24 U/L (ref 12–78)
ANION GAP SERPL CALCULATED.3IONS-SCNC: 7 MMOL/L (ref 4–13)
AST SERPL W P-5'-P-CCNC: 17 U/L (ref 5–45)
BILIRUB SERPL-MCNC: 0.58 MG/DL (ref 0.2–1)
BUN SERPL-MCNC: 17 MG/DL (ref 5–25)
CALCIUM SERPL-MCNC: 9.5 MG/DL (ref 8.3–10.1)
CHLORIDE SERPL-SCNC: 109 MMOL/L (ref 100–108)
CHOLEST SERPL-MCNC: 147 MG/DL (ref 50–200)
CO2 SERPL-SCNC: 28 MMOL/L (ref 21–32)
CREAT SERPL-MCNC: 0.95 MG/DL (ref 0.6–1.3)
GFR SERPL CREATININE-BSD FRML MDRD: 82 ML/MIN/1.73SQ M
GLUCOSE P FAST SERPL-MCNC: 101 MG/DL (ref 65–99)
HDLC SERPL-MCNC: 43 MG/DL
LDLC SERPL CALC-MCNC: 90 MG/DL (ref 0–100)
NONHDLC SERPL-MCNC: 104 MG/DL
POTASSIUM SERPL-SCNC: 4.3 MMOL/L (ref 3.5–5.3)
PROT SERPL-MCNC: 7.2 G/DL (ref 6.4–8.2)
SODIUM SERPL-SCNC: 144 MMOL/L (ref 136–145)
TRIGL SERPL-MCNC: 69 MG/DL
TSH SERPL DL<=0.05 MIU/L-ACNC: 1.12 UIU/ML (ref 0.36–3.74)

## 2019-11-04 PROCEDURE — 84443 ASSAY THYROID STIM HORMONE: CPT

## 2019-11-04 PROCEDURE — 80061 LIPID PANEL: CPT

## 2019-11-04 PROCEDURE — 80053 COMPREHEN METABOLIC PANEL: CPT

## 2019-11-04 PROCEDURE — 36415 COLL VENOUS BLD VENIPUNCTURE: CPT

## 2019-12-03 DIAGNOSIS — F41.9 ANXIETY: ICD-10-CM

## 2019-12-03 RX ORDER — CLONAZEPAM 0.5 MG/1
0.5 TABLET ORAL
Qty: 30 TABLET | Refills: 0 | Status: SHIPPED | OUTPATIENT
Start: 2019-12-03 | End: 2020-01-06 | Stop reason: SDUPTHER

## 2020-01-06 DIAGNOSIS — E03.9 HYPOTHYROIDISM, UNSPECIFIED TYPE: ICD-10-CM

## 2020-01-06 DIAGNOSIS — F41.9 ANXIETY: ICD-10-CM

## 2020-01-06 DIAGNOSIS — E78.5 HYPERLIPIDEMIA, UNSPECIFIED HYPERLIPIDEMIA TYPE: ICD-10-CM

## 2020-01-06 RX ORDER — SIMVASTATIN 40 MG
40 TABLET ORAL
Qty: 30 TABLET | Refills: 3 | Status: SHIPPED | OUTPATIENT
Start: 2020-01-06 | End: 2020-04-30

## 2020-01-06 RX ORDER — CLONAZEPAM 0.5 MG/1
0.5 TABLET ORAL
Qty: 30 TABLET | Refills: 3 | Status: SHIPPED | OUTPATIENT
Start: 2020-01-06 | End: 2020-04-30

## 2020-01-06 RX ORDER — LEVOTHYROXINE SODIUM 0.07 MG/1
75 TABLET ORAL DAILY
Qty: 30 TABLET | Refills: 3 | Status: SHIPPED | OUTPATIENT
Start: 2020-01-06 | End: 2020-04-30

## 2020-01-29 ENCOUNTER — OFFICE VISIT (OUTPATIENT)
Dept: FAMILY MEDICINE CLINIC | Facility: CLINIC | Age: 70
End: 2020-01-29
Payer: MEDICARE

## 2020-01-29 VITALS
RESPIRATION RATE: 16 BRPM | OXYGEN SATURATION: 98 % | BODY MASS INDEX: 26.5 KG/M2 | HEIGHT: 62 IN | HEART RATE: 78 BPM | WEIGHT: 144 LBS | SYSTOLIC BLOOD PRESSURE: 114 MMHG | DIASTOLIC BLOOD PRESSURE: 66 MMHG | TEMPERATURE: 98.1 F

## 2020-01-29 DIAGNOSIS — E03.9 HYPOTHYROIDISM, UNSPECIFIED TYPE: Primary | ICD-10-CM

## 2020-01-29 DIAGNOSIS — K21.00 GASTROESOPHAGEAL REFLUX DISEASE WITH ESOPHAGITIS: ICD-10-CM

## 2020-01-29 DIAGNOSIS — R97.20 ELEVATED PSA: ICD-10-CM

## 2020-01-29 DIAGNOSIS — Z13.820 SCREENING FOR OSTEOPOROSIS: ICD-10-CM

## 2020-01-29 DIAGNOSIS — E78.00 HYPERCHOLESTEROLEMIA: ICD-10-CM

## 2020-01-29 PROCEDURE — 99214 OFFICE O/P EST MOD 30 MIN: CPT | Performed by: NURSE PRACTITIONER

## 2020-01-29 NOTE — PROGRESS NOTES
[facility]        NAME: Sudarshan Mirza is a 81 y o  male  : 1950    MRN: [id number]  DATE: 2020  TIME: 12:56 PM    Assessment and Plan   Screening for osteoporosis [Z13 820]  1  Screening for osteoporosis     2  Hypercholesterolemia  Lipid panel    Comprehensive metabolic panel   3  Gastroesophageal reflux disease with esophagitis     4  Hypothyroidism, unspecified type  TSH, 3rd generation with Free T4 reflex     BMI Counseling: Body mass index is 26 34 kg/m²  The BMI is above normal  Nutrition recommendations include encouraging healthy choices of fruits and vegetables, consuming healthier snacks, moderation in carbohydrate intake, increasing intake of lean protein, reducing intake of saturated and trans fat and reducing intake of cholesterol  Patient Instructions     There are no Patient Instructions on file for this visit  Chief Complaint     Chief Complaint   Patient presents with    Follow-up    Hypertension         History of Present Illness        Patient here for 4month follow up visit  RLS- taking clonopin and working well  Can tell if he forgets  HLD- taking simvastatin, tires to eat low fat diets  Hypothyroidism- denies fatigue, weight gain, cold intolerance and constipation  Has had elevated PSA, continues to follow with Dr Jaron George, has never had prostate biopsy done  Review of Systems   Review of Systems   Constitutional: Negative for activity change, appetite change, chills, fatigue and fever  HENT: Negative for congestion, ear pain, nosebleeds, rhinorrhea and sore throat  Eyes: Negative for photophobia, pain, redness and visual disturbance  Respiratory: Negative for cough, shortness of breath and wheezing  Cardiovascular: Negative  Negative for chest pain  Gastrointestinal: Negative  Negative for abdominal pain, constipation, diarrhea and vomiting  Endocrine: Negative      Genitourinary: Negative for difficulty urinating, dysuria and flank pain  Musculoskeletal: Negative  Skin: Negative for color change and rash  Neurological: Negative for dizziness, weakness, numbness and headaches  Hematological: Negative for adenopathy  Psychiatric/Behavioral: Negative for agitation and confusion  The patient is not nervous/anxious          PHQ-9 Depression Screening    PHQ-9:    Frequency of the following problems over the past two weeks:       Little interest or pleasure in doing things:  0 - not at all  Feeling down, depressed, or hopeless:  0 - not at all  PHQ-2 Score:  0        Current Medications       Current Outpatient Medications:     aspirin 81 MG tablet, Take 81 mg by mouth, Disp: , Rfl:     CALCIUM/MAGNESIUM/ZINC FORMULA PO, Take 1 tablet by mouth daily, Disp: , Rfl:     clobetasol (TEMOVATE) 0 05 % ointment, Apply topically as needed, Disp: , Rfl:     clonazePAM (KlonoPIN) 0 5 mg tablet, Take 1 tablet (0 5 mg total) by mouth daily at bedtime, Disp: 30 tablet, Rfl: 3    docusate sodium (COLACE) 100 mg capsule, Take 1 capsule (100 mg total) by mouth daily, Disp: 30 capsule, Rfl: 2    GARLIC PO, Take by mouth daily, Disp: , Rfl:     levothyroxine 75 mcg tablet, Take 1 tablet (75 mcg total) by mouth daily, Disp: 30 tablet, Rfl: 3    Multiple Vitamins-Minerals (MULTIVITAMIN PO), Take by mouth daily, Disp: , Rfl:     Omega-3 Fatty Acids (FISH OIL PO), Take by mouth daily, Disp: , Rfl:     omeprazole (PriLOSEC) 20 mg delayed release capsule, Take 20 mg by mouth, Disp: , Rfl:     simvastatin (ZOCOR) 40 mg tablet, Take 1 tablet (40 mg total) by mouth daily at bedtime, Disp: 30 tablet, Rfl: 3    tamsulosin (FLOMAX) 0 4 mg, , Disp: , Rfl:     Current Allergies     Allergies as of 01/29/2020 - Reviewed 01/29/2020   Allergen Reaction Noted    Codeine  08/07/2018            The following portions of the patient's history were reviewed and updated as appropriate: allergies, current medications, past family history, past medical history, past social history, past surgical history and problem list      Past Medical History:   Diagnosis Date    Benign neoplasm of colon     Chronic rhinitis     Colon polyp     Diverticular disease of colon     Dysphagia     GERD (gastroesophageal reflux disease)     Hyperlipidemia     Internal hemorrhoids without complication     PSA (psoriatic arthritis) (HCC)     elevated    Restless leg syndrome     Thyroid nodule        Past Surgical History:   Procedure Laterality Date    COLONOSCOPY  09/22/2010    COLONOSCOPY  06/13/2007    COLONOSCOPY  05/27/2004    COLONOSCOPY  12/09/2015    GALLBLADDER SURGERY      OTHER SURGICAL HISTORY  11/2012    abcessed tooth    THYROIDECTOMY      TONSILLECTOMY      UPPER GASTROINTESTINAL ENDOSCOPY  10/30/2013       Family History   Problem Relation Age of Onset    Colon cancer Father     Other Father         metastatic: lymph tissue, breast, thyroid, prostate, liver, lung    Other Family         malignant neoplasm of gastrointestinal disease         Medications have been verified  Objective   /66   Pulse 78   Temp 98 1 °F (36 7 °C) (Tympanic)   Resp 16   Ht 5' 2" (1 575 m)   Wt 65 3 kg (144 lb)   SpO2 98%   BMI 26 34 kg/m²        Physical Exam     Physical Exam   Constitutional: He is oriented to person, place, and time  He appears well-developed and well-nourished  He is cooperative  He does not appear ill  No distress  Eyes: Lids are normal    Cardiovascular: Normal rate, regular rhythm, S1 normal, S2 normal, normal heart sounds and intact distal pulses  Exam reveals no gallop and no friction rub  No murmur heard  Pulmonary/Chest: Effort normal and breath sounds normal  No respiratory distress  He has no decreased breath sounds  He has no wheezes  Abdominal: Normal appearance  Musculoskeletal: Normal range of motion  He exhibits no edema, tenderness or deformity     Neurological: He is alert and oriented to person, place, and time  Skin: Skin is warm  No rash noted  No erythema  Psychiatric: He has a normal mood and affect  His behavior is normal  Thought content normal    Nursing note and vitals reviewed

## 2020-04-15 ENCOUNTER — APPOINTMENT (OUTPATIENT)
Dept: LAB | Facility: CLINIC | Age: 70
End: 2020-04-15
Payer: MEDICARE

## 2020-04-15 DIAGNOSIS — E78.00 HYPERCHOLESTEROLEMIA: ICD-10-CM

## 2020-04-15 DIAGNOSIS — E03.9 HYPOTHYROIDISM, UNSPECIFIED TYPE: ICD-10-CM

## 2020-04-15 LAB
ALBUMIN SERPL BCP-MCNC: 3.9 G/DL (ref 3.5–5)
ALP SERPL-CCNC: 51 U/L (ref 46–116)
ALT SERPL W P-5'-P-CCNC: 26 U/L (ref 12–78)
ANION GAP SERPL CALCULATED.3IONS-SCNC: 8 MMOL/L (ref 4–13)
AST SERPL W P-5'-P-CCNC: 13 U/L (ref 5–45)
BILIRUB SERPL-MCNC: 0.61 MG/DL (ref 0.2–1)
BUN SERPL-MCNC: 13 MG/DL (ref 5–25)
CALCIUM SERPL-MCNC: 9.1 MG/DL (ref 8.3–10.1)
CHLORIDE SERPL-SCNC: 110 MMOL/L (ref 100–108)
CHOLEST SERPL-MCNC: 134 MG/DL (ref 50–200)
CO2 SERPL-SCNC: 27 MMOL/L (ref 21–32)
CREAT SERPL-MCNC: 0.91 MG/DL (ref 0.6–1.3)
GFR SERPL CREATININE-BSD FRML MDRD: 87 ML/MIN/1.73SQ M
GLUCOSE P FAST SERPL-MCNC: 88 MG/DL (ref 65–99)
HDLC SERPL-MCNC: 44 MG/DL
LDLC SERPL CALC-MCNC: 78 MG/DL (ref 0–100)
NONHDLC SERPL-MCNC: 90 MG/DL
POTASSIUM SERPL-SCNC: 4 MMOL/L (ref 3.5–5.3)
PROT SERPL-MCNC: 6.7 G/DL (ref 6.4–8.2)
SODIUM SERPL-SCNC: 145 MMOL/L (ref 136–145)
TRIGL SERPL-MCNC: 58 MG/DL
TSH SERPL DL<=0.05 MIU/L-ACNC: 2.45 UIU/ML (ref 0.36–3.74)

## 2020-04-15 PROCEDURE — 80061 LIPID PANEL: CPT

## 2020-04-15 PROCEDURE — 36415 COLL VENOUS BLD VENIPUNCTURE: CPT

## 2020-04-15 PROCEDURE — 80053 COMPREHEN METABOLIC PANEL: CPT

## 2020-04-15 PROCEDURE — 84443 ASSAY THYROID STIM HORMONE: CPT

## 2020-04-29 DIAGNOSIS — E03.9 HYPOTHYROIDISM, UNSPECIFIED TYPE: ICD-10-CM

## 2020-04-29 DIAGNOSIS — E78.5 HYPERLIPIDEMIA, UNSPECIFIED HYPERLIPIDEMIA TYPE: ICD-10-CM

## 2020-04-29 DIAGNOSIS — F41.9 ANXIETY: ICD-10-CM

## 2020-04-30 RX ORDER — LEVOTHYROXINE SODIUM 0.07 MG/1
TABLET ORAL
Qty: 30 TABLET | Refills: 0 | Status: SHIPPED | OUTPATIENT
Start: 2020-04-30 | End: 2020-05-13 | Stop reason: SDUPTHER

## 2020-04-30 RX ORDER — CLONAZEPAM 0.5 MG/1
TABLET ORAL
Qty: 30 TABLET | Refills: 0 | Status: SHIPPED | OUTPATIENT
Start: 2020-04-30 | End: 2020-05-13 | Stop reason: SDUPTHER

## 2020-04-30 RX ORDER — DOCUSATE SODIUM 100 MG
CAPSULE ORAL
Qty: 30 CAPSULE | Refills: 0 | Status: SHIPPED | OUTPATIENT
Start: 2020-04-30 | End: 2020-05-13 | Stop reason: SDUPTHER

## 2020-04-30 RX ORDER — SIMVASTATIN 40 MG
TABLET ORAL
Qty: 30 TABLET | Refills: 0 | Status: SHIPPED | OUTPATIENT
Start: 2020-04-30 | End: 2020-05-13 | Stop reason: SDUPTHER

## 2020-05-13 ENCOUNTER — OFFICE VISIT (OUTPATIENT)
Dept: FAMILY MEDICINE CLINIC | Facility: CLINIC | Age: 70
End: 2020-05-13
Payer: MEDICARE

## 2020-05-13 VITALS
HEIGHT: 62 IN | OXYGEN SATURATION: 100 % | SYSTOLIC BLOOD PRESSURE: 110 MMHG | BODY MASS INDEX: 25.58 KG/M2 | RESPIRATION RATE: 18 BRPM | HEART RATE: 76 BPM | TEMPERATURE: 97.1 F | WEIGHT: 139 LBS | DIASTOLIC BLOOD PRESSURE: 74 MMHG

## 2020-05-13 DIAGNOSIS — K59.00 CONSTIPATION, UNSPECIFIED CONSTIPATION TYPE: ICD-10-CM

## 2020-05-13 DIAGNOSIS — E78.5 HYPERLIPIDEMIA, UNSPECIFIED HYPERLIPIDEMIA TYPE: Primary | ICD-10-CM

## 2020-05-13 DIAGNOSIS — N40.0 BENIGN PROSTATIC HYPERPLASIA WITHOUT LOWER URINARY TRACT SYMPTOMS: ICD-10-CM

## 2020-05-13 DIAGNOSIS — Z23 NEED FOR VACCINATION: ICD-10-CM

## 2020-05-13 DIAGNOSIS — F41.9 ANXIETY: ICD-10-CM

## 2020-05-13 DIAGNOSIS — E03.9 HYPOTHYROIDISM, UNSPECIFIED TYPE: ICD-10-CM

## 2020-05-13 DIAGNOSIS — E78.00 HYPERCHOLESTEROLEMIA: ICD-10-CM

## 2020-05-13 PROBLEM — E03.8 OTHER SPECIFIED HYPOTHYROIDISM: Status: ACTIVE | Noted: 2020-05-13

## 2020-05-13 PROBLEM — R73.9 ELEVATED BLOOD SUGAR: Status: RESOLVED | Noted: 2019-06-19 | Resolved: 2020-05-13

## 2020-05-13 PROBLEM — R42 VERTIGO: Status: ACTIVE | Noted: 2020-05-13

## 2020-05-13 PROBLEM — G25.81 RESTLESS LEGS SYNDROME: Status: ACTIVE | Noted: 2020-05-13

## 2020-05-13 PROCEDURE — 1036F TOBACCO NON-USER: CPT | Performed by: NURSE PRACTITIONER

## 2020-05-13 PROCEDURE — 3008F BODY MASS INDEX DOCD: CPT | Performed by: NURSE PRACTITIONER

## 2020-05-13 PROCEDURE — 1160F RVW MEDS BY RX/DR IN RCRD: CPT | Performed by: NURSE PRACTITIONER

## 2020-05-13 PROCEDURE — G0009 ADMIN PNEUMOCOCCAL VACCINE: HCPCS

## 2020-05-13 PROCEDURE — 99214 OFFICE O/P EST MOD 30 MIN: CPT | Performed by: NURSE PRACTITIONER

## 2020-05-13 PROCEDURE — 4040F PNEUMOC VAC/ADMIN/RCVD: CPT | Performed by: NURSE PRACTITIONER

## 2020-05-13 PROCEDURE — 90732 PPSV23 VACC 2 YRS+ SUBQ/IM: CPT

## 2020-05-13 RX ORDER — DOCUSATE SODIUM 100 MG/1
100 CAPSULE, LIQUID FILLED ORAL DAILY
Qty: 90 CAPSULE | Refills: 1 | Status: SHIPPED | OUTPATIENT
Start: 2020-05-13 | End: 2020-11-27 | Stop reason: SDUPTHER

## 2020-05-13 RX ORDER — LEVOTHYROXINE SODIUM 0.07 MG/1
75 TABLET ORAL DAILY
Qty: 90 TABLET | Refills: 1 | Status: SHIPPED | OUTPATIENT
Start: 2020-05-13 | End: 2020-11-27 | Stop reason: SDUPTHER

## 2020-05-13 RX ORDER — SIMVASTATIN 40 MG
40 TABLET ORAL
Qty: 90 TABLET | Refills: 1 | Status: SHIPPED | OUTPATIENT
Start: 2020-05-13 | End: 2020-11-27 | Stop reason: SDUPTHER

## 2020-05-13 RX ORDER — TAMSULOSIN HYDROCHLORIDE 0.4 MG/1
0.4 CAPSULE ORAL
Qty: 90 CAPSULE | Refills: 1 | Status: SHIPPED | OUTPATIENT
Start: 2020-05-13 | End: 2020-11-27 | Stop reason: SDUPTHER

## 2020-05-13 RX ORDER — CLONAZEPAM 0.5 MG/1
0.5 TABLET ORAL
Qty: 30 TABLET | Refills: 1 | Status: SHIPPED | OUTPATIENT
Start: 2020-05-29 | End: 2020-07-31 | Stop reason: SDUPTHER

## 2020-07-31 DIAGNOSIS — F41.9 ANXIETY: ICD-10-CM

## 2020-07-31 RX ORDER — CLONAZEPAM 0.5 MG/1
0.5 TABLET ORAL
Qty: 30 TABLET | Refills: 1 | Status: SHIPPED | OUTPATIENT
Start: 2020-07-31 | End: 2020-10-02 | Stop reason: SDUPTHER

## 2020-09-24 ENCOUNTER — CLINICAL SUPPORT (OUTPATIENT)
Dept: FAMILY MEDICINE CLINIC | Facility: CLINIC | Age: 70
End: 2020-09-24
Payer: MEDICARE

## 2020-09-24 DIAGNOSIS — Z23 NEED FOR VACCINATION: Primary | ICD-10-CM

## 2020-09-24 PROCEDURE — 90662 IIV NO PRSV INCREASED AG IM: CPT

## 2020-09-24 PROCEDURE — G0008 ADMIN INFLUENZA VIRUS VAC: HCPCS

## 2020-10-02 DIAGNOSIS — F41.9 ANXIETY: ICD-10-CM

## 2020-10-02 RX ORDER — CLONAZEPAM 0.5 MG/1
0.5 TABLET ORAL
Qty: 30 TABLET | Refills: 1 | Status: SHIPPED | OUTPATIENT
Start: 2020-10-02 | End: 2020-11-27 | Stop reason: SDUPTHER

## 2020-11-25 ENCOUNTER — TELEPHONE (OUTPATIENT)
Dept: FAMILY MEDICINE CLINIC | Facility: CLINIC | Age: 70
End: 2020-11-25

## 2020-11-27 ENCOUNTER — CONSULT (OUTPATIENT)
Dept: FAMILY MEDICINE CLINIC | Facility: CLINIC | Age: 70
End: 2020-11-27
Payer: MEDICARE

## 2020-11-27 VITALS
DIASTOLIC BLOOD PRESSURE: 70 MMHG | BODY MASS INDEX: 25.95 KG/M2 | OXYGEN SATURATION: 100 % | HEIGHT: 62 IN | HEART RATE: 77 BPM | TEMPERATURE: 97 F | SYSTOLIC BLOOD PRESSURE: 108 MMHG | RESPIRATION RATE: 16 BRPM | WEIGHT: 141 LBS

## 2020-11-27 DIAGNOSIS — E78.5 HYPERLIPIDEMIA, UNSPECIFIED HYPERLIPIDEMIA TYPE: ICD-10-CM

## 2020-11-27 DIAGNOSIS — H26.9 CATARACT OF RIGHT EYE, UNSPECIFIED CATARACT TYPE: ICD-10-CM

## 2020-11-27 DIAGNOSIS — N40.0 BENIGN PROSTATIC HYPERPLASIA WITHOUT LOWER URINARY TRACT SYMPTOMS: ICD-10-CM

## 2020-11-27 DIAGNOSIS — Z00.00 ENCOUNTER FOR MEDICARE ANNUAL WELLNESS EXAM: ICD-10-CM

## 2020-11-27 DIAGNOSIS — F41.9 ANXIETY: ICD-10-CM

## 2020-11-27 DIAGNOSIS — Z01.818 PRE-OP EXAMINATION: Primary | ICD-10-CM

## 2020-11-27 DIAGNOSIS — E03.9 HYPOTHYROIDISM, UNSPECIFIED TYPE: ICD-10-CM

## 2020-11-27 DIAGNOSIS — K59.00 CONSTIPATION, UNSPECIFIED CONSTIPATION TYPE: ICD-10-CM

## 2020-11-27 PROCEDURE — G0439 PPPS, SUBSEQ VISIT: HCPCS | Performed by: NURSE PRACTITIONER

## 2020-11-27 PROCEDURE — 99214 OFFICE O/P EST MOD 30 MIN: CPT | Performed by: NURSE PRACTITIONER

## 2020-11-27 RX ORDER — SIMVASTATIN 40 MG
40 TABLET ORAL
Qty: 90 TABLET | Refills: 1 | Status: SHIPPED | OUTPATIENT
Start: 2020-11-27 | End: 2021-03-03 | Stop reason: SDUPTHER

## 2020-11-27 RX ORDER — TAMSULOSIN HYDROCHLORIDE 0.4 MG/1
0.4 CAPSULE ORAL
Qty: 90 CAPSULE | Refills: 1 | Status: SHIPPED | OUTPATIENT
Start: 2020-11-27 | End: 2021-03-03 | Stop reason: SDUPTHER

## 2020-11-27 RX ORDER — LEVOTHYROXINE SODIUM 0.07 MG/1
75 TABLET ORAL DAILY
Qty: 90 TABLET | Refills: 1 | Status: SHIPPED | OUTPATIENT
Start: 2020-11-27 | End: 2021-03-03 | Stop reason: SDUPTHER

## 2020-11-27 RX ORDER — CLONAZEPAM 0.5 MG/1
0.5 TABLET ORAL
Qty: 30 TABLET | Refills: 1 | Status: SHIPPED | OUTPATIENT
Start: 2020-11-27 | End: 2021-01-28 | Stop reason: SDUPTHER

## 2020-11-27 RX ORDER — POLYMYXIN B SULFATE AND TRIMETHOPRIM 1; 10000 MG/ML; [USP'U]/ML
SOLUTION OPHTHALMIC
COMMUNITY
Start: 2020-11-13 | End: 2021-09-08

## 2020-11-27 RX ORDER — PREDNISOLONE ACETATE 10 MG/ML
SUSPENSION/ DROPS OPHTHALMIC
COMMUNITY
Start: 2020-11-13 | End: 2021-09-08

## 2020-11-27 RX ORDER — DOCUSATE SODIUM 100 MG/1
100 CAPSULE, LIQUID FILLED ORAL DAILY
Qty: 90 CAPSULE | Refills: 1 | Status: SHIPPED | OUTPATIENT
Start: 2020-11-27 | End: 2021-05-26 | Stop reason: SDUPTHER

## 2020-12-07 ENCOUNTER — TRANSCRIBE ORDERS (OUTPATIENT)
Dept: URGENT CARE | Facility: CLINIC | Age: 70
End: 2020-12-07

## 2020-12-07 ENCOUNTER — APPOINTMENT (OUTPATIENT)
Dept: LAB | Facility: CLINIC | Age: 70
End: 2020-12-07
Payer: MEDICARE

## 2020-12-07 DIAGNOSIS — R97.20 ELEVATED PROSTATE SPECIFIC ANTIGEN (PSA): Primary | ICD-10-CM

## 2020-12-07 DIAGNOSIS — E03.9 HYPOTHYROIDISM, UNSPECIFIED TYPE: ICD-10-CM

## 2020-12-07 DIAGNOSIS — R97.20 ELEVATED PROSTATE SPECIFIC ANTIGEN (PSA): ICD-10-CM

## 2020-12-07 DIAGNOSIS — E78.00 HYPERCHOLESTEROLEMIA: ICD-10-CM

## 2020-12-07 PROCEDURE — 36415 COLL VENOUS BLD VENIPUNCTURE: CPT

## 2020-12-07 PROCEDURE — 84153 ASSAY OF PSA TOTAL: CPT

## 2020-12-07 PROCEDURE — 84154 ASSAY OF PSA FREE: CPT

## 2020-12-08 LAB
PSA FREE MFR SERPL: 36.7 %
PSA FREE SERPL-MCNC: 1.58 NG/ML
PSA SERPL-MCNC: 4.3 NG/ML (ref 0–4)

## 2021-01-28 DIAGNOSIS — F41.9 ANXIETY: ICD-10-CM

## 2021-01-28 RX ORDER — CLONAZEPAM 0.5 MG/1
0.5 TABLET ORAL
Qty: 30 TABLET | Refills: 1 | Status: SHIPPED | OUTPATIENT
Start: 2021-01-28 | End: 2021-03-29

## 2021-03-03 ENCOUNTER — OFFICE VISIT (OUTPATIENT)
Dept: FAMILY MEDICINE CLINIC | Facility: CLINIC | Age: 71
End: 2021-03-03
Payer: MEDICARE

## 2021-03-03 VITALS
BODY MASS INDEX: 26.5 KG/M2 | RESPIRATION RATE: 20 BRPM | HEART RATE: 81 BPM | OXYGEN SATURATION: 99 % | SYSTOLIC BLOOD PRESSURE: 120 MMHG | DIASTOLIC BLOOD PRESSURE: 74 MMHG | TEMPERATURE: 98.6 F | HEIGHT: 62 IN | WEIGHT: 144 LBS

## 2021-03-03 DIAGNOSIS — G25.81 RESTLESS LEGS SYNDROME: ICD-10-CM

## 2021-03-03 DIAGNOSIS — E03.8 OTHER SPECIFIED HYPOTHYROIDISM: Primary | ICD-10-CM

## 2021-03-03 DIAGNOSIS — E03.9 HYPOTHYROIDISM, UNSPECIFIED TYPE: ICD-10-CM

## 2021-03-03 DIAGNOSIS — N40.0 BENIGN PROSTATIC HYPERPLASIA WITHOUT LOWER URINARY TRACT SYMPTOMS: ICD-10-CM

## 2021-03-03 DIAGNOSIS — E78.5 HYPERLIPIDEMIA, UNSPECIFIED HYPERLIPIDEMIA TYPE: ICD-10-CM

## 2021-03-03 PROCEDURE — 99214 OFFICE O/P EST MOD 30 MIN: CPT | Performed by: NURSE PRACTITIONER

## 2021-03-03 RX ORDER — LEVOTHYROXINE SODIUM 0.07 MG/1
75 TABLET ORAL DAILY
Qty: 90 TABLET | Refills: 1 | Status: SHIPPED | OUTPATIENT
Start: 2021-03-03 | End: 2021-05-26 | Stop reason: SDUPTHER

## 2021-03-03 RX ORDER — TAMSULOSIN HYDROCHLORIDE 0.4 MG/1
0.4 CAPSULE ORAL
Qty: 90 CAPSULE | Refills: 1 | Status: SHIPPED | OUTPATIENT
Start: 2021-03-03 | End: 2021-05-26 | Stop reason: SDUPTHER

## 2021-03-03 RX ORDER — SIMVASTATIN 40 MG
40 TABLET ORAL
Qty: 90 TABLET | Refills: 1 | Status: SHIPPED | OUTPATIENT
Start: 2021-03-03 | End: 2021-05-26 | Stop reason: SDUPTHER

## 2021-03-03 NOTE — PATIENT INSTRUCTIONS
Follow up in 6 months  Get labs done in the next few weeks  Will enter labs as needed before next visit

## 2021-03-03 NOTE — PROGRESS NOTES
[facility]        NAME: Danilo Parra is a 78 y o  male  : 1950    MRN: [id number]  DATE: March 3, 2021  TIME: 9:29 AM    Assessment and Plan   Other specified hypothyroidism [E03 8]  1  Other specified hypothyroidism     2  Benign prostatic hyperplasia without lower urinary tract symptoms  tamsulosin (FLOMAX) 0 4 mg   3  Hyperlipidemia, unspecified hyperlipidemia type  simvastatin (ZOCOR) 40 mg tablet   4  Hypothyroidism, unspecified type  levothyroxine 75 mcg tablet   5  Restless legs syndrome       1  Benign prostatic hyperplasia without lower urinary tract symptoms  Continue flomax daily and follow up with Dr Merl[facility] as scheduled  Last PSA was 4 3  - tamsulosin (FLOMAX) 0 4 mg; Take 1 capsule (0 4 mg total) by mouth daily with dinner  Dispense: 90 capsule; Refill: 1    2  Hyperlipidemia, unspecified hyperlipidemia type  Well controlled on Zocor  Will recheck last lipid panel was 2020   - simvastatin (ZOCOR) 40 mg tablet; Take 1 tablet (40 mg total) by mouth daily at bedtime  Dispense: 90 tablet; Refill: 1    3  Hypothyroidism, unspecified type  Denies any symptoms  Has been well controlled on levothyroxine 75mcg    - levothyroxine 75 mcg tablet; Take 1 tablet (75 mcg total) by mouth daily  Dispense: 90 tablet; Refill: 1      5  Restless legs syndrome  Take 0 5mg klonopin at bedtime for years which has helped to controlled his restless leg syndrome  Patient has been compliant with medication  I am going to extend his refills to 90 day supply  BMI Counseling: Body mass index is 26 34 kg/m²  The BMI is above normal  Nutrition recommendations include encouraging healthy choices of fruits and vegetables, limiting drinks that contain sugar, moderation in carbohydrate intake, increasing intake of lean protein, reducing intake of saturated and trans fat and reducing intake of cholesterol  Patient Instructions     Patient Instructions   Follow up in 6 months  Get labs done in the next few weeks  Will enter labs as needed before next visit  Chief Complaint     Chief Complaint   Patient presents with    Follow-up         History of Present Illness       Patient here for 6 month follow up visit  Lab were not completed before visit  Cataract surgery went well  Vision has improved  Asking about the COVID vaccine, would like to be put on our list as they are having trouble when calling the number  Restless leg- has been doing well with klonopin nightly, reports if he runs out of medication his wife notices the restless leg and keeps both of them up at night  Discussed that we can start doing 90 day refills of this medication as he is compliant and never takes it more often than prescribed  Hypothyroidism-TSH has not been check in the last year  Last was 4/2020  GERD-well controlled on omeprazole OTC    HLD- needs to recheck lipid panel as it has been a year  Will have patient gets labs done in the next few weeks to assess  Reports watching his diet and trying to eat healthy  Review of Systems   Review of Systems   Constitutional: Negative for activity change, appetite change, chills, fatigue and fever  HENT: Negative for congestion, ear pain, nosebleeds, rhinorrhea and sore throat  Eyes: Negative for photophobia, pain, redness and visual disturbance  Respiratory: Negative for cough, shortness of breath and wheezing  Cardiovascular: Negative  Negative for chest pain  Gastrointestinal: Negative  Negative for abdominal pain, constipation, diarrhea and vomiting  Endocrine: Negative  Genitourinary: Negative for difficulty urinating, dysuria and flank pain  Musculoskeletal: Negative  Skin: Negative for color change and rash  Neurological: Negative for dizziness, weakness, numbness and headaches  Hematological: Negative for adenopathy  Psychiatric/Behavioral: Negative for agitation and confusion   The patient is not nervous/anxious          PHQ-9 Depression Screening    PHQ-9:   Frequency of the following problems over the past two weeks:      Little interest or pleasure in doing things: 0 - not at all  Feeling down, depressed, or hopeless: 0 - not at all  PHQ-2 Score: 0        Current Medications       Current Outpatient Medications:     aspirin 81 MG tablet, Take 81 mg by mouth, Disp: , Rfl:     CALCIUM/MAGNESIUM/ZINC FORMULA PO, Take 1 tablet by mouth daily, Disp: , Rfl:     clobetasol (TEMOVATE) 0 05 % ointment, Apply topically as needed, Disp: , Rfl:     clonazePAM (KlonoPIN) 0 5 mg tablet, Take 1 tablet (0 5 mg total) by mouth daily at bedtime, Disp: 30 tablet, Rfl: 1    docusate sodium (QC Stool Softener) 100 mg capsule, Take 1 capsule (100 mg total) by mouth daily, Disp: 90 capsule, Rfl: 1    GARLIC PO, Take by mouth daily, Disp: , Rfl:     levothyroxine 75 mcg tablet, Take 1 tablet (75 mcg total) by mouth daily, Disp: 90 tablet, Rfl: 1    Multiple Vitamins-Minerals (MULTIVITAMIN PO), Take by mouth daily, Disp: , Rfl:     Omega-3 Fatty Acids (FISH OIL PO), Take by mouth daily, Disp: , Rfl:     omeprazole (PriLOSEC) 20 mg delayed release capsule, Take 20 mg by mouth, Disp: , Rfl:     simvastatin (ZOCOR) 40 mg tablet, Take 1 tablet (40 mg total) by mouth daily at bedtime, Disp: 90 tablet, Rfl: 1    tamsulosin (FLOMAX) 0 4 mg, Take 1 capsule (0 4 mg total) by mouth daily with dinner, Disp: 90 capsule, Rfl: 1    polymyxin b-trimethoprim (POLYTRIM) ophthalmic solution, , Disp: , Rfl:     prednisoLONE acetate (PRED FORTE) 1 % ophthalmic suspension, , Disp: , Rfl:     Current Allergies     Allergies as of 03/03/2021 - Reviewed 03/03/2021   Allergen Reaction Noted    Codeine  08/07/2018            The following portions of the patient's history were reviewed and updated as appropriate: allergies, current medications, past family history, past medical history, past social history, past surgical history and problem list      Past Medical History:   Diagnosis Date    Benign neoplasm of colon     Chronic rhinitis     Colon polyp     Diverticular disease of colon     Dysphagia     GERD (gastroesophageal reflux disease)     Hyperlipidemia     Internal hemorrhoids without complication     PSA (psoriatic arthritis) (HCC)     elevated    Restless leg syndrome     Thyroid nodule        Past Surgical History:   Procedure Laterality Date    COLONOSCOPY  09/22/2010    COLONOSCOPY  06/13/2007    COLONOSCOPY  05/27/2004    COLONOSCOPY  12/09/2015    GALLBLADDER SURGERY      OTHER SURGICAL HISTORY  11/2012    abcessed tooth    THYROIDECTOMY      TONSILLECTOMY      UPPER GASTROINTESTINAL ENDOSCOPY  10/30/2013       Family History   Problem Relation Age of Onset    Colon cancer Father     Other Father         metastatic: lymph tissue, breast, thyroid, prostate, liver, lung    Other Family         malignant neoplasm of gastrointestinal disease         Medications have been verified  Objective   /74   Pulse 81   Temp 98 6 °F (37 °C)   Resp 20   Ht 5' 2" (1 575 m)   Wt 65 3 kg (144 lb)   SpO2 99%   BMI 26 34 kg/m²        Physical Exam     Physical Exam  Vitals signs and nursing note reviewed  Constitutional:       General: He is not in acute distress  Appearance: Normal appearance  He is well-developed  He is not ill-appearing  Eyes:      General: Lids are normal    Cardiovascular:      Rate and Rhythm: Normal rate and regular rhythm  Heart sounds: Normal heart sounds, S1 normal and S2 normal  No murmur  No friction rub  No gallop  Pulmonary:      Effort: Pulmonary effort is normal  No respiratory distress  Breath sounds: Normal breath sounds  No decreased breath sounds or wheezing  Musculoskeletal: Normal range of motion  General: No tenderness or deformity  Skin:     General: Skin is warm  Findings: No erythema or rash     Neurological:      Mental Status: He is alert and oriented to person, place, and time  Psychiatric:         Behavior: Behavior normal  Behavior is cooperative  Thought Content:  Thought content normal

## 2021-03-04 ENCOUNTER — LAB (OUTPATIENT)
Dept: LAB | Facility: CLINIC | Age: 71
End: 2021-03-04
Payer: MEDICARE

## 2021-03-04 LAB
ALBUMIN SERPL BCP-MCNC: 4.1 G/DL (ref 3.5–5)
ALP SERPL-CCNC: 49 U/L (ref 46–116)
ALT SERPL W P-5'-P-CCNC: 23 U/L (ref 12–78)
ANION GAP SERPL CALCULATED.3IONS-SCNC: 4 MMOL/L (ref 4–13)
AST SERPL W P-5'-P-CCNC: 16 U/L (ref 5–45)
BILIRUB SERPL-MCNC: 0.77 MG/DL (ref 0.2–1)
BUN SERPL-MCNC: 17 MG/DL (ref 5–25)
CALCIUM SERPL-MCNC: 9.5 MG/DL (ref 8.3–10.1)
CHLORIDE SERPL-SCNC: 111 MMOL/L (ref 100–108)
CHOLEST SERPL-MCNC: 142 MG/DL (ref 50–200)
CO2 SERPL-SCNC: 28 MMOL/L (ref 21–32)
CREAT SERPL-MCNC: 0.9 MG/DL (ref 0.6–1.3)
GFR SERPL CREATININE-BSD FRML MDRD: 87 ML/MIN/1.73SQ M
GLUCOSE P FAST SERPL-MCNC: 97 MG/DL (ref 65–99)
HDLC SERPL-MCNC: 47 MG/DL
LDLC SERPL CALC-MCNC: 79 MG/DL (ref 0–100)
NONHDLC SERPL-MCNC: 95 MG/DL
POTASSIUM SERPL-SCNC: 4.2 MMOL/L (ref 3.5–5.3)
PROT SERPL-MCNC: 6.9 G/DL (ref 6.4–8.2)
SODIUM SERPL-SCNC: 143 MMOL/L (ref 136–145)
TRIGL SERPL-MCNC: 78 MG/DL
TSH SERPL DL<=0.05 MIU/L-ACNC: 2.31 UIU/ML (ref 0.36–3.74)

## 2021-03-04 PROCEDURE — 36415 COLL VENOUS BLD VENIPUNCTURE: CPT

## 2021-03-04 PROCEDURE — 84443 ASSAY THYROID STIM HORMONE: CPT

## 2021-03-04 PROCEDURE — 80061 LIPID PANEL: CPT

## 2021-03-04 PROCEDURE — 80053 COMPREHEN METABOLIC PANEL: CPT

## 2021-03-29 DIAGNOSIS — F41.9 ANXIETY: ICD-10-CM

## 2021-03-29 RX ORDER — CLONAZEPAM 0.5 MG/1
TABLET ORAL
Qty: 30 TABLET | Refills: 0 | Status: SHIPPED | OUTPATIENT
Start: 2021-03-29 | End: 2021-04-29 | Stop reason: SDUPTHER

## 2021-04-29 DIAGNOSIS — F41.9 ANXIETY: ICD-10-CM

## 2021-04-29 RX ORDER — CLONAZEPAM 0.5 MG/1
0.5 TABLET ORAL
Qty: 30 TABLET | Refills: 0 | Status: SHIPPED | OUTPATIENT
Start: 2021-04-29 | End: 2021-05-26 | Stop reason: SDUPTHER

## 2021-04-29 NOTE — TELEPHONE ENCOUNTER
Patient requesting refill(s) of:  Clonazepam (Klonopin) 0 5mg     Last filled:3/29/2021  Last appt:3/3/2021  Next appt:9/8/2021  Pharmacy:[facility], [place], [place]

## 2021-05-26 DIAGNOSIS — K59.00 CONSTIPATION, UNSPECIFIED CONSTIPATION TYPE: ICD-10-CM

## 2021-05-26 DIAGNOSIS — E78.5 HYPERLIPIDEMIA, UNSPECIFIED HYPERLIPIDEMIA TYPE: ICD-10-CM

## 2021-05-26 DIAGNOSIS — E03.9 HYPOTHYROIDISM, UNSPECIFIED TYPE: ICD-10-CM

## 2021-05-26 DIAGNOSIS — F41.9 ANXIETY: ICD-10-CM

## 2021-05-26 DIAGNOSIS — N40.0 BENIGN PROSTATIC HYPERPLASIA WITHOUT LOWER URINARY TRACT SYMPTOMS: ICD-10-CM

## 2021-05-26 RX ORDER — TAMSULOSIN HYDROCHLORIDE 0.4 MG/1
0.4 CAPSULE ORAL
Qty: 90 CAPSULE | Refills: 1 | Status: SHIPPED | OUTPATIENT
Start: 2021-05-26

## 2021-05-26 RX ORDER — CLONAZEPAM 0.5 MG/1
0.5 TABLET ORAL
Qty: 30 TABLET | Refills: 2 | Status: SHIPPED | OUTPATIENT
Start: 2021-05-26 | End: 2021-08-26 | Stop reason: SDUPTHER

## 2021-05-26 RX ORDER — SIMVASTATIN 40 MG
40 TABLET ORAL
Qty: 90 TABLET | Refills: 1 | Status: SHIPPED | OUTPATIENT
Start: 2021-05-26 | End: 2021-11-23 | Stop reason: SDUPTHER

## 2021-05-26 RX ORDER — LEVOTHYROXINE SODIUM 0.07 MG/1
75 TABLET ORAL DAILY
Qty: 90 TABLET | Refills: 1 | Status: SHIPPED | OUTPATIENT
Start: 2021-05-26 | End: 2021-11-23 | Stop reason: SDUPTHER

## 2021-05-26 RX ORDER — DOCUSATE SODIUM 100 MG/1
100 CAPSULE, LIQUID FILLED ORAL DAILY
Qty: 90 CAPSULE | Refills: 1 | Status: SHIPPED | OUTPATIENT
Start: 2021-05-26 | End: 2021-11-23 | Stop reason: SDUPTHER

## 2021-05-26 NOTE — TELEPHONE ENCOUNTER
Patient requesting refill(s) of: klonopin 0 5 mg daily    Last filled:4/29/2021 #30 x 0  Last appt:3/3/21  Next appt:9/8/21  Pharmacy: [facility]    Patient requesting refill(s) of: levothyroxine 75 mcg daily    Last filled: 3/3/2021 #90 x 1  Pharmacy: [facility]    Patient requesting refill(s) of: simvastatin 40 mg daily    Last filled:3/3/21 #90 x 1  Pharmacy: [facility]    Patient requesting refill(s) of: tamsulosin 0 4 mg daily    Last filled: 3/3/21 #90 x 1  Pharmacy: [facility]    Patient requesting refill(s) of: Stool softener 100 mg daily    Last filled: 11/27/2020 #90 x 1  Pharmacy: [facility]

## 2021-06-16 ENCOUNTER — LAB REQUISITION (OUTPATIENT)
Dept: LAB | Facility: HOSPITAL | Age: 71
End: 2021-06-16
Payer: MEDICARE

## 2021-06-16 DIAGNOSIS — R31.29 OTHER MICROSCOPIC HEMATURIA: ICD-10-CM

## 2021-06-16 LAB
BILIRUB UR QL STRIP: NEGATIVE
CLARITY UR: CLEAR
COLOR UR: YELLOW
GLUCOSE UR STRIP-MCNC: NEGATIVE MG/DL
HGB UR QL STRIP.AUTO: NEGATIVE
KETONES UR STRIP-MCNC: NEGATIVE MG/DL
LEUKOCYTE ESTERASE UR QL STRIP: NEGATIVE
NITRITE UR QL STRIP: NEGATIVE
PH UR STRIP.AUTO: 7 [PH]
PROT UR STRIP-MCNC: NEGATIVE MG/DL
SP GR UR STRIP.AUTO: 1.01 (ref 1–1.03)
UROBILINOGEN UR QL STRIP.AUTO: 0.2 E.U./DL

## 2021-06-16 PROCEDURE — 81003 URINALYSIS AUTO W/O SCOPE: CPT | Performed by: UROLOGY

## 2021-08-26 DIAGNOSIS — F41.9 ANXIETY: ICD-10-CM

## 2021-08-26 RX ORDER — CLONAZEPAM 0.5 MG/1
0.5 TABLET ORAL
Qty: 30 TABLET | Refills: 2 | Status: SHIPPED | OUTPATIENT
Start: 2021-08-26 | End: 2021-11-23 | Stop reason: SDUPTHER

## 2021-08-26 NOTE — TELEPHONE ENCOUNTER
Patient requesting refill(s) of:  Clonazepam 0 5 mg tab at     Last filled:  5/26/21  Last appt:   3/3/21  Next appt:    9/8/21  Ph[facility]:  [facility]

## 2021-09-08 ENCOUNTER — OFFICE VISIT (OUTPATIENT)
Dept: FAMILY MEDICINE CLINIC | Facility: CLINIC | Age: 71
End: 2021-09-08
Payer: MEDICARE

## 2021-09-08 VITALS
WEIGHT: 138.8 LBS | HEIGHT: 62 IN | SYSTOLIC BLOOD PRESSURE: 108 MMHG | TEMPERATURE: 98.9 F | HEART RATE: 90 BPM | BODY MASS INDEX: 25.54 KG/M2 | OXYGEN SATURATION: 97 % | DIASTOLIC BLOOD PRESSURE: 64 MMHG

## 2021-09-08 DIAGNOSIS — N40.0 BENIGN PROSTATIC HYPERPLASIA WITHOUT LOWER URINARY TRACT SYMPTOMS: ICD-10-CM

## 2021-09-08 DIAGNOSIS — Z11.52 ENCOUNTER FOR SCREENING FOR COVID-19: ICD-10-CM

## 2021-09-08 DIAGNOSIS — H35.30 MACULAR DEGENERATION, UNSPECIFIED LATERALITY, UNSPECIFIED TYPE: ICD-10-CM

## 2021-09-08 DIAGNOSIS — K21.9 GASTROESOPHAGEAL REFLUX DISEASE WITHOUT ESOPHAGITIS: ICD-10-CM

## 2021-09-08 DIAGNOSIS — E78.00 HYPERCHOLESTEROLEMIA: Primary | ICD-10-CM

## 2021-09-08 DIAGNOSIS — E03.8 OTHER SPECIFIED HYPOTHYROIDISM: ICD-10-CM

## 2021-09-08 PROCEDURE — 99214 OFFICE O/P EST MOD 30 MIN: CPT | Performed by: NURSE PRACTITIONER

## 2021-09-08 NOTE — PROGRESS NOTES
[facility]        NAME: Ivory Craig is a 73 y o  male  : 1950    MRN: [id number]  DATE: 2021  TIME: 10:01 AM    Assessment and Plan   Hypercholesterolemia [E78 00]  1  Hypercholesterolemia  Lipid panel   2  Encounter for screening for COVID-19  Novel Coronavirus (COVID-19), PCR [facility] - Travel Covid   3  Macular degeneration, unspecified laterality, unspecified type     4  Benign prostatic hyperplasia without lower urinary tract symptoms     5  Gastroesophageal reflux disease without esophagitis  Comprehensive metabolic panel    CBC and differential   6  Other specified hypothyroidism  TSH, 3rd generation with Free T4 reflex   1  Encounter for screening for COVID-19  Needs testing before traveling to [facility] to see his son  - Novel Coronavirus (COVID-19), PCR [facility] - Travel Covid; Future    2  Macular degeneration, unspecified laterality, unspecified type  Stated on Luetin  3  Benign prostatic hyperplasia without lower urinary tract symptoms  Followed by Dr Aylin Javier  Increased his Flomax  4  Gastroesophageal reflux disease without esophagitis  Well controlled  - Comprehensive metabolic panel; Future  - CBC and differential; Future    5  Hypercholesterolemia  Recheck before next visit  - Lipid panel; Future    6  Other specified hypothyroidism    - TSH, 3rd generation with Free T4 reflex; Future      Patient Instructions     There are no Patient Instructions on file for this visit  Chief Complaint     Chief Complaint   Patient presents with    Follow-up     hld and hyperthyroid follow up  History of Present Illness       Patient here for routine follow up visit for HLD and hypothyroidism  Patient has no concerns today  Has been doing well  Compliant with medication  Traveling to [facility] in a few weeks and needs order for COVID test [place]       BPH- being managed by Dr Aylin Javier who recently increased his flomax to 0 9mg daily      Hypothyroidism has been well controlled with normal TSH for the last 2 years, patient denies any symptoms  Review of Systems   Review of Systems   Constitutional: Negative for activity change, appetite change, chills, fatigue and fever  HENT: Negative for congestion, ear pain, nosebleeds, rhinorrhea and sore throat  Eyes: Negative for photophobia, pain, redness and visual disturbance  Respiratory: Negative for cough, shortness of breath and wheezing  Cardiovascular: Negative  Negative for chest pain  Gastrointestinal: Negative  Negative for abdominal pain, constipation, diarrhea and vomiting  Endocrine: Negative  Genitourinary: Negative for difficulty urinating, dysuria and flank pain  Musculoskeletal: Negative  Skin: Negative for color change and rash  Neurological: Negative for dizziness, weakness, numbness and headaches  Hematological: Negative for adenopathy  Psychiatric/Behavioral: Negative for agitation and confusion  The patient is not nervous/anxious          PHQ-9 Depression Screening    PHQ-9:   Frequency of the following problems over the past two weeks:      Little interest or pleasure in doing things: 0 - not at all  Feeling down, depressed, or hopeless: 0 - not at all  PHQ-2 Score: 0        Current Medications       Current Outpatient Medications:     aspirin 81 MG tablet, Take 81 mg by mouth, Disp: , Rfl:     CALCIUM/MAGNESIUM/ZINC FORMULA PO, Take 1 tablet by mouth daily, Disp: , Rfl:     clobetasol (TEMOVATE) 0 05 % ointment, Apply topically as needed, Disp: , Rfl:     clonazePAM (KlonoPIN) 0 5 mg tablet, Take 1 tablet (0 5 mg total) by mouth daily at bedtime, Disp: 30 tablet, Rfl: 2    docusate sodium (QC Stool Softener) 100 mg capsule, Take 1 capsule (100 mg total) by mouth daily, Disp: 90 capsule, Rfl: 1    GARLIC PO, Take by mouth daily, Disp: , Rfl:     levothyroxine 75 mcg tablet, Take 1 tablet (75 mcg total) by mouth daily, Disp: 90 tablet, Rfl: 1    Multiple Vitamins-Minerals (LUTEIN-ZEAXANTHIN PO), Take 20 mg by mouth, Disp: , Rfl:     Multiple Vitamins-Minerals (MULTIVITAMIN PO), Take by mouth daily, Disp: , Rfl:     Omega-3 Fatty Acids (FISH OIL PO), Take by mouth daily, Disp: , Rfl:     omeprazole (PriLOSEC) 20 mg delayed release capsule, Take 20 mg by mouth, Disp: , Rfl:     simvastatin (ZOCOR) 40 mg tablet, Take 1 tablet (40 mg total) by mouth daily at bedtime, Disp: 90 tablet, Rfl: 1    tamsulosin (FLOMAX) 0 4 mg, Take 1 capsule (0 4 mg total) by mouth daily with dinner (Patient taking differently: Take 0 8 mg by mouth daily with dinner ), Disp: 90 capsule, Rfl: 1    Current Allergies     Allergies as of 09/08/2021 - Reviewed 09/08/2021   Allergen Reaction Noted    Codeine  08/07/2018            The following portions of the patient's history were reviewed and updated as appropriate: allergies, current medications, past family history, past medical history, past social history, past surgical history and problem list      Past Medical History:   Diagnosis Date    Benign neoplasm of colon     Chronic rhinitis     Colon polyp     Diverticular disease of colon     Dysphagia     GERD (gastroesophageal reflux disease)     Hyperlipidemia     Internal hemorrhoids without complication     PSA (psoriatic arthritis) (HCC)     elevated    Restless leg syndrome     Thyroid nodule        Past Surgical History:   Procedure Laterality Date    COLONOSCOPY  09/22/2010    COLONOSCOPY  06/13/2007    COLONOSCOPY  05/27/2004    COLONOSCOPY  12/09/2015    GALLBLADDER SURGERY      OTHER SURGICAL HISTORY  11/2012    abcessed tooth    THYROIDECTOMY      TONSILLECTOMY      UPPER GASTROINTESTINAL ENDOSCOPY  10/30/2013       Family History   Problem Relation Age of Onset    Colon cancer Father     Other Father         metastatic: lymph tissue, breast, thyroid, prostate, liver, lung    Other Family         malignant neoplasm of gastrointestinal disease         Medications have been verified  Objective   /64   Pulse 90   Temp 98 9 °F (37 2 °C)   Ht 5' 2" (1 575 m)   Wt 63 kg (138 lb 12 8 oz)   SpO2 97%   BMI 25 39 kg/m²        Physical Exam     Physical Exam  Vitals and nursing note reviewed  Constitutional:       General: He is not in acute distress  Appearance: Normal appearance  He is well-developed  He is not ill-appearing  Eyes:      General: Lids are normal    Cardiovascular:      Rate and Rhythm: Normal rate and regular rhythm  Heart sounds: Normal heart sounds, S1 normal and S2 normal  No murmur heard  Pulmonary:      Effort: Pulmonary effort is normal  No respiratory distress  Breath sounds: Normal breath sounds  No decreased breath sounds or wheezing  Musculoskeletal:         General: No tenderness or deformity  Normal range of motion  Skin:     General: Skin is warm  Findings: No erythema or rash  Neurological:      Mental Status: He is alert and oriented to person, place, and time  Psychiatric:         Behavior: Behavior normal  Behavior is cooperative  Thought Content:  Thought content normal

## 2021-10-27 ENCOUNTER — IMMUNIZATIONS (OUTPATIENT)
Dept: FAMILY MEDICINE CLINIC | Facility: CLINIC | Age: 71
End: 2021-10-27
Payer: MEDICARE

## 2021-10-27 DIAGNOSIS — Z23 ENCOUNTER FOR IMMUNIZATION: Primary | ICD-10-CM

## 2021-10-27 PROCEDURE — 90662 IIV NO PRSV INCREASED AG IM: CPT

## 2021-10-27 PROCEDURE — G0008 ADMIN INFLUENZA VIRUS VAC: HCPCS

## 2021-11-19 ENCOUNTER — APPOINTMENT (OUTPATIENT)
Dept: LAB | Facility: CLINIC | Age: 71
End: 2021-11-19
Payer: MEDICARE

## 2021-11-19 DIAGNOSIS — R97.20 ELEVATED PROSTATE SPECIFIC ANTIGEN (PSA): ICD-10-CM

## 2021-11-19 DIAGNOSIS — K21.9 GASTROESOPHAGEAL REFLUX DISEASE WITHOUT ESOPHAGITIS: ICD-10-CM

## 2021-11-19 DIAGNOSIS — E03.8 OTHER SPECIFIED HYPOTHYROIDISM: ICD-10-CM

## 2021-11-19 DIAGNOSIS — E78.00 HYPERCHOLESTEROLEMIA: ICD-10-CM

## 2021-11-19 LAB
ALBUMIN SERPL BCP-MCNC: 3.8 G/DL (ref 3.5–5)
ALP SERPL-CCNC: 51 U/L (ref 46–116)
ALT SERPL W P-5'-P-CCNC: 24 U/L (ref 12–78)
ANION GAP SERPL CALCULATED.3IONS-SCNC: 7 MMOL/L (ref 4–13)
AST SERPL W P-5'-P-CCNC: 19 U/L (ref 5–45)
BASOPHILS # BLD AUTO: 0.03 THOUSANDS/ΜL (ref 0–0.1)
BASOPHILS NFR BLD AUTO: 1 % (ref 0–1)
BILIRUB SERPL-MCNC: 0.48 MG/DL (ref 0.2–1)
BUN SERPL-MCNC: 15 MG/DL (ref 5–25)
CALCIUM SERPL-MCNC: 9.3 MG/DL (ref 8.3–10.1)
CHLORIDE SERPL-SCNC: 108 MMOL/L (ref 100–108)
CHOLEST SERPL-MCNC: 122 MG/DL
CO2 SERPL-SCNC: 26 MMOL/L (ref 21–32)
CREAT SERPL-MCNC: 0.92 MG/DL (ref 0.6–1.3)
EOSINOPHIL # BLD AUTO: 0.1 THOUSAND/ΜL (ref 0–0.61)
EOSINOPHIL NFR BLD AUTO: 2 % (ref 0–6)
ERYTHROCYTE [DISTWIDTH] IN BLOOD BY AUTOMATED COUNT: 12.2 % (ref 11.6–15.1)
GFR SERPL CREATININE-BSD FRML MDRD: 85 ML/MIN/1.73SQ M
GLUCOSE P FAST SERPL-MCNC: 99 MG/DL (ref 65–99)
HCT VFR BLD AUTO: 45.9 % (ref 36.5–49.3)
HDLC SERPL-MCNC: 43 MG/DL
HGB BLD-MCNC: 15.4 G/DL (ref 12–17)
IMM GRANULOCYTES # BLD AUTO: 0.02 THOUSAND/UL (ref 0–0.2)
IMM GRANULOCYTES NFR BLD AUTO: 0 % (ref 0–2)
LDLC SERPL CALC-MCNC: 66 MG/DL (ref 0–100)
LYMPHOCYTES # BLD AUTO: 1.42 THOUSANDS/ΜL (ref 0.6–4.47)
LYMPHOCYTES NFR BLD AUTO: 27 % (ref 14–44)
MCH RBC QN AUTO: 31.7 PG (ref 26.8–34.3)
MCHC RBC AUTO-ENTMCNC: 33.6 G/DL (ref 31.4–37.4)
MCV RBC AUTO: 94 FL (ref 82–98)
MONOCYTES # BLD AUTO: 0.49 THOUSAND/ΜL (ref 0.17–1.22)
MONOCYTES NFR BLD AUTO: 9 % (ref 4–12)
NEUTROPHILS # BLD AUTO: 3.28 THOUSANDS/ΜL (ref 1.85–7.62)
NEUTS SEG NFR BLD AUTO: 61 % (ref 43–75)
NONHDLC SERPL-MCNC: 79 MG/DL
NRBC BLD AUTO-RTO: 0 /100 WBCS
PLATELET # BLD AUTO: 163 THOUSANDS/UL (ref 149–390)
PMV BLD AUTO: 9.7 FL (ref 8.9–12.7)
POTASSIUM SERPL-SCNC: 4.1 MMOL/L (ref 3.5–5.3)
PROT SERPL-MCNC: 7 G/DL (ref 6.4–8.2)
RBC # BLD AUTO: 4.86 MILLION/UL (ref 3.88–5.62)
SODIUM SERPL-SCNC: 141 MMOL/L (ref 136–145)
TRIGL SERPL-MCNC: 64 MG/DL
TSH SERPL DL<=0.05 MIU/L-ACNC: 1.81 UIU/ML (ref 0.36–3.74)
WBC # BLD AUTO: 5.34 THOUSAND/UL (ref 4.31–10.16)

## 2021-11-19 PROCEDURE — 84443 ASSAY THYROID STIM HORMONE: CPT

## 2021-11-19 PROCEDURE — 80053 COMPREHEN METABOLIC PANEL: CPT

## 2021-11-19 PROCEDURE — 80061 LIPID PANEL: CPT

## 2021-11-19 PROCEDURE — 84153 ASSAY OF PSA TOTAL: CPT

## 2021-11-19 PROCEDURE — 85025 COMPLETE CBC W/AUTO DIFF WBC: CPT

## 2021-11-19 PROCEDURE — 84154 ASSAY OF PSA FREE: CPT

## 2021-11-19 PROCEDURE — 36415 COLL VENOUS BLD VENIPUNCTURE: CPT

## 2021-11-23 DIAGNOSIS — K59.00 CONSTIPATION, UNSPECIFIED CONSTIPATION TYPE: ICD-10-CM

## 2021-11-23 DIAGNOSIS — E78.5 HYPERLIPIDEMIA, UNSPECIFIED HYPERLIPIDEMIA TYPE: ICD-10-CM

## 2021-11-23 DIAGNOSIS — E03.9 HYPOTHYROIDISM, UNSPECIFIED TYPE: ICD-10-CM

## 2021-11-23 DIAGNOSIS — F41.9 ANXIETY: ICD-10-CM

## 2021-11-23 LAB
PSA FREE MFR SERPL: 26.4 %
PSA FREE SERPL-MCNC: 2.51 NG/ML
PSA SERPL-MCNC: 9.5 NG/ML (ref 0–4)

## 2021-11-23 RX ORDER — DOCUSATE SODIUM 100 MG/1
100 CAPSULE, LIQUID FILLED ORAL DAILY
Qty: 90 CAPSULE | Refills: 1 | Status: SHIPPED | OUTPATIENT
Start: 2021-11-23 | End: 2022-05-23 | Stop reason: SDUPTHER

## 2021-11-23 RX ORDER — CLONAZEPAM 0.5 MG/1
0.5 TABLET ORAL
Qty: 30 TABLET | Refills: 2 | Status: SHIPPED | OUTPATIENT
Start: 2021-11-23 | End: 2022-02-23 | Stop reason: SDUPTHER

## 2021-11-23 RX ORDER — LEVOTHYROXINE SODIUM 0.07 MG/1
75 TABLET ORAL DAILY
Qty: 90 TABLET | Refills: 1 | Status: SHIPPED | OUTPATIENT
Start: 2021-11-23 | End: 2022-05-23 | Stop reason: SDUPTHER

## 2021-11-23 RX ORDER — SIMVASTATIN 40 MG
40 TABLET ORAL
Qty: 90 TABLET | Refills: 1 | Status: SHIPPED | OUTPATIENT
Start: 2021-11-23 | End: 2022-05-23 | Stop reason: SDUPTHER

## 2021-12-01 ENCOUNTER — TELEMEDICINE (OUTPATIENT)
Dept: FAMILY MEDICINE CLINIC | Facility: CLINIC | Age: 71
End: 2021-12-01
Payer: MEDICARE

## 2021-12-01 VITALS — SYSTOLIC BLOOD PRESSURE: 108 MMHG | HEART RATE: 80 BPM | DIASTOLIC BLOOD PRESSURE: 64 MMHG

## 2021-12-01 DIAGNOSIS — Z00.00 ENCOUNTER FOR MEDICARE ANNUAL WELLNESS EXAM: Primary | ICD-10-CM

## 2021-12-01 DIAGNOSIS — G25.81 RESTLESS LEGS SYNDROME: ICD-10-CM

## 2021-12-01 DIAGNOSIS — E03.8 OTHER SPECIFIED HYPOTHYROIDISM: ICD-10-CM

## 2021-12-01 DIAGNOSIS — R97.20 ELEVATED PSA: ICD-10-CM

## 2021-12-01 DIAGNOSIS — Z12.11 COLON CANCER SCREENING: ICD-10-CM

## 2021-12-01 PROCEDURE — 1123F ACP DISCUSS/DSCN MKR DOCD: CPT | Performed by: NURSE PRACTITIONER

## 2021-12-01 PROCEDURE — G0439 PPPS, SUBSEQ VISIT: HCPCS | Performed by: NURSE PRACTITIONER

## 2021-12-07 ENCOUNTER — APPOINTMENT (OUTPATIENT)
Dept: RADIOLOGY | Facility: CLINIC | Age: 71
End: 2021-12-07
Payer: MEDICARE

## 2021-12-07 DIAGNOSIS — G89.29 CHRONIC LOW BACK PAIN, UNSPECIFIED BACK PAIN LATERALITY, UNSPECIFIED WHETHER SCIATICA PRESENT: ICD-10-CM

## 2021-12-07 DIAGNOSIS — M54.50 CHRONIC LOW BACK PAIN, UNSPECIFIED BACK PAIN LATERALITY, UNSPECIFIED WHETHER SCIATICA PRESENT: ICD-10-CM

## 2021-12-07 DIAGNOSIS — M54.9 MID BACK PAIN: ICD-10-CM

## 2021-12-07 DIAGNOSIS — M25.559 HIP PAIN: ICD-10-CM

## 2021-12-07 PROCEDURE — 72100 X-RAY EXAM L-S SPINE 2/3 VWS: CPT

## 2021-12-07 PROCEDURE — 72070 X-RAY EXAM THORAC SPINE 2VWS: CPT

## 2021-12-07 PROCEDURE — 72170 X-RAY EXAM OF PELVIS: CPT

## 2021-12-15 ENCOUNTER — LAB REQUISITION (OUTPATIENT)
Dept: LAB | Facility: HOSPITAL | Age: 71
End: 2021-12-15
Payer: MEDICARE

## 2021-12-15 DIAGNOSIS — R31.29 OTHER MICROSCOPIC HEMATURIA: ICD-10-CM

## 2021-12-15 LAB
BILIRUB UR QL STRIP: NEGATIVE
CLARITY UR: CLEAR
COLOR UR: YELLOW
GLUCOSE UR STRIP-MCNC: NEGATIVE MG/DL
HGB UR QL STRIP.AUTO: NEGATIVE
KETONES UR STRIP-MCNC: NEGATIVE MG/DL
LEUKOCYTE ESTERASE UR QL STRIP: NEGATIVE
NITRITE UR QL STRIP: NEGATIVE
PH UR STRIP.AUTO: 6.5 [PH]
PROT UR STRIP-MCNC: NEGATIVE MG/DL
SP GR UR STRIP.AUTO: 1.01 (ref 1–1.03)
UROBILINOGEN UR QL STRIP.AUTO: 0.2 E.U./DL

## 2021-12-15 PROCEDURE — 81003 URINALYSIS AUTO W/O SCOPE: CPT | Performed by: UROLOGY

## 2022-02-02 ENCOUNTER — APPOINTMENT (OUTPATIENT)
Dept: LAB | Facility: CLINIC | Age: 72
End: 2022-02-02
Payer: MEDICARE

## 2022-02-02 DIAGNOSIS — R97.20 ELEVATED PSA: ICD-10-CM

## 2022-02-02 PROCEDURE — 84153 ASSAY OF PSA TOTAL: CPT

## 2022-02-02 PROCEDURE — 84154 ASSAY OF PSA FREE: CPT

## 2022-02-02 PROCEDURE — 36415 COLL VENOUS BLD VENIPUNCTURE: CPT

## 2022-02-03 LAB
PSA FREE MFR SERPL: 35.8 %
PSA FREE SERPL-MCNC: 3.08 NG/ML
PSA SERPL-MCNC: 8.6 NG/ML (ref 0–4)

## 2022-02-07 ENCOUNTER — TELEPHONE (OUTPATIENT)
Dept: ADMINISTRATIVE | Facility: OTHER | Age: 72
End: 2022-02-07

## 2022-02-07 NOTE — TELEPHONE ENCOUNTER
Upon review of the In Basket request and the patient's chart, initial outreach has been made via fax, please see Contacts section for details        Att x1    Thank you  Christine Richards

## 2022-02-07 NOTE — LETTER
Procedure Request Form: Colonoscopy      Date Requested: 22  Patient: Kailey Gouty  Patient : 1950   Referring Provider: Amador Eason, REGINANP        Date of Procedure ______________________________       The above patient has informed us that they have completed their   most recent Colonoscopy at your facility  Please complete   this form and attach all corresponding procedure reports/results  Comments _2021-_________________________________________________________  ____________________________________________________________________  ____________________________________________________________________  ____________________________________________________________________    Facility Completing Procedure _________________________________________    Form Completed By (print name) _______________________________________      Signature __________________________________________________________      These reports are needed for  compliance  Please fax this completed form and a copy of the procedure report to our office located at [place] [address] as soon as possible to [phone] [address]: Phone [phone]    We thank you for your assistance in treating our mutual patient

## 2022-02-07 NOTE — TELEPHONE ENCOUNTER
----- Message from Dina Pavon sent at 2/7/2022  8:30 AM EST -----  Regarding: [facility]   02/07/22 8:30 AM    Hello, our patient Conner Rodriguez has had CRC: Colonoscopy completed/performed  Please assist in updating the patient chart by making an External outreach to [facility] facility located in [address] [place] [address]  The date of service is 1/2021  This is also in patients chart under media      Thank you,  LAYLA Pavon PG

## 2022-02-08 NOTE — TELEPHONE ENCOUNTER
Upon review of the In Basket request we were able to locate, review, and update the patient chart as requested for CRC: Colonoscopy  Any additional questions or concerns should be emailed to the Practice Liaisons via [email or web address] email, please do not reply via In Basket      Thank you  Jose Cruz Lugo

## 2022-02-23 DIAGNOSIS — F41.9 ANXIETY: ICD-10-CM

## 2022-02-23 RX ORDER — CLONAZEPAM 0.5 MG/1
0.5 TABLET ORAL
Qty: 30 TABLET | Refills: 2 | Status: SHIPPED | OUTPATIENT
Start: 2022-02-23 | End: 2022-05-23 | Stop reason: SDUPTHER

## 2022-02-23 NOTE — TELEPHONE ENCOUNTER
Patient requesting refill(s) of: clonazepam     Last filled: 11/23/21  Last appt: 9/8/21  Next appt: 3/8/22   [facility]

## 2022-03-08 ENCOUNTER — OFFICE VISIT (OUTPATIENT)
Dept: FAMILY MEDICINE CLINIC | Facility: CLINIC | Age: 72
End: 2022-03-08
Payer: MEDICARE

## 2022-03-08 VITALS
SYSTOLIC BLOOD PRESSURE: 108 MMHG | TEMPERATURE: 97.7 F | OXYGEN SATURATION: 98 % | HEART RATE: 78 BPM | BODY MASS INDEX: 26.83 KG/M2 | HEIGHT: 62 IN | DIASTOLIC BLOOD PRESSURE: 60 MMHG | WEIGHT: 145.8 LBS

## 2022-03-08 DIAGNOSIS — R20.0 NUMBNESS AND TINGLING OF BOTH FEET: Primary | ICD-10-CM

## 2022-03-08 DIAGNOSIS — E03.8 OTHER SPECIFIED HYPOTHYROIDISM: ICD-10-CM

## 2022-03-08 DIAGNOSIS — N40.0 BENIGN PROSTATIC HYPERPLASIA WITHOUT LOWER URINARY TRACT SYMPTOMS: ICD-10-CM

## 2022-03-08 DIAGNOSIS — R20.2 NUMBNESS AND TINGLING OF BOTH FEET: Primary | ICD-10-CM

## 2022-03-08 DIAGNOSIS — D64.9 ANEMIA, UNSPECIFIED TYPE: ICD-10-CM

## 2022-03-08 DIAGNOSIS — E78.00 HYPERCHOLESTEROLEMIA: ICD-10-CM

## 2022-03-08 DIAGNOSIS — K21.00 GASTROESOPHAGEAL REFLUX DISEASE WITH ESOPHAGITIS, UNSPECIFIED WHETHER HEMORRHAGE: ICD-10-CM

## 2022-03-08 DIAGNOSIS — E55.9 VITAMIN D DEFICIENCY: ICD-10-CM

## 2022-03-08 PROCEDURE — 99214 OFFICE O/P EST MOD 30 MIN: CPT | Performed by: NURSE PRACTITIONER

## 2022-03-08 NOTE — PROGRESS NOTES
[address] [facility]        NAME: Joseph Guy is a 73 y o  male  : 1950    MRN: [id number]  DATE: 2022  TIME: 9:56 AM    Assessment and Plan   Numbness and tingling of both feet [R20 0, R20 2]  1  Numbness and tingling of both feet  EMG 2 limb lower extremity    Vitamin B12    Iron Panel (Includes Ferritin, Iron Sat%, Iron, and TIBC)    Vitamin D 25 hydroxy   2  Gastroesophageal reflux disease with esophagitis, unspecified whether hemorrhage  Iron Panel (Includes Ferritin, Iron Sat%, Iron, and TIBC)    Vitamin D 25 hydroxy   3  Other specified hypothyroidism     4  Benign prostatic hyperplasia without lower urinary tract symptoms     5  Hypercholesterolemia     6  Anemia, unspecified type  Iron Panel (Includes Ferritin, Iron Sat%, Iron, and TIBC)   7  Vitamin D deficiency  Vitamin D 25 hydroxy   1  Numbness and tingling of both feet  Reports a numbness/cold sensation to b/l feet  Worsening over the last 2 months  Will check vitamin b12 level, could be raynaud's disease, tarsal tunnel, neuropathy, b12 deficiency  - EMG 2 limb lower extremity; Future  - Vitamin B12; Future  - Iron Panel (Includes Ferritin, Iron Sat%, Iron, and TIBC); Future  - Vitamin D 25 hydroxy; Future    2  Gastroesophageal reflux disease with esophagitis, unspecified whether hemorrhage    - Iron Panel (Includes Ferritin, Iron Sat%, Iron, and TIBC); Future  - Vitamin D 25 hydroxy; Future    3  Other specified hypothyroidism  - check TSh at next visit  4  Benign prostatic hyperplasia without lower urinary tract symptoms  Continues to follow with Dr Sharif Elena for elevated PSA  5  Hypercholesterolemia      6  Anemia, unspecified type    - Iron Panel (Includes Ferritin, Iron Sat%, Iron, and TIBC); Future    7  Vitamin D deficiency    - Vitamin D 25 hydroxy; Future        Patient Instructions     There are no Patient Instructions on file for this visit          Chief Complaint     Chief Complaint   Patient presents with    Follow-up     Patient here today for follow up of Hypothyroid and HLD   Foot Problem     Left foot feels numb, cold, swollen  First noticed a few months ago  Feels its moving up leg  Now note its happening in right foot  Finger tips feel cold  Does mention years ago having episode with left side numbess, was told it was stress  History of Present Illness       Patient here for routine follow up visit with c/o numbness  Numbness episode 25 years ago, was seen in  ED and neurologist  Was diagnosed with stress, r/o stroke  Reports that he has been having numbness on and off over the years with facial numbness and hand and foot numbness  More recently he has been having cold sensation to left foot that is starting to go up the leg, right foot started with cold sensation  Thyroid- denies any problems  Complaint with medication  Review of Systems   Review of Systems   Constitutional: Negative for fatigue and fever  Respiratory: Negative  Negative for shortness of breath and wheezing  Cardiovascular: Negative  Negative for chest pain and palpitations  Skin: Negative for color change  Neurological: Positive for numbness  Negative for dizziness, weakness and headaches         PHQ-2/9 Depression Screening    Little interest or pleasure in doing things: 0 - not at all  Feeling down, depressed, or hopeless: 0 - not at all  PHQ-2 Score: 0  PHQ-2 Interpretation: Negative depression screen        Current Medications       Current Outpatient Medications:     aspirin 81 MG tablet, Take 81 mg by mouth, Disp: , Rfl:     CALCIUM/MAGNESIUM/ZINC FORMULA PO, Take 1 tablet by mouth daily, Disp: , Rfl:     clobetasol (TEMOVATE) 0 05 % ointment, Apply topically as needed, Disp: , Rfl:     clonazePAM (KlonoPIN) 0 5 mg tablet, Take 1 tablet (0 5 mg total) by mouth daily at bedtime, Disp: 30 tablet, Rfl: 2    docusate sodium (QC Stool Softener) 100 mg capsule, Take 1 capsule (100 mg total) by mouth daily, Disp: 90 capsule, Rfl: 1    GARLIC PO, Take by mouth daily, Disp: , Rfl:     levothyroxine 75 mcg tablet, Take 1 tablet (75 mcg total) by mouth daily, Disp: 90 tablet, Rfl: 1    Misc Natural Products (LUTEIN 20 PO), Take by mouth, Disp: , Rfl:     Multiple Vitamins-Minerals (LUTEIN-ZEAXANTHIN PO), Take 20 mg by mouth, Disp: , Rfl:     Multiple Vitamins-Minerals (MULTIVITAMIN PO), Take by mouth daily, Disp: , Rfl:     Omega-3 Fatty Acids (FISH OIL PO), Take by mouth daily, Disp: , Rfl:     omeprazole (PriLOSEC) 20 mg delayed release capsule, Take 20 mg by mouth, Disp: , Rfl:     simvastatin (ZOCOR) 40 mg tablet, Take 1 tablet (40 mg total) by mouth daily at bedtime, Disp: 90 tablet, Rfl: 1    tamsulosin (FLOMAX) 0 4 mg, Take 1 capsule (0 4 mg total) by mouth daily with dinner (Patient not taking: Reported on 3/8/2022 ), Disp: 90 capsule, Rfl: 1    Current Allergies     Allergies as of 03/08/2022 - Reviewed 03/08/2022   Allergen Reaction Noted    Codeine  08/07/2018            The following portions of the patient's history were reviewed and updated as appropriate: allergies, current medications, past family history, past medical history, past social history, past surgical history and problem list      Past Medical History:   Diagnosis Date    Benign neoplasm of colon     Chronic rhinitis     Colon polyp     Diverticular disease of colon     Dysphagia     GERD (gastroesophageal reflux disease)     Hyperlipidemia     Internal hemorrhoids without complication     PSA (psoriatic arthritis) ([facility] [address] )     elevated    Restless leg syndrome     Thyroid nodule        Past Surgical History:   Procedure Laterality Date    COLONOSCOPY  09/22/2010    COLONOSCOPY  06/13/2007    COLONOSCOPY  05/27/2004    COLONOSCOPY  12/09/2015    GALLBLADDER SURGERY      OTHER SURGICAL HISTORY  11/2012    abcessed tooth    THYROIDECTOMY      TONSILLECTOMY      UPPER GASTROINTESTINAL ENDOSCOPY 10/30/2013       Family History   Problem Relation Age of Onset    Colon cancer Father     Other Father         metastatic: lymph tissue, breast, thyroid, prostate, liver, lung    Other Family         malignant neoplasm of gastrointestinal disease         Medications have been verified  Objective   /60   Pulse 78   Temp 97 7 °F (36 5 °C)   Ht 5' 2" (1 575 m)   Wt 66 1 kg (145 lb 12 8 oz)   SpO2 98%   BMI 26 67 kg/m²        Physical Exam     Physical Exam  Vitals and nursing note reviewed  Constitutional:       General: He is not in acute distress  Appearance: Normal appearance  He is well-developed  He is not ill-appearing  HENT:      Head: Normocephalic and atraumatic  Eyes:      General: Lids are normal    Pulmonary:      Effort: Pulmonary effort is normal  No respiratory distress  Breath sounds: Normal breath sounds  No decreased breath sounds or wheezing  Musculoskeletal:         General: No tenderness or deformity  Normal range of motion  Skin:     General: Skin is warm  Findings: No erythema or rash  Neurological:      Mental Status: He is alert and oriented to person, place, and time  Sensory: Sensation is intact  Comments: B/l feet normal sensation, normal temperature, color, ROM  Reports decreased sensation to plantar foot with ambulation  Left worse than right foot  Psychiatric:         Behavior: Behavior normal  Behavior is cooperative  Thought Content:  Thought content normal

## 2022-03-10 ENCOUNTER — APPOINTMENT (OUTPATIENT)
Dept: LAB | Facility: CLINIC | Age: 72
End: 2022-03-10
Payer: MEDICARE

## 2022-03-10 DIAGNOSIS — R20.0 NUMBNESS AND TINGLING OF BOTH FEET: ICD-10-CM

## 2022-03-10 DIAGNOSIS — D64.9 ANEMIA, UNSPECIFIED TYPE: ICD-10-CM

## 2022-03-10 DIAGNOSIS — E55.9 VITAMIN D DEFICIENCY: ICD-10-CM

## 2022-03-10 DIAGNOSIS — K21.00 GASTROESOPHAGEAL REFLUX DISEASE WITH ESOPHAGITIS, UNSPECIFIED WHETHER HEMORRHAGE: ICD-10-CM

## 2022-03-10 DIAGNOSIS — R20.2 NUMBNESS AND TINGLING OF BOTH FEET: ICD-10-CM

## 2022-03-10 LAB
25(OH)D3 SERPL-MCNC: 35.3 NG/ML (ref 30–100)
FERRITIN SERPL-MCNC: 162 NG/ML (ref 8–388)
IRON SATN MFR SERPL: 34 % (ref 20–50)
IRON SERPL-MCNC: 93 UG/DL (ref 65–175)
TIBC SERPL-MCNC: 277 UG/DL (ref 250–450)
VIT B12 SERPL-MCNC: 515 PG/ML (ref 100–900)

## 2022-03-10 PROCEDURE — 82306 VITAMIN D 25 HYDROXY: CPT

## 2022-03-10 PROCEDURE — 36415 COLL VENOUS BLD VENIPUNCTURE: CPT

## 2022-03-10 PROCEDURE — 82607 VITAMIN B-12: CPT

## 2022-03-10 PROCEDURE — 83540 ASSAY OF IRON: CPT

## 2022-03-10 PROCEDURE — 82728 ASSAY OF FERRITIN: CPT

## 2022-03-10 PROCEDURE — 83550 IRON BINDING TEST: CPT

## 2022-04-14 ENCOUNTER — APPOINTMENT (OUTPATIENT)
Dept: RADIOLOGY | Facility: CLINIC | Age: 72
End: 2022-04-14
Payer: MEDICARE

## 2022-04-14 DIAGNOSIS — K59.00 CONSTIPATION, UNSPECIFIED CONSTIPATION TYPE: ICD-10-CM

## 2022-04-14 DIAGNOSIS — R10.9 ABDOMINAL PAIN, UNSPECIFIED ABDOMINAL LOCATION: ICD-10-CM

## 2022-04-14 PROCEDURE — 74022 RADEX COMPL AQT ABD SERIES: CPT

## 2022-05-09 ENCOUNTER — APPOINTMENT (OUTPATIENT)
Dept: LAB | Facility: CLINIC | Age: 72
End: 2022-05-09
Payer: MEDICARE

## 2022-05-09 DIAGNOSIS — R97.20 ELEVATED PSA: ICD-10-CM

## 2022-05-09 PROCEDURE — 36415 COLL VENOUS BLD VENIPUNCTURE: CPT

## 2022-05-09 PROCEDURE — 84153 ASSAY OF PSA TOTAL: CPT

## 2022-05-09 PROCEDURE — 84154 ASSAY OF PSA FREE: CPT

## 2022-05-10 LAB
PSA FREE MFR SERPL: 38.4 %
PSA FREE SERPL-MCNC: 2.8 NG/ML
PSA SERPL-MCNC: 7.3 NG/ML (ref 0–4)

## 2022-05-18 ENCOUNTER — LAB REQUISITION (OUTPATIENT)
Dept: LAB | Facility: HOSPITAL | Age: 72
End: 2022-05-18
Payer: MEDICARE

## 2022-05-18 DIAGNOSIS — R31.29 OTHER MICROSCOPIC HEMATURIA: ICD-10-CM

## 2022-05-18 LAB
BILIRUB UR QL STRIP: NEGATIVE
CLARITY UR: CLEAR
COLOR UR: NORMAL
GLUCOSE UR STRIP-MCNC: NEGATIVE MG/DL
HGB UR QL STRIP.AUTO: NEGATIVE
KETONES UR STRIP-MCNC: NEGATIVE MG/DL
LEUKOCYTE ESTERASE UR QL STRIP: NEGATIVE
NITRITE UR QL STRIP: NEGATIVE
PH UR STRIP.AUTO: 6.5 [PH]
PROT UR STRIP-MCNC: NEGATIVE MG/DL
SP GR UR STRIP.AUTO: 1.01 (ref 1–1.03)
UROBILINOGEN UR STRIP-ACNC: <2 MG/DL

## 2022-05-18 PROCEDURE — 81003 URINALYSIS AUTO W/O SCOPE: CPT | Performed by: UROLOGY

## 2022-05-23 DIAGNOSIS — E78.5 HYPERLIPIDEMIA, UNSPECIFIED HYPERLIPIDEMIA TYPE: ICD-10-CM

## 2022-05-23 DIAGNOSIS — E03.9 HYPOTHYROIDISM, UNSPECIFIED TYPE: ICD-10-CM

## 2022-05-23 DIAGNOSIS — K59.00 CONSTIPATION, UNSPECIFIED CONSTIPATION TYPE: ICD-10-CM

## 2022-05-23 DIAGNOSIS — F41.9 ANXIETY: ICD-10-CM

## 2022-05-23 NOTE — TELEPHONE ENCOUNTER
Patient requesting refill(s) of: Clonazepam     Last filled: 2/23/2022 30 tabs 2 refills   Last appt: 3/8/2022  Next appt: 9/20/22  Pharmacy: [facility]      Patient requesting refill(s) of: Docusate sodium     Last filled: 11/23/21 90 caps 1 refill   Last appt: 3/8/2022  Next appt: 9/20/22  Pharmacy: [facility]     Patient requesting refill(s) of: Levothyroxine     Last filled: 11/23/2021 90 tabs 1 refill   Last appt: 3/8/2022  Next appt: 9/20/22  [facility]     Patient requesting refill(s) of: Simvastatin     Last filled: 11/23/2021 90 tabs 1 refill   Last appt: 3/8/2022  Next appt: 9/20/22  Ph[facility]

## 2022-05-24 RX ORDER — SIMVASTATIN 40 MG
40 TABLET ORAL
Qty: 90 TABLET | Refills: 1 | Status: SHIPPED | OUTPATIENT
Start: 2022-05-24

## 2022-05-24 RX ORDER — LEVOTHYROXINE SODIUM 0.07 MG/1
75 TABLET ORAL DAILY
Qty: 90 TABLET | Refills: 1 | Status: SHIPPED | OUTPATIENT
Start: 2022-05-24

## 2022-05-24 RX ORDER — CLONAZEPAM 0.5 MG/1
0.5 TABLET ORAL
Qty: 30 TABLET | Refills: 2 | Status: SHIPPED | OUTPATIENT
Start: 2022-05-24

## 2022-05-24 RX ORDER — DOCUSATE SODIUM 100 MG/1
100 CAPSULE, LIQUID FILLED ORAL DAILY
Qty: 90 CAPSULE | Refills: 1 | Status: SHIPPED | OUTPATIENT
Start: 2022-05-24

## 2022-07-07 ENCOUNTER — TELEPHONE (OUTPATIENT)
Dept: FAMILY MEDICINE CLINIC | Facility: CLINIC | Age: 72
End: 2022-07-07

## 2022-07-07 ENCOUNTER — TELEMEDICINE (OUTPATIENT)
Dept: FAMILY MEDICINE CLINIC | Facility: CLINIC | Age: 72
End: 2022-07-07
Payer: MEDICARE

## 2022-07-07 VITALS — SYSTOLIC BLOOD PRESSURE: 136 MMHG | TEMPERATURE: 98.6 F | HEART RATE: 102 BPM | DIASTOLIC BLOOD PRESSURE: 100 MMHG

## 2022-07-07 DIAGNOSIS — U07.1 COVID-19: Primary | ICD-10-CM

## 2022-07-07 PROCEDURE — 99442 PR PHYS/QHP TELEPHONE EVALUATION 11-20 MIN: CPT | Performed by: INTERNAL MEDICINE

## 2022-07-07 RX ORDER — DUTASTERIDE 0.5 MG/1
0.5 CAPSULE, LIQUID FILLED ORAL DAILY
COMMUNITY
Start: 2022-05-18

## 2022-07-07 NOTE — TELEPHONE ENCOUNTER
He was exposed on Sunday with covid , he started with a sore throat he did a home test and it was positive, he is vaccinated, he is asking about a pill he can take

## 2022-07-07 NOTE — PROGRESS NOTES
COVID-19 Outpatient Progress Note    Assessment/Plan:    Problem List Items Addressed This Visit    None     Visit Diagnoses     COVID-19    -  Primary    Relevant Medications    nirmatrelvir & ritonavir (Paxlovid) tablet therapy pack         Disposition:     Patient has COVID-19 infection  Based off CDC guidelines, they were recommended to isolate for 5 days from the date of the positive test  If they remain asymptomatic, isolation may be ended followed by 5 days of wearing a mask when around othes to minimize risk of infecting others  If they have a fever, continue to stay home until fever resolves for at least 24 hours  Discussed vitamin D, vitamin C, and/or zinc supplementation with patient  Instructed to hold simvastatin/Zocor for 10 days while taking paxlovid, start D3 2,000 units daily, get pulse oximeter and monitor (call or go to ER if < 90%)    Patient meets criteria for PAXLOVID and they have been counseled appropriately according to EUA documentation released by the FDA  After discussion, patient agrees to treatment  Italo Zhenger is an investigational medicine used to treat mild-to-moderate COVID-19 in adults and children (15years of age and older weighing at least 80 pounds (40 kg)) with positive results of direct SARS-CoV-2 viral testing, and who are at high risk for progression to severe COVID-19, including hospitalization or death  PAXLOVID is investigational because it is still being studied  There is limited information about the safety and effectiveness of using PAXLOVID to treat people with mild-to-moderate COVID-19  The FDA has authorized the emergency use of PAXLOVID for the treatment of mild-tomoderate COVID-19 in adults and children (15years of age and older weighing at least 80 pounds (40 kg)) with a positive test for the virus that causes COVID-19, and who are at high risk for progression to severe COVID-19, including hospitalization or death, under an EUA       What should I tell my healthcare provider before I take PAXLOVID? Tell your healthcare provider if you:  - Have any allergies  - Have liver or kidney disease  - Are pregnant or plan to become pregnant  - Are breastfeeding a child  - Have any serious illnesses    Tell your healthcare provider about all the medicines you take, including prescription and over-the-counter medicines, vitamins, and herbal supplements  Some medicines may interact with PAXLOVID and may cause serious side effects  Keep a list of your medicines to show your healthcare provider and pharmacist when you get a new medicine  You can ask your healthcare provider or pharmacist for a list of medicines that interact with PAXLOVID  Do not start taking a new medicine without telling your healthcare provider  Your healthcare provider can tell you if it is safe to take PAXLOVID with other medicines  Tell your healthcare provider if you are taking combined hormonal contraceptive  PAXLOVID may affect how your birth control pills work  Females who are able to become pregnant should use another effective alternative form of contraception or an additional barrier method of contraception  Talk to your healthcare provider if you have any questions about contraceptive methods that might be right for you  How do I take PAXLOVID? PAXLOVID consists of 2 medicines: nirmatrelvir and ritonavir  - Take 2 pink tablets of nirmatrelvir with 1 white tablet of ritonavir by mouth 2 times each day (in the morning and in the evening) for 5 days  For each dose, take all 3 tablets at the same time  - If you have kidney disease, talk to your healthcare provider  You may need a different dose  - Swallow the tablets whole  Do not chew, break, or crush the tablets  - Take PAXLOVID with or without food  - Do not stop taking PAXLOVID without talking to your healthcare provider, even if you feel better    - If you miss a dose of PAXLOVID within 8 hours of the time it is usually taken, take it as soon as you remember  If you miss a dose by more than 8 hours, skip the missed dose and take the next dose at your regular time  Do not take 2 doses of PAXLOVID at the same time  - If you take too much PAXLOVID, call your healthcare provider or go to the nearest hospital emergency room right away  - If you are taking a ritonavir- or cobicistat-containing medicine to treat hepatitis C or Human Immunodeficiency Virus (HIV), you should continue to take your medicine as prescribed by your healthcare provider   - Talk to your healthcare provider if you do not feel better or if you feel worse after 5 days  Who should generally not take PAXLOVID? Do not take PAXLOVID if:  You are allergic to nirmatrelvir, ritonavir, or any of the ingredients in PAXLOVID  You are taking any of the following medicines:  - Alfuzosin  - Pethidine, piroxicam, propoxyphene  - Ranolazine  - Amiodarone, dronedarone, flecainide, propafenone, quinidine  - Colchicine  - Lurasidone, pimozide, clozapine  - Dihydroergotamine, ergotamine, methylergonovine  - Lovastatin, simvastatin  - Sildenafil (Revatio®) for pulmonary arterial hypertension (PAH)  - Triazolam, oral midazolam  - Apalutamide  - Carbamazepine, phenobarbital, phenytoin  - Rifampin  - St  Wallys Wort (hypericum perforatum)    What are the important possible side effects of PAXLOVID? Possible side effects of PAXLOVID are:  - Liver Problems  Tell your healthcare provider right away if you have any of these signs and symptoms of liver problems: loss of appetite, yellowing of your skin and the whites of eyes (jaundice), dark-colored urine, pale colored stools and itchy skin, stomach area (abdominal) pain  - Resistance to HIV Medicines  If you have untreated HIV infection, PAXLOVID may lead to some HIV medicines not working as well in the future    - Other possible side effects include: altered sense of taste, diarrhea, high blood pressure, or muscle aches    These are not all the possible side effects of PAXLOVID  Not many people have taken PAXLOVID  Serious and unexpected side effects may happen  Jazoe Graham is still being studied, so it is possible that all of the risks are not known at this time  What other treatment choices are there? Like Charisse Martinez may allow for the emergency use of other medicines to treat people with COVID-19  Go to https://[employer]/ for information on the emergency use of other medicines that are authorized by FDA to treat people with COVID-19  Your healthcare provider may talk with you about clinical trials for which you may be eligible  It is your choice to be treated or not to be treated with PAXLOVID  Should you decide not to receive it or for your child not to receive it, it will not change your standard medical care  What if I am pregnant or breastfeeding? There is no experience treating pregnant women or breastfeeding mothers with PAXLOVID  For a mother and unborn baby, the benefit of taking PAXLOVID may be greater than the risk from the treatment  If you are pregnant, discuss your options and specific situation with your healthcare provider  It is recommended that you use effective barrier contraception or do not have sexual activity while taking PAXLOVID  If you are breastfeeding, discuss your options and specific situation with your healthcare provider  How do I report side effects with PAXLOVID? Contact your healthcare provider if you have any side effects that bother you or do not go away  Report side effects to FDA MedWatch at www fda gov/medwatch or call 1-508-YWY2408 or you can report side effects to [facility]  at the contact information provided below  Website Fax number Telephone number   [employer] [phone]     How should I store Jazoe Graham?     Store PAXLOVID tablets at room temperature between 68°F to 77°F (20°C to 25°C)  Full fact sheet for patients, parents, and caregivers can be found at: [employer]    I have spent 15 minutes directly with the patient  Greater than 50% of this time was spent in counseling/coordination of care regarding: instructions for management, patient and family education and impressions  Encounter provider Michelle Steel MD    Provider located at [address] [place] [id number] [phone]    Recent Visits  No visits were found meeting these conditions  Showing recent visits within past 7 days and meeting all other requirements  Today's Visits  Date Type Provider Dept   07/07/22 Telemedicine Jennifer Bauer MD [facility]   07/07/22 Telephone Inga Martinez [facility]   Showing today's visits and meeting all other requirements  Future Appointments  No visits were found meeting these conditions  Showing future appointments within next 150 days and meeting all other requirements     This virtual check-in was done via telephone and he agrees to proceed  Patient agrees to participate in a virtual check in via telephone or video visit instead of presenting to the office to address urgent/immediate medical needs  Patient is aware this is a billable service  After connecting through Telephone, the patient was identified by name and date of birth  Zhane Andrew was informed that this was a telemedicine visit and that the exam was being conducted confidentially over secure lines  My office door was closed  No one else was in the room  Zhane Andrew acknowledged consent and understanding of privacy and security of the telemedicine visit   I informed the patient that I have reviewed his record in Epic and presented the opportunity for him to ask any questions regarding the visit today  The patient agreed to participate  Verification of patient location:  Patient is located in the following state in which I hold an active license: [place]    Subjective:   Conner Rodriguez is a 81 y o  male who has been screened for COVID-19  Patient's symptoms include fatigue, nasal congestion, rhinorrhea, cough and headache  Patient denies fever, chills, anosmia, loss of taste, shortness of breath, chest tightness, abdominal pain, nausea, vomiting and diarrhea  - Date of symptom onset: 7/5/2022  - Date of exposure: 7/3/2022  - Date of positive COVID-19 test: 7/7/2022  Type of test: Home antigen  Patient with typical symptoms of COVID-19 and they attest that they were positive on home rapid antigen testing  Image of positive result is not able to be uploaded into their chart  COVID-19 vaccination status: Fully vaccinated with booster    Nicolasa Alves has been staying home and has isolated themselves in his home  He is taking care to not share personal items and is cleaning all surfaces that are touched often, like counters, tabletops, and doorknobs using household cleaning sprays or wipes  He is wearing a mask when he leaves his room       Lab Results   Component Value Date    SARSCOV2 Negative 09/20/2021     Past Medical History:   Diagnosis Date    Benign neoplasm of colon     Chronic rhinitis     Colon polyp     Diverticular disease of colon     Dysphagia     GERD (gastroesophageal reflux disease)     Hyperlipidemia     Internal hemorrhoids without complication     PSA (psoriatic arthritis) (HCC)     elevated    Restless leg syndrome     Thyroid nodule      Past Surgical History:   Procedure Laterality Date    COLONOSCOPY  09/22/2010    COLONOSCOPY  06/13/2007    COLONOSCOPY  05/27/2004    COLONOSCOPY  12/09/2015    GALLBLADDER SURGERY      OTHER SURGICAL HISTORY  11/2012    abcessed tooth    THYROIDECTOMY      TONSILLECTOMY      UPPER GASTROINTESTINAL ENDOSCOPY  10/30/2013 Current Outpatient Medications   Medication Sig Dispense Refill    aspirin 81 MG tablet Take 81 mg by mouth      CALCIUM/MAGNESIUM/ZINC FORMULA PO Take 1 tablet by mouth daily      clobetasol (TEMOVATE) 0 05 % ointment Apply topically as needed      clonazePAM (KlonoPIN) 0 5 mg tablet Take 1 tablet (0 5 mg total) by mouth daily at bedtime 30 tablet 2    docusate sodium (QC Stool Softener) 100 mg capsule Take 1 capsule (100 mg total) by mouth in the morning  90 capsule 1    dutasteride (AVODART) 0 5 mg capsule Take 0 5 mg by mouth daily      GARLIC PO Take by mouth daily      levothyroxine 75 mcg tablet Take 1 tablet (75 mcg total) by mouth in the morning  90 tablet 1    Misc Natural Products (LUTEIN 20 PO) Take by mouth      Multiple Vitamins-Minerals (LUTEIN-ZEAXANTHIN PO) Take 20 mg by mouth      Multiple Vitamins-Minerals (MULTIVITAMIN PO) Take by mouth daily      nirmatrelvir & ritonavir (Paxlovid) tablet therapy pack Take 3 tablets by mouth 2 (two) times a day for 5 days Take 2 nirmatrelvir tablets + 1 ritonavir tablet together per dose 30 tablet 0    Omega-3 Fatty Acids (FISH OIL PO) Take by mouth daily      omeprazole (PriLOSEC) 20 mg delayed release capsule Take 20 mg by mouth      simvastatin (ZOCOR) 40 mg tablet Take 1 tablet (40 mg total) by mouth daily at bedtime 90 tablet 1    tamsulosin (FLOMAX) 0 4 mg Take 1 capsule (0 4 mg total) by mouth daily with dinner (Patient not taking: No sig reported) 90 capsule 1     No current facility-administered medications for this visit  Allergies   Allergen Reactions    Codeine        Review of Systems   Constitutional: Positive for fatigue  Negative for chills and fever  HENT: Positive for congestion and rhinorrhea  Respiratory: Positive for cough  Negative for chest tightness and shortness of breath  Gastrointestinal: Negative for abdominal pain, diarrhea, nausea and vomiting  Neurological: Positive for headaches  Objective:    Vitals:    07/07/22 1236   BP: 136/100   Pulse: 102   Temp: 98 6 °F (37 °C)       Physical Exam  Vitals reviewed  Constitutional:       General: He is not in acute distress  Pulmonary:      Effort: Pulmonary effort is normal  No respiratory distress  Neurological:      Mental Status: He is alert  Psychiatric:         Mood and Affect: Mood normal          Behavior: Behavior normal          VIRTUAL VISIT DISCLAIMER    Liu Wilkins verbally agrees to participate in [employer]  Pt is aware that [employer] could be limited without vital signs or the ability to perform a full hands-on physical exam  Jaguar Perry understands he or the provider may request at any time to terminate the video visit and request the patient to seek care or treatment in person

## 2022-08-22 DIAGNOSIS — F41.9 ANXIETY: ICD-10-CM

## 2022-08-22 RX ORDER — CLONAZEPAM 0.5 MG/1
0.5 TABLET ORAL
Qty: 30 TABLET | Refills: 2 | Status: SHIPPED | OUTPATIENT
Start: 2022-08-22

## 2022-08-22 NOTE — TELEPHONE ENCOUNTER
Patient requesting refill(s) of:  Klonopin 0 5mg   Last filled:5/24/22  Last appt:7/7/22  Next appt:9/20/22  Pharmacy:[facility] no fever and no chills.

## 2022-09-13 ENCOUNTER — RA CDI HCC (OUTPATIENT)
Dept: OTHER | Facility: HOSPITAL | Age: 72
End: 2022-09-13

## 2022-09-13 NOTE — PROGRESS NOTES
[address]  coding opportunities       Chart reviewed, no opportunity found: CHART REVIEWED, NO OPPORTUNITY FOUND        Patients Insurance     Medicare Insurance: Medicare

## 2022-09-20 ENCOUNTER — OFFICE VISIT (OUTPATIENT)
Dept: FAMILY MEDICINE CLINIC | Facility: CLINIC | Age: 72
End: 2022-09-20
Payer: MEDICARE

## 2022-09-20 VITALS
TEMPERATURE: 98 F | SYSTOLIC BLOOD PRESSURE: 118 MMHG | HEART RATE: 74 BPM | RESPIRATION RATE: 20 BRPM | BODY MASS INDEX: 26.5 KG/M2 | OXYGEN SATURATION: 99 % | DIASTOLIC BLOOD PRESSURE: 72 MMHG | HEIGHT: 62 IN | WEIGHT: 144 LBS

## 2022-09-20 DIAGNOSIS — N40.0 BENIGN PROSTATIC HYPERPLASIA WITHOUT LOWER URINARY TRACT SYMPTOMS: ICD-10-CM

## 2022-09-20 DIAGNOSIS — K21.00 GASTROESOPHAGEAL REFLUX DISEASE WITH ESOPHAGITIS, UNSPECIFIED WHETHER HEMORRHAGE: Primary | ICD-10-CM

## 2022-09-20 DIAGNOSIS — E78.00 HYPERCHOLESTEROLEMIA: ICD-10-CM

## 2022-09-20 DIAGNOSIS — G25.81 RESTLESS LEGS SYNDROME: ICD-10-CM

## 2022-09-20 DIAGNOSIS — E03.8 OTHER SPECIFIED HYPOTHYROIDISM: ICD-10-CM

## 2022-09-20 PROCEDURE — 99214 OFFICE O/P EST MOD 30 MIN: CPT | Performed by: NURSE PRACTITIONER

## 2022-09-20 RX ORDER — TAMSULOSIN HYDROCHLORIDE 0.4 MG/1
0.8 CAPSULE ORAL
Qty: 90 CAPSULE | Refills: 1
Start: 2022-09-20

## 2022-09-20 NOTE — PROGRESS NOTES
[facility]        NAME: Saeed Lundberg is a 81 y o  male  : 1950    MRN: [id number]  DATE: 2022  TIME: 9:51 AM    Assessment and Plan   Gastroesophageal reflux disease with esophagitis, unspecified whether hemorrhage [K21 00]  1  Gastroesophageal reflux disease with esophagitis, unspecified whether hemorrhage  Comprehensive metabolic panel    CBC and differential   2  Benign prostatic hyperplasia without lower urinary tract symptoms  tamsulosin (FLOMAX) 0 4 mg   3  Hypercholesterolemia  Lipid panel    Comprehensive metabolic panel   4  Other specified hypothyroidism  TSH, 3rd generation with Free T4 reflex   5  Restless legs syndrome     1  Gastroesophageal reflux disease with esophagitis, unspecified whether hemorrhage    - Comprehensive metabolic panel; Future  - CBC and differential; Future    2  Benign prostatic hyperplasia without lower urinary tract symptoms  Following with Dr Kale Mckeon  On flomax and avodart  - tamsulosin (FLOMAX) 0 4 mg; Take 2 capsules (0 8 mg total) by mouth daily with dinner  Dispense: 90 capsule; Refill: 1    3  Hypercholesterolemia  Check lipid panel yearly, will get done before medicare wellness visit  - Lipid panel; Future  - Comprehensive metabolic panel; Future    4  Other specified hypothyroidism    - TSH, 3rd generation with Free T4 reflex; Future    5  Restless legs syndrome  - klonopin at night  Patient Instructions     There are no Patient Instructions on file for this visit  Chief Complaint     Chief Complaint   Patient presents with    Follow-up         History of Present Illness       Patient here for routine follow up visit  Reports no complaints  Restless leg- well controled with klonopin  No problems  Follow with with Dr Kale Mckeon for elevated PSA  Was started on avodart 0 5mg about 4 months ago  Will have PSA drawn before this visit in November   Continues with urinary frequency and slow urinary flow      Taking stool softener everyday  Review of Systems   Review of Systems   Constitutional: Negative for activity change, appetite change, chills, fatigue and fever  HENT: Negative for congestion, ear pain, nosebleeds, rhinorrhea and sore throat  Eyes: Negative for photophobia, pain, redness and visual disturbance  Respiratory: Negative for cough, shortness of breath and wheezing  Cardiovascular: Negative  Negative for chest pain  Gastrointestinal: Negative  Negative for abdominal pain, constipation, diarrhea and vomiting  Endocrine: Negative  Genitourinary: Positive for difficulty urinating  Negative for dysuria and flank pain  Musculoskeletal: Negative  Skin: Negative for color change and rash  Neurological: Negative for dizziness, weakness, numbness and headaches  Hematological: Negative for adenopathy  Psychiatric/Behavioral: Negative for agitation and confusion  The patient is not nervous/anxious          PHQ-2/9 Depression Screening    Little interest or pleasure in doing things: 0 - not at all  Feeling down, depressed, or hopeless: 0 - not at all  PHQ-2 Score: 0  PHQ-2 Interpretation: Negative depression screen        Current Medications       Current Outpatient Medications:     aspirin 81 MG tablet, Take 81 mg by mouth, Disp: , Rfl:     CALCIUM/MAGNESIUM/ZINC FORMULA PO, Take 1 tablet by mouth daily, Disp: , Rfl:     clobetasol (TEMOVATE) 0 05 % ointment, Apply topically as needed, Disp: , Rfl:     clonazePAM (KlonoPIN) 0 5 mg tablet, Take 1 tablet (0 5 mg total) by mouth daily at bedtime, Disp: 30 tablet, Rfl: 2    docusate sodium (QC Stool Softener) 100 mg capsule, Take 1 capsule (100 mg total) by mouth in the morning , Disp: 90 capsule, Rfl: 1    dutasteride (AVODART) 0 5 mg capsule, Take 0 5 mg by mouth daily, Disp: , Rfl:     GARLIC PO, Take by mouth daily, Disp: , Rfl:     levothyroxine 75 mcg tablet, Take 1 tablet (75 mcg total) by mouth in the morning , Disp: 90 tablet, Rfl: 1    Misc Natural Products (LUTEIN 20 PO), Take by mouth, Disp: , Rfl:     Multiple Vitamins-Minerals (LUTEIN-ZEAXANTHIN PO), Take 20 mg by mouth, Disp: , Rfl:     Multiple Vitamins-Minerals (MULTIVITAMIN PO), Take by mouth daily, Disp: , Rfl:     Omega-3 Fatty Acids (FISH OIL PO), Take by mouth daily, Disp: , Rfl:     omeprazole (PriLOSEC) 20 mg delayed release capsule, Take 20 mg by mouth, Disp: , Rfl:     simvastatin (ZOCOR) 40 mg tablet, Take 1 tablet (40 mg total) by mouth daily at bedtime, Disp: 90 tablet, Rfl: 1    tamsulosin (FLOMAX) 0 4 mg, Take 2 capsules (0 8 mg total) by mouth daily with dinner, Disp: 90 capsule, Rfl: 1    Current Allergies     Allergies as of 09/20/2022 - Reviewed 09/20/2022   Allergen Reaction Noted    Codeine  08/07/2018            The following portions of the patient's history were reviewed and updated as appropriate: allergies, current medications, past family history, past medical history, past social history, past surgical history and problem list      Past Medical History:   Diagnosis Date    Benign neoplasm of colon     Chronic rhinitis     Colon polyp     Diverticular disease of colon     Dysphagia     GERD (gastroesophageal reflux disease)     Hyperlipidemia     Internal hemorrhoids without complication     PSA (psoriatic arthritis) (HCC)     elevated    Restless leg syndrome     Thyroid nodule        Past Surgical History:   Procedure Laterality Date    COLONOSCOPY  09/22/2010    COLONOSCOPY  06/13/2007    COLONOSCOPY  05/27/2004    COLONOSCOPY  12/09/2015    GALLBLADDER SURGERY      OTHER SURGICAL HISTORY  11/2012    abcessed tooth    THYROIDECTOMY      TONSILLECTOMY      UPPER GASTROINTESTINAL ENDOSCOPY  10/30/2013       Family History   Problem Relation Age of Onset    Colon cancer Father     Other Father         metastatic: lymph tissue, breast, thyroid, prostate, liver, lung    Other Family         malignant neoplasm of gastrointestinal disease         Medications have been verified  Objective   /72   Pulse 74   Temp 98 °F (36 7 °C) (Tympanic)   Resp 20   Ht 5' 2" (1 575 m)   Wt 65 3 kg (144 lb)   SpO2 99%   BMI 26 34 kg/m²        Physical Exam     Physical Exam  Vitals and nursing note reviewed  Constitutional:       General: He is not in acute distress  Appearance: Normal appearance  He is well-developed  He is not ill-appearing  Eyes:      General: Lids are normal    Cardiovascular:      Rate and Rhythm: Normal rate and regular rhythm  Heart sounds: Normal heart sounds, S1 normal and S2 normal  No murmur heard  No friction rub  No gallop  Pulmonary:      Effort: Pulmonary effort is normal  No respiratory distress  Breath sounds: Normal breath sounds  No decreased breath sounds or wheezing  Musculoskeletal:         General: No tenderness or deformity  Normal range of motion  Skin:     General: Skin is warm  Findings: No erythema or rash  Neurological:      Mental Status: He is alert and oriented to person, place, and time  Psychiatric:         Behavior: Behavior normal  Behavior is cooperative  Thought Content:  Thought content normal

## 2022-10-05 ENCOUNTER — IMMUNIZATIONS (OUTPATIENT)
Dept: FAMILY MEDICINE CLINIC | Facility: CLINIC | Age: 72
End: 2022-10-05
Payer: MEDICARE

## 2022-10-05 DIAGNOSIS — Z23 ENCOUNTER FOR IMMUNIZATION: Primary | ICD-10-CM

## 2022-10-05 PROCEDURE — G0008 ADMIN INFLUENZA VIRUS VAC: HCPCS

## 2022-10-05 PROCEDURE — 90662 IIV NO PRSV INCREASED AG IM: CPT

## 2022-11-07 ENCOUNTER — HOSPITAL ENCOUNTER (OUTPATIENT)
Dept: NEUROLOGY | Facility: CLINIC | Age: 72
Discharge: HOME | End: 2022-11-07

## 2022-11-07 DIAGNOSIS — R20.2 NUMBNESS AND TINGLING OF BOTH FEET: ICD-10-CM

## 2022-11-07 DIAGNOSIS — R20.0 NUMBNESS AND TINGLING OF BOTH FEET: ICD-10-CM

## 2022-11-08 ENCOUNTER — APPOINTMENT (OUTPATIENT)
Dept: LAB | Facility: CLINIC | Age: 72
End: 2022-11-08

## 2022-11-08 DIAGNOSIS — E03.8 OTHER SPECIFIED HYPOTHYROIDISM: ICD-10-CM

## 2022-11-08 DIAGNOSIS — E78.00 HYPERCHOLESTEROLEMIA: ICD-10-CM

## 2022-11-08 DIAGNOSIS — R97.20 ELEVATED PROSTATE SPECIFIC ANTIGEN (PSA): ICD-10-CM

## 2022-11-08 DIAGNOSIS — K21.00 GASTROESOPHAGEAL REFLUX DISEASE WITH ESOPHAGITIS, UNSPECIFIED WHETHER HEMORRHAGE: ICD-10-CM

## 2022-11-08 LAB
ALBUMIN SERPL BCP-MCNC: 3.6 G/DL (ref 3.5–5)
ALP SERPL-CCNC: 46 U/L (ref 46–116)
ALT SERPL W P-5'-P-CCNC: 29 U/L (ref 12–78)
ANION GAP SERPL CALCULATED.3IONS-SCNC: 8 MMOL/L (ref 4–13)
AST SERPL W P-5'-P-CCNC: 18 U/L (ref 5–45)
BASOPHILS # BLD AUTO: 0.05 THOUSANDS/ÂΜL (ref 0–0.1)
BASOPHILS NFR BLD AUTO: 1 % (ref 0–1)
BILIRUB SERPL-MCNC: 0.5 MG/DL (ref 0.2–1)
BUN SERPL-MCNC: 16 MG/DL (ref 5–25)
CALCIUM SERPL-MCNC: 9.8 MG/DL (ref 8.3–10.1)
CHLORIDE SERPL-SCNC: 109 MMOL/L (ref 96–108)
CHOLEST SERPL-MCNC: 129 MG/DL
CO2 SERPL-SCNC: 25 MMOL/L (ref 21–32)
CREAT SERPL-MCNC: 0.84 MG/DL (ref 0.6–1.3)
EOSINOPHIL # BLD AUTO: 0.11 THOUSAND/ÂΜL (ref 0–0.61)
EOSINOPHIL NFR BLD AUTO: 2 % (ref 0–6)
ERYTHROCYTE [DISTWIDTH] IN BLOOD BY AUTOMATED COUNT: 12.7 % (ref 11.6–15.1)
GFR SERPL CREATININE-BSD FRML MDRD: 88 ML/MIN/1.73SQ M
GLUCOSE P FAST SERPL-MCNC: 101 MG/DL (ref 65–99)
HCT VFR BLD AUTO: 45.6 % (ref 36.5–49.3)
HDLC SERPL-MCNC: 46 MG/DL
HGB BLD-MCNC: 15.1 G/DL (ref 12–17)
IMM GRANULOCYTES # BLD AUTO: 0.02 THOUSAND/UL (ref 0–0.2)
IMM GRANULOCYTES NFR BLD AUTO: 0 % (ref 0–2)
LDLC SERPL CALC-MCNC: 68 MG/DL (ref 0–100)
LYMPHOCYTES # BLD AUTO: 1.52 THOUSANDS/ÂΜL (ref 0.6–4.47)
LYMPHOCYTES NFR BLD AUTO: 26 % (ref 14–44)
MCH RBC QN AUTO: 31.9 PG (ref 26.8–34.3)
MCHC RBC AUTO-ENTMCNC: 33.1 G/DL (ref 31.4–37.4)
MCV RBC AUTO: 96 FL (ref 82–98)
MONOCYTES # BLD AUTO: 0.51 THOUSAND/ÂΜL (ref 0.17–1.22)
MONOCYTES NFR BLD AUTO: 9 % (ref 4–12)
NEUTROPHILS # BLD AUTO: 3.63 THOUSANDS/ÂΜL (ref 1.85–7.62)
NEUTS SEG NFR BLD AUTO: 62 % (ref 43–75)
NONHDLC SERPL-MCNC: 83 MG/DL
NRBC BLD AUTO-RTO: 0 /100 WBCS
PLATELET # BLD AUTO: 170 THOUSANDS/UL (ref 149–390)
PMV BLD AUTO: 10 FL (ref 8.9–12.7)
POTASSIUM SERPL-SCNC: 4.2 MMOL/L (ref 3.5–5.3)
PROT SERPL-MCNC: 6.9 G/DL (ref 6.4–8.4)
RBC # BLD AUTO: 4.73 MILLION/UL (ref 3.88–5.62)
SODIUM SERPL-SCNC: 142 MMOL/L (ref 135–147)
TRIGL SERPL-MCNC: 75 MG/DL
TSH SERPL DL<=0.05 MIU/L-ACNC: 2.19 UIU/ML (ref 0.45–4.5)
WBC # BLD AUTO: 5.84 THOUSAND/UL (ref 4.31–10.16)

## 2022-11-09 LAB
PSA FREE MFR SERPL: 24 %
PSA FREE SERPL-MCNC: 1.49 NG/ML
PSA SERPL-MCNC: 6.2 NG/ML (ref 0–4)

## 2022-11-17 DIAGNOSIS — F41.9 ANXIETY: ICD-10-CM

## 2022-11-18 RX ORDER — CLONAZEPAM 0.5 MG/1
0.5 TABLET ORAL
Qty: 30 TABLET | Refills: 0 | Status: SHIPPED | OUTPATIENT
Start: 2022-11-18

## 2022-12-07 ENCOUNTER — OFFICE VISIT (OUTPATIENT)
Dept: FAMILY MEDICINE CLINIC | Facility: CLINIC | Age: 72
End: 2022-12-07

## 2022-12-07 VITALS
DIASTOLIC BLOOD PRESSURE: 68 MMHG | TEMPERATURE: 98.2 F | BODY MASS INDEX: 26.94 KG/M2 | WEIGHT: 146.4 LBS | SYSTOLIC BLOOD PRESSURE: 108 MMHG | HEIGHT: 62 IN | HEART RATE: 84 BPM | OXYGEN SATURATION: 98 %

## 2022-12-07 DIAGNOSIS — E78.00 HYPERCHOLESTEROLEMIA: ICD-10-CM

## 2022-12-07 DIAGNOSIS — R20.0 NUMBNESS AND TINGLING OF BOTH FEET: ICD-10-CM

## 2022-12-07 DIAGNOSIS — E03.9 HYPOTHYROIDISM, UNSPECIFIED TYPE: ICD-10-CM

## 2022-12-07 DIAGNOSIS — Z00.00 ENCOUNTER FOR MEDICARE ANNUAL WELLNESS EXAM: Primary | ICD-10-CM

## 2022-12-07 DIAGNOSIS — R20.2 NUMBNESS AND TINGLING OF BOTH FEET: ICD-10-CM

## 2022-12-07 DIAGNOSIS — G25.81 RESTLESS LEGS SYNDROME: ICD-10-CM

## 2022-12-07 DIAGNOSIS — K21.00 GASTROESOPHAGEAL REFLUX DISEASE WITH ESOPHAGITIS, UNSPECIFIED WHETHER HEMORRHAGE: ICD-10-CM

## 2022-12-07 RX ORDER — CLONAZEPAM 0.25 MG/1
0.25 TABLET, ORALLY DISINTEGRATING ORAL 2 TIMES DAILY
Qty: 60 TABLET | Refills: 0 | Status: SHIPPED | OUTPATIENT
Start: 2022-12-07

## 2022-12-07 NOTE — PATIENT INSTRUCTIONS
Medicare Preventive Visit Patient Instructions  Thank you for completing your Welcome to Medicare Visit or Medicare Annual Wellness Visit today  Your next wellness visit will be due in one year (12/8/2023)  The screening/preventive services that you may require over the next 5-10 years are detailed below  Some tests may not apply to you based off risk factors and/or age  Screening tests ordered at today's visit but not completed yet may show as past due  Also, please note that scanned in results may not display below  Preventive Screenings:  Service Recommendations Previous Testing/Comments   Colorectal Cancer Screening  · Colonoscopy    · Fecal Occult Blood Test (FOBT)/Fecal Immunochemical Test (FIT)  · Fecal DNA/Cologuard Test  · Flexible Sigmoidoscopy Age: 39-70 years old   Colonoscopy: every 10 years (May be performed more frequently if at higher risk)  OR  FOBT/FIT: every 1 year  OR  Cologuard: every 3 years  OR  Sigmoidoscopy: every 5 years  Screening may be recommended earlier than age 39 if at higher risk for colorectal cancer  Also, an individualized decision between you and your healthcare provider will decide whether screening between the ages of 74-80 would be appropriate   Colonoscopy: 05/04/2022  FOBT/FIT: Not on file  Cologuard: Not on file  Sigmoidoscopy: Not on file    Screening Current     Prostate Cancer Screening Individualized decision between patient and health care provider in men between ages of 53-78   Medicare will cover every 12 months beginning on the day after your 50th birthday PSA: 6 2 ng/mL     Screening Current     Hepatitis C Screening Once for adults born between 1945 and 1965  More frequently in patients at high risk for Hepatitis C Hep C Antibody: 12/31/2018    Screening Current   Diabetes Screening 1-2 times per year if you're at risk for diabetes or have pre-diabetes Fasting glucose: 101 mg/dL (11/8/2022)  A1C: 5 1 (9/25/2019)  Screening Current   Cholesterol Screening Once every 5 years if you don't have a lipid disorder  May order more often based on risk factors  Lipid panel: 11/08/2022  Screening Not Indicated  History Lipid Disorder      Other Preventive Screenings Covered by Medicare:  1  Abdominal Aortic Aneurysm (AAA) Screening: covered once if your at risk  You're considered to be at risk if you have a family history of AAA or a male between the age of 73-68 who smoking at least 100 cigarettes in your lifetime  2  Lung Cancer Screening: covers low dose CT scan once per year if you meet all of the following conditions: (1) Age 50-69; (2) No signs or symptoms of lung cancer; (3) Current smoker or have quit smoking within the last 15 years; (4) You have a tobacco smoking history of at least 20 pack years (packs per day x number of years you smoked); (5) You get a written order from a healthcare provider  3  Glaucoma Screening: covered annually if you're considered high risk: (1) You have diabetes OR (2) Family history of glaucoma OR (3)  aged 48 and older OR (3)  American aged 72 and older  3  Osteoporosis Screening: covered every 2 years if you meet one of the following conditions: (1) Have a vertebral abnormality; (2) On glucocorticoid therapy for more than 3 months; (3) Have primary hyperparathyroidism; (4) On osteoporosis medications and need to assess response to drug therapy  5  HIV Screening: covered annually if you're between the age of 12-76  Also covered annually if you are younger than 13 and older than 72 with risk factors for HIV infection  For pregnant patients, it is covered up to 3 times per pregnancy      Immunizations:  Immunization Recommendations   Influenza Vaccine Annual influenza vaccination during flu season is recommended for all persons aged >= 6 months who do not have contraindications   Pneumococcal Vaccine   * Pneumococcal conjugate vaccine = PCV13 (Prevnar 13), PCV15 (Vaxneuvance), PCV20 (Prevnar 20)  * Pneumococcal polysaccharide vaccine = PPSV23 (Pneumovax) Adults 2364 years old: 1-3 doses may be recommended based on certain risk factors  Adults 72 years old: 1-2 doses may be recommended based off what pneumonia vaccine you previously received   Hepatitis B Vaccine 3 dose series if at intermediate or high risk (ex: diabetes, end stage renal disease, liver disease)   Tetanus (Td) Vaccine - COST NOT COVERED BY MEDICARE PART B Following completion of primary series, a booster dose should be given every 10 years to maintain immunity against tetanus  Td may also be given as tetanus wound prophylaxis  Tdap Vaccine - COST NOT COVERED BY MEDICARE PART B Recommended at least once for all adults  For pregnant patients, recommended with each pregnancy  Shingles Vaccine (Shingrix) - COST NOT COVERED BY MEDICARE PART B  2 shot series recommended in those aged 48 and above     Health Maintenance Due:      Topic Date Due   • Colorectal Cancer Screening  05/04/2027   • Hepatitis C Screening  Completed     Immunizations Due:      Topic Date Due   • Hepatitis B Vaccine (1 of 3 - 3-dose series) Never done     Advance Directives   What are advance directives? Advance directives are legal documents that state your wishes and plans for medical care  These plans are made ahead of time in case you lose your ability to make decisions for yourself  Advance directives can apply to any medical decision, such as the treatments you want, and if you want to donate organs  What are the types of advance directives? There are many types of advance directives, and each state has rules about how to use them  You may choose a combination of any of the following:  · Living will: This is a written record of the treatment you want  You can also choose which treatments you do not want, which to limit, and which to stop at a certain time  This includes surgery, medicine, IV fluid, and tube feedings     · Durable power of  for healthcare [facility]): This is a written record that states who you want to make healthcare choices for you when you are unable to make them for yourself  This person, called a proxy, is usually a family member or a friend  You may choose more than 1 proxy  · Do not resuscitate (DNR) order:  A DNR order is used in case your heart stops beating or you stop breathing  It is a request not to have certain forms of treatment, such as CPR  A DNR order may be included in other types of advance directives  · Medical directive: This covers the care that you want if you are in a coma, near death, or unable to make decisions for yourself  You can list the treatments you want for each condition  Treatment may include pain medicine, surgery, blood transfusions, dialysis, IV or tube feedings, and a ventilator (breathing machine)  · Values history: This document has questions about your views, beliefs, and how you feel and think about life  This information can help others choose the care that you would choose  Why are advance directives important? An advance directive helps you control your care  Although spoken wishes may be used, it is better to have your wishes written down  Spoken wishes can be misunderstood, or not followed  Treatments may be given even if you do not want them  An advance directive may make it easier for your family to make difficult choices about your care  Weight Management   Why it is important to manage your weight:  Being overweight increases your risk of health conditions such as heart disease, high blood pressure, type 2 diabetes, and certain types of cancer  It can also increase your risk for osteoarthritis, sleep apnea, and other respiratory problems  Aim for a slow, steady weight loss  Even a small amount of weight loss can lower your risk of health problems  How to lose weight safely:  A safe and healthy way to lose weight is to eat fewer calories and get regular exercise   You can lose up about 1 pound a week by decreasing the number of calories you eat by 500 calories each day  Healthy meal plan for weight management:  A healthy meal plan includes a variety of foods, contains fewer calories, and helps you stay healthy  A healthy meal plan includes the following:  · Eat whole-grain foods more often  A healthy meal plan should contain fiber  Fiber is the part of grains, fruits, and vegetables that is not broken down by your body  Whole-grain foods are healthy and provide extra fiber in your diet  Some examples of whole-grain foods are whole-wheat breads and pastas, oatmeal, brown rice, and bulgur  · Eat a variety of vegetables every day  Include dark, leafy greens such as spinach, kale, jordan greens, and mustard greens  Eat yellow and orange vegetables such as carrots, sweet potatoes, and winter squash  · Eat a variety of fruits every day  Choose fresh or canned fruit (canned in its own juice or light syrup) instead of juice  Fruit juice has very little or no fiber  · Eat low-fat dairy foods  Drink fat-free (skim) milk or 1% milk  Eat fat-free yogurt and low-fat cottage cheese  Try low-fat cheeses such as mozzarella and other reduced-fat cheeses  · Choose meat and other protein foods that are low in fat  Choose beans or other legumes such as split peas or lentils  Choose fish, skinless poultry (chicken or turkey), or lean cuts of red meat (beef or pork)  Before you cook meat or poultry, cut off any visible fat  · Use less fat and oil  Try baking foods instead of frying them  Add less fat, such as margarine, sour cream, regular salad dressing and mayonnaise to foods  Eat fewer high-fat foods  Some examples of high-fat foods include french fries, doughnuts, ice cream, and cakes  · Eat fewer sweets  Limit foods and drinks that are high in sugar  This includes candy, cookies, regular soda, and sweetened drinks  Exercise:  Exercise at least 30 minutes per day on most days of the week   Some examples of exercise include walking, biking, dancing, and swimming  You can also fit in more physical activity by taking the stairs instead of the elevator or parking farther away from stores  Ask your healthcare provider about the best exercise plan for you  © Copyright [employer] 2018 Information is for End User's use only and may not be sold, redistributed or otherwise used for commercial purposes   All illustrations and images included in CareNotes® are the copyrighted property of A D A M , [employer]  or [address] [place] [address]

## 2022-12-07 NOTE — PROGRESS NOTES
Assessment and Plan:     Problem List Items Addressed This Visit        Digestive    GERD (gastroesophageal reflux disease)    Relevant Orders    CBC and differential       Other    Hypercholesterolemia    Relevant Orders    Lipid panel    Comprehensive metabolic panel    Restless legs syndrome    Relevant Medications    clonazePAM (KlonoPIN) 0 25 MG disintegrating tablet    Numbness and tingling of both feet   Other Visit Diagnoses     Encounter for Medicare annual wellness exam    -  Primary    Relevant Orders    Lipid panel    TSH, 3rd generation with Free T4 reflex    Comprehensive metabolic panel    CBC and differential    Hypothyroidism, unspecified type        Relevant Orders    TSH, 3rd generation with Free T4 reflex      1  Encounter for Medicare annual wellness exam    - Lipid panel; Future  - TSH, 3rd generation with Free T4 reflex; Future  - Comprehensive metabolic panel; Future  - CBC and differential; Future    2  Numbness and tingling of both feet  - anxiety induced  EMG normal      3  Restless legs syndrome  Change clonopin to BID  Instead of QHS  - clonazePAM (KlonoPIN) 0 25 MG disintegrating tablet; Take 1 tablet (0 25 mg total) by mouth 2 (two) times a day  Dispense: 60 tablet; Refill: 0    4  Hypercholesterolemia    - Lipid panel; Future  - Comprehensive metabolic panel; Future    5  Gastroesophageal reflux disease with esophagitis, unspecified whether hemorrhage    - CBC and differential; Future    6  Hypothyroidism, unspecified type    - TSH, 3rd generation with Free T4 reflex; Future    BMI Counseling: Body mass index is 26 78 kg/m²  The BMI is above normal  Nutrition recommendations include encouraging healthy choices of fruits and vegetables, consuming healthier snacks, limiting drinks that contain sugar, moderation in carbohydrate intake, reducing intake of saturated and trans fat and reducing intake of cholesterol  Exercise recommendations include exercising 3-5 times per week   Rationale for BMI follow-up plan is due to patient being overweight or obese  Depression Screening and Follow-up Plan: Patient was screened for depression during today's encounter  They screened negative with a PHQ-2 score of 0  Preventive health issues were discussed with patient, and age appropriate screening tests were ordered as noted in patient's After Visit Summary  Personalized health advice and appropriate referrals for health education or preventive services given if needed, as noted in patient's After Visit Summary  History of Present Illness:     Patient presents for a Medicare Wellness Visit     Restless leg syndrome- taking clonopin QHS  Does have numbness and tingling in hands and feet  Recent LE EMG with normal results  Continues to have numbness and tingling, does get worse when anxious or stressed  Used to take clonopin BID  Not sure when this got changed  Willing to try taking 2 times a day to see if this helps with the numbness  Patient Care Team:  Betty Samuels as PCP - General (Family Medicine)     Review of Systems:     Review of Systems   Constitutional: Negative  Negative for fatigue and fever  Respiratory: Negative  Negative for shortness of breath and wheezing  Cardiovascular: Negative  Negative for chest pain and palpitations  Musculoskeletal: Negative  Negative for myalgias  Skin: Negative  Negative for rash  Neurological: Positive for numbness (hands and feet b/l)  Psychiatric/Behavioral: Negative  Negative for dysphoric mood  The patient is not nervous/anxious           Problem List:     Patient Active Problem List   Diagnosis   • GERD (gastroesophageal reflux disease)   • Hypercholesterolemia   • BPH (benign prostatic hyperplasia)   • Elevated PSA   • Hyperplastic colonic polyp   • Vertigo   • Restless legs syndrome   • Other specified hypothyroidism   • Numbness and tingling of both feet      Past Medical and Surgical History:     Past Medical History:   Diagnosis Date   • Benign neoplasm of colon    • Chronic rhinitis    • Colon polyp    • Diverticular disease of colon    • Dysphagia    • GERD (gastroesophageal reflux disease)    • Hyperlipidemia    • Internal hemorrhoids without complication    • PSA (psoriatic arthritis) (HCC)     elevated   • Restless leg syndrome    • Thyroid nodule      Past Surgical History:   Procedure Laterality Date   • COLONOSCOPY  09/22/2010   • COLONOSCOPY  06/13/2007   • COLONOSCOPY  05/27/2004   • COLONOSCOPY  12/09/2015   • GALLBLADDER SURGERY     • OTHER SURGICAL HISTORY  11/2012    abcessed tooth   • THYROIDECTOMY     • TONSILLECTOMY     • UPPER GASTROINTESTINAL ENDOSCOPY  10/30/2013      Family History:     Family History   Problem Relation Age of Onset   • Colon cancer Father    • Other Father         metastatic: lymph tissue, breast, thyroid, prostate, liver, lung   • Other Family         malignant neoplasm of gastrointestinal disease      Social History:     Social History     Socioeconomic History   • Marital status: /Civil Union     Spouse name: None   • Number of children: None   • Years of education: None   • Highest education level: None   Occupational History   • None   Tobacco Use   • Smoking status: Never   • Smokeless tobacco: Never   Vaping Use   • Vaping Use: Never used   Substance and Sexual Activity   • Alcohol use: No   • Drug use: No   • Sexual activity: None   Other Topics Concern   • None   Social History Narrative    Consumes on average 3 cups of regular coffee per day  Social Determinants of Health     Financial Resource Strain: Low Risk    • Difficulty of Paying Living Expenses: Not very hard   Food Insecurity: Not on file   Transportation Needs: No Transportation Needs   • Lack of Transportation (Medical): No   • Lack of Transportation (Non-Medical):  No   Physical Activity: Not on file   Stress: Not on file   Social Connections: Not on file   Intimate Partner Violence: Not on file Housing Stability: Not on file      Medications and Allergies:     Current Outpatient Medications   Medication Sig Dispense Refill   • aspirin 81 MG tablet Take 81 mg by mouth     • CALCIUM/MAGNESIUM/ZINC FORMULA PO Take 1 tablet by mouth daily     • clobetasol (TEMOVATE) 0 05 % ointment Apply topically as needed     • clonazePAM (KlonoPIN) 0 25 MG disintegrating tablet Take 1 tablet (0 25 mg total) by mouth 2 (two) times a day 60 tablet 0   • docusate sodium (QC Stool Softener) 100 mg capsule Take 1 capsule (100 mg total) by mouth daily 90 capsule 3   • dutasteride (AVODART) 0 5 mg capsule Take 0 5 mg by mouth daily     • GARLIC PO Take by mouth daily     • levothyroxine 75 mcg tablet Take 1 tablet (75 mcg total) by mouth daily 90 tablet 3   • Misc Natural Products (LUTEIN 20 PO) Take by mouth     • Multiple Vitamins-Minerals (LUTEIN-ZEAXANTHIN PO) Take 20 mg by mouth     • Multiple Vitamins-Minerals (MULTIVITAMIN PO) Take by mouth daily     • Omega-3 Fatty Acids (FISH OIL PO) Take by mouth daily     • omeprazole (PriLOSEC) 20 mg delayed release capsule Take 20 mg by mouth     • simvastatin (ZOCOR) 40 mg tablet Take 1 tablet (40 mg total) by mouth daily at bedtime 90 tablet 3   • tamsulosin (FLOMAX) 0 4 mg Take 2 capsules (0 8 mg total) by mouth daily with dinner 90 capsule 1     No current facility-administered medications for this visit       Allergies   Allergen Reactions   • Codeine       Immunizations:     Immunization History   Administered Date(s) Administered   • COVID-19 PFIZER VACCINE 0 3 ML IM 04/01/2021, 04/22/2021, 11/29/2021, 06/28/2022   • INFLUENZA 12/05/2017, 10/05/2022   • Influenza, high dose seasonal 0 7 mL 09/24/2018, 09/25/2019, 09/24/2020, 10/27/2021, 10/05/2022   • Pneumococcal Conjugate 13-Valent 12/19/2018   • Pneumococcal Polysaccharide PPV23 05/13/2020   • Tdap 12/19/2018   • Zoster 09/06/2016   • Zoster Vaccine Recombinant 08/24/2022      Health Maintenance:         Topic Date Due   • Colorectal Cancer Screening  05/04/2027   • Hepatitis C Screening  Completed         Topic Date Due   • Hepatitis B Vaccine (1 of 3 - 3-dose series) Never done      Medicare Screening Tests and Risk Assessments:     Marcos Rogers is here for his Subsequent Wellness visit  Last Medicare Wellness visit information reviewed, patient interviewed, no change since last AWV  Health Risk Assessment:   Patient rates overall health as good  Patient feels that their physical health rating is same  Patient is satisfied with their life  Eyesight was rated as same  Hearing was rated as same  Patient feels that their emotional and mental health rating is same  Patients states they are never, rarely angry  Patient states they are never, rarely unusually tired/fatigued  Pain experienced in the last 7 days has been none  Patient states that he has experienced no weight loss or gain in last 6 months  Depression Screening:   PHQ-2 Score: 0      Fall Risk Screening: In the past year, patient has experienced: no history of falling in past year      Home Safety:  Patient does not have trouble with stairs inside or outside of their home  Patient has working smoke alarms and has working carbon monoxide detector  Home safety hazards include: none  Nutrition:   Current diet is Regular  Low carb and salt  Medications:   Patient is currently taking over-the-counter supplements  OTC medications include: see medication list  Patient is able to manage medications  Activities of Daily Living (ADLs)/Instrumental Activities of Daily Living (IADLs):   Walk and transfer into and out of bed and chair?: Yes  Dress and groom yourself?: Yes    Bathe or shower yourself?: Yes    Feed yourself?  Yes  Do your laundry/housekeeping?: Yes  Manage your money, pay your bills and track your expenses?: Yes  Make your own meals?: Yes    Do your own shopping?: Yes    Previous Hospitalizations:   Any hospitalizations or ED visits within the last 12 months?: No      Advance Care Planning:   Living will: Yes    Durable POA for healthcare: Yes    Advanced directive: Yes    Advanced directive counseling given: Yes    Five wishes given: No      Cognitive Screening:   Provider or family/friend/caregiver concerned regarding cognition?: No    PREVENTIVE SCREENINGS      Cardiovascular Screening:    General: Screening Not Indicated, History Lipid Disorder and Screening Current      Diabetes Screening:     General: Screening Current      Colorectal Cancer Screening:     General: Screening Current      Prostate Cancer Screening:    General: Screening Current      Osteoporosis Screening:    General: Screening Not Indicated      Abdominal Aortic Aneurysm (AAA) Screening:    Risk factors include: age between 73-69 yo        Lung Cancer Screening:     General: Screening Not Indicated      Hepatitis C Screening:    General: Screening Current    Screening, Brief Intervention, and Referral to Treatment (SBIRT)    Screening  Typical number of drinks in a day: 0  Typical number of drinks in a week: 0  Interpretation: Low risk drinking behavior  Brief Intervention  Alcohol & drug use screenings were reviewed  No concerns regarding substance use disorder identified  Other Counseling Topics:   Car/seat belt/driving safety, sunscreen and regular weightbearing exercise and calcium and vitamin D intake  No results found  Physical Exam:     /68   Pulse 84   Temp 98 2 °F (36 8 °C)   Ht 5' 2" (1 575 m)   Wt 66 4 kg (146 lb 6 4 oz)   SpO2 98%   BMI 26 78 kg/m²     Physical Exam  Vitals and nursing note reviewed  Constitutional:       General: He is not in acute distress  Appearance: He is well-developed  He is not diaphoretic  Cardiovascular:      Rate and Rhythm: Normal rate and regular rhythm  Heart sounds: Normal heart sounds  No murmur heard  No friction rub  Pulmonary:      Effort: Pulmonary effort is normal  No respiratory distress        Breath sounds: Normal breath sounds  No wheezing or rales  Abdominal:      Palpations: Abdomen is soft  Musculoskeletal:         General: Normal range of motion  Skin:     General: Skin is warm and dry  Capillary Refill: Capillary refill takes less than 2 seconds  Findings: No erythema or rash  Neurological:      Mental Status: He is alert  Psychiatric:         Behavior: Behavior normal  Behavior is cooperative  Thought Content:  Thought content normal           MARGARITA Irby

## 2023-01-18 DIAGNOSIS — G25.81 RESTLESS LEGS SYNDROME: ICD-10-CM

## 2023-01-18 RX ORDER — CLONAZEPAM 0.25 MG/1
0.25 TABLET, ORALLY DISINTEGRATING ORAL 2 TIMES DAILY
Qty: 60 TABLET | Refills: 0 | Status: SHIPPED | OUTPATIENT
Start: 2023-01-18

## 2023-01-18 NOTE — TELEPHONE ENCOUNTER
Patient requesting refill(s) of: clonazepam     Last filled: 12/7/22  Last appt: 12/7/22  Next appt: 12/13/23  Pharmacy: [facility] (formerly [facility]3]

## 2023-02-07 ENCOUNTER — APPOINTMENT (OUTPATIENT)
Dept: LAB | Facility: CLINIC | Age: 73
End: 2023-02-07

## 2023-02-07 DIAGNOSIS — R97.20 ELEVATED PROSTATE SPECIFIC ANTIGEN (PSA): ICD-10-CM

## 2023-02-08 LAB
PSA FREE MFR SERPL: 23.8 %
PSA FREE SERPL-MCNC: 1.5 NG/ML
PSA SERPL-MCNC: 6.3 NG/ML (ref 0–4)

## 2023-02-16 DIAGNOSIS — G25.81 RESTLESS LEGS SYNDROME: ICD-10-CM

## 2023-02-16 RX ORDER — CLONAZEPAM 0.25 MG/1
0.25 TABLET, ORALLY DISINTEGRATING ORAL 2 TIMES DAILY
Qty: 60 TABLET | Refills: 0 | Status: SHIPPED | OUTPATIENT
Start: 2023-02-16

## 2023-02-16 NOTE — TELEPHONE ENCOUNTER
Patient requesting refill(s) of:  Klonopin 0 25mg  Last filled:1/18/23  Last appt:12/7/22  Next appt:3/22/23  Pharmacy:[facility]

## 2023-03-10 ENCOUNTER — APPOINTMENT (OUTPATIENT)
Dept: LAB | Facility: CLINIC | Age: 73
End: 2023-03-10

## 2023-03-10 DIAGNOSIS — K21.00 GASTROESOPHAGEAL REFLUX DISEASE WITH ESOPHAGITIS, UNSPECIFIED WHETHER HEMORRHAGE: ICD-10-CM

## 2023-03-10 DIAGNOSIS — E03.9 HYPOTHYROIDISM, UNSPECIFIED TYPE: ICD-10-CM

## 2023-03-10 DIAGNOSIS — Z00.00 ENCOUNTER FOR MEDICARE ANNUAL WELLNESS EXAM: ICD-10-CM

## 2023-03-10 DIAGNOSIS — E78.00 HYPERCHOLESTEROLEMIA: ICD-10-CM

## 2023-03-10 LAB
ALBUMIN SERPL BCP-MCNC: 4 G/DL (ref 3.5–5)
ALP SERPL-CCNC: 43 U/L (ref 46–116)
ALT SERPL W P-5'-P-CCNC: 27 U/L (ref 12–78)
ANION GAP SERPL CALCULATED.3IONS-SCNC: 8 MMOL/L (ref 4–13)
AST SERPL W P-5'-P-CCNC: 22 U/L (ref 5–45)
BASOPHILS # BLD AUTO: 0.04 THOUSANDS/ÂΜL (ref 0–0.1)
BASOPHILS NFR BLD AUTO: 1 % (ref 0–1)
BILIRUB SERPL-MCNC: 0.63 MG/DL (ref 0.2–1)
BUN SERPL-MCNC: 16 MG/DL (ref 5–25)
CALCIUM SERPL-MCNC: 9.3 MG/DL (ref 8.3–10.1)
CHLORIDE SERPL-SCNC: 108 MMOL/L (ref 96–108)
CHOLEST SERPL-MCNC: 127 MG/DL
CO2 SERPL-SCNC: 24 MMOL/L (ref 21–32)
CREAT SERPL-MCNC: 0.84 MG/DL (ref 0.6–1.3)
EOSINOPHIL # BLD AUTO: 0.11 THOUSAND/ÂΜL (ref 0–0.61)
EOSINOPHIL NFR BLD AUTO: 2 % (ref 0–6)
ERYTHROCYTE [DISTWIDTH] IN BLOOD BY AUTOMATED COUNT: 12.2 % (ref 11.6–15.1)
GFR SERPL CREATININE-BSD FRML MDRD: 88 ML/MIN/1.73SQ M
GLUCOSE P FAST SERPL-MCNC: 104 MG/DL (ref 65–99)
HCT VFR BLD AUTO: 46.2 % (ref 36.5–49.3)
HDLC SERPL-MCNC: 45 MG/DL
HGB BLD-MCNC: 15.5 G/DL (ref 12–17)
IMM GRANULOCYTES # BLD AUTO: 0.04 THOUSAND/UL (ref 0–0.2)
IMM GRANULOCYTES NFR BLD AUTO: 1 % (ref 0–2)
LDLC SERPL CALC-MCNC: 68 MG/DL (ref 0–100)
LYMPHOCYTES # BLD AUTO: 1.58 THOUSANDS/ÂΜL (ref 0.6–4.47)
LYMPHOCYTES NFR BLD AUTO: 27 % (ref 14–44)
MCH RBC QN AUTO: 31.7 PG (ref 26.8–34.3)
MCHC RBC AUTO-ENTMCNC: 33.5 G/DL (ref 31.4–37.4)
MCV RBC AUTO: 95 FL (ref 82–98)
MONOCYTES # BLD AUTO: 0.51 THOUSAND/ÂΜL (ref 0.17–1.22)
MONOCYTES NFR BLD AUTO: 9 % (ref 4–12)
NEUTROPHILS # BLD AUTO: 3.55 THOUSANDS/ÂΜL (ref 1.85–7.62)
NEUTS SEG NFR BLD AUTO: 60 % (ref 43–75)
NONHDLC SERPL-MCNC: 82 MG/DL
NRBC BLD AUTO-RTO: 0 /100 WBCS
PLATELET # BLD AUTO: 162 THOUSANDS/UL (ref 149–390)
PMV BLD AUTO: 9.9 FL (ref 8.9–12.7)
POTASSIUM SERPL-SCNC: 4.1 MMOL/L (ref 3.5–5.3)
PROT SERPL-MCNC: 6.8 G/DL (ref 6.4–8.4)
RBC # BLD AUTO: 4.89 MILLION/UL (ref 3.88–5.62)
SODIUM SERPL-SCNC: 140 MMOL/L (ref 135–147)
TRIGL SERPL-MCNC: 72 MG/DL
TSH SERPL DL<=0.05 MIU/L-ACNC: 1.74 UIU/ML (ref 0.45–4.5)
WBC # BLD AUTO: 5.83 THOUSAND/UL (ref 4.31–10.16)

## 2023-03-15 ENCOUNTER — RA CDI HCC (OUTPATIENT)
Dept: OTHER | Facility: HOSPITAL | Age: 73
End: 2023-03-15

## 2023-03-15 DIAGNOSIS — G25.81 RESTLESS LEGS SYNDROME: ICD-10-CM

## 2023-03-15 RX ORDER — CLONAZEPAM 0.25 MG/1
0.25 TABLET, ORALLY DISINTEGRATING ORAL 2 TIMES DAILY
Qty: 60 TABLET | Refills: 0 | Status: SHIPPED | OUTPATIENT
Start: 2023-03-15

## 2023-03-15 NOTE — TELEPHONE ENCOUNTER
Patient requesting refill(s) of: Klonopin 0 25 mg BID    Last filled: 2/16/2023 #60 x 0  Last appt: 12/7/2022  Next appt: 3/22/2023  Pharmacy: [facility]

## 2023-03-22 ENCOUNTER — OFFICE VISIT (OUTPATIENT)
Dept: FAMILY MEDICINE CLINIC | Facility: CLINIC | Age: 73
End: 2023-03-22

## 2023-03-22 VITALS
OXYGEN SATURATION: 98 % | DIASTOLIC BLOOD PRESSURE: 68 MMHG | BODY MASS INDEX: 27.23 KG/M2 | TEMPERATURE: 97.6 F | HEIGHT: 62 IN | HEART RATE: 78 BPM | SYSTOLIC BLOOD PRESSURE: 104 MMHG | WEIGHT: 148 LBS | RESPIRATION RATE: 17 BRPM

## 2023-03-22 DIAGNOSIS — E78.5 HYPERLIPIDEMIA, UNSPECIFIED HYPERLIPIDEMIA TYPE: ICD-10-CM

## 2023-03-22 DIAGNOSIS — K21.00 GASTROESOPHAGEAL REFLUX DISEASE WITH ESOPHAGITIS, UNSPECIFIED WHETHER HEMORRHAGE: ICD-10-CM

## 2023-03-22 DIAGNOSIS — E78.00 HYPERCHOLESTEROLEMIA: Primary | ICD-10-CM

## 2023-03-22 DIAGNOSIS — E03.8 OTHER SPECIFIED HYPOTHYROIDISM: ICD-10-CM

## 2023-03-22 RX ORDER — FINASTERIDE 5 MG/1
5 TABLET, FILM COATED ORAL DAILY
COMMUNITY
Start: 2023-02-15

## 2023-03-22 NOTE — PROGRESS NOTES
[facility]        NAME: Quan De Santiago is a 81 y o  male  : 1950    MRN: [id number]  DATE: 2023  TIME: 9:16 AM    Assessment and Plan   Hypercholesterolemia [E78 00]  1  Hypercholesterolemia  Comprehensive metabolic panel    Lipid panel      2  Other specified hypothyroidism  TSH, 3rd generation with Free T4 reflex      3  Gastroesophageal reflux disease with esophagitis, unspecified whether hemorrhage  CBC and differential      4  Hyperlipidemia, unspecified hyperlipidemia type        1  Hypercholesterolemia  Well controlled  - Comprehensive metabolic panel; Future  - Lipid panel; Future    2  Other specified hypothyroidism  -stable  - TSH, 3rd generation with Free T4 reflex; Future    3  Gastroesophageal reflux disease with esophagitis, unspecified whether hemorrhage    - CBC and differential; Future    4  Hyperlipidemia, unspecified hyperlipidemia type          Patient Instructions     There are no Patient Instructions on file for this visit  Chief Complaint     Chief Complaint   Patient presents with   • Follow-up         History of Present Illness       Patient here for routine follow up visit  Was to the dermatologist, had mole removed  Basal cell skin cancer removed  [facility]  Dr buck is dermatologist  Dr Isha Eldridge  Had perla procedure  GERD- controlled  No recent flares  PSA elevated at 6 3 has repeat test in May and follow up with urology  On finasteride 5mg  Klonopin working well for RLS  Refilled last week  Cholesterol levels are normal            Review of Systems   Review of Systems   Constitutional: Negative  Negative for fatigue  Respiratory: Negative  Negative for shortness of breath and wheezing  Cardiovascular: Negative  Negative for chest pain and palpitations  Gastrointestinal: Negative  Negative for abdominal pain  Neurological: Negative    Negative for dizziness, light-headedness and headaches  Psychiatric/Behavioral: Negative          PHQ-2/9 Depression Screening    Little interest or pleasure in doing things: 0 - not at all  Feeling down, depressed, or hopeless: 0 - not at all  PHQ-2 Score: 0  PHQ-2 Interpretation: Negative depression screen        Current Medications       Current Outpatient Medications:   •  aspirin 81 MG tablet, Take 81 mg by mouth, Disp: , Rfl:   •  CALCIUM/MAGNESIUM/ZINC FORMULA PO, Take 1 tablet by mouth daily, Disp: , Rfl:   •  clobetasol (TEMOVATE) 0 05 % ointment, Apply topically as needed, Disp: , Rfl:   •  clonazePAM (KlonoPIN) 0 25 MG disintegrating tablet, Take 1 tablet (0 25 mg total) by mouth 2 (two) times a day, Disp: 60 tablet, Rfl: 0  •  docusate sodium (QC Stool Softener) 100 mg capsule, Take 1 capsule (100 mg total) by mouth daily, Disp: 90 capsule, Rfl: 3  •  finasteride (PROSCAR) 5 mg tablet, Take 5 mg by mouth daily, Disp: , Rfl:   •  GARLIC PO, Take by mouth daily, Disp: , Rfl:   •  levothyroxine 75 mcg tablet, Take 1 tablet (75 mcg total) by mouth daily, Disp: 90 tablet, Rfl: 3  •  Misc Natural Products (LUTEIN 20 PO), Take by mouth, Disp: , Rfl:   •  Multiple Vitamins-Minerals (LUTEIN-ZEAXANTHIN PO), Take 20 mg by mouth, Disp: , Rfl:   •  Multiple Vitamins-Minerals (MULTIVITAMIN PO), Take by mouth daily, Disp: , Rfl:   •  Omega-3 Fatty Acids (FISH OIL PO), Take by mouth daily, Disp: , Rfl:   •  omeprazole (PriLOSEC) 20 mg delayed release capsule, Take 20 mg by mouth, Disp: , Rfl:   •  simvastatin (ZOCOR) 40 mg tablet, Take 1 tablet (40 mg total) by mouth daily at bedtime, Disp: 90 tablet, Rfl: 3  •  tamsulosin (FLOMAX) 0 4 mg, Take 2 capsules (0 8 mg total) by mouth daily with dinner, Disp: 90 capsule, Rfl: 1    Current Allergies     Allergies as of 03/22/2023 - Reviewed 03/22/2023   Allergen Reaction Noted   • Codeine  08/07/2018            The following portions of the patient's history were reviewed and updated as appropriate: allergies, current medications, past family history, past medical history, past social history, past surgical history and problem list      Past Medical History:   Diagnosis Date   • Benign neoplasm of colon    • Chronic rhinitis    • Colon polyp    • Diverticular disease of colon    • Dysphagia    • GERD (gastroesophageal reflux disease)    • Hyperlipidemia    • Internal hemorrhoids without complication    • PSA (psoriatic arthritis) ([facility] [address] )     elevated   • Restless leg syndrome    • Thyroid nodule        Past Surgical History:   Procedure Laterality Date   • COLONOSCOPY  09/22/2010   • COLONOSCOPY  06/13/2007   • COLONOSCOPY  05/27/2004   • COLONOSCOPY  12/09/2015   • GALLBLADDER SURGERY     • OTHER SURGICAL HISTORY  11/2012    abcessed tooth   • THYROIDECTOMY     • TONSILLECTOMY     • UPPER GASTROINTESTINAL ENDOSCOPY  10/30/2013       Family History   Problem Relation Age of Onset   • Colon cancer Father    • Other Father         metastatic: lymph tissue, breast, thyroid, prostate, liver, lung   • Other Family         malignant neoplasm of gastrointestinal disease         Medications have been verified  Objective   /68   Pulse 78   Temp 97 6 °F (36 4 °C)   Resp 17   Ht 5' 2" (1 575 m)   Wt 67 1 kg (148 lb)   SpO2 98%   BMI 27 07 kg/m²        Physical Exam     Physical Exam  Vitals and nursing note reviewed  Constitutional:       General: He is not in acute distress  Appearance: Normal appearance  He is well-developed  He is not ill-appearing  Eyes:      General: Lids are normal    Cardiovascular:      Rate and Rhythm: Normal rate and regular rhythm  Heart sounds: Normal heart sounds, S1 normal and S2 normal  No murmur heard  Pulmonary:      Effort: Pulmonary effort is normal  No respiratory distress  Breath sounds: Normal breath sounds  No decreased breath sounds or wheezing  Musculoskeletal:         General: No tenderness or deformity  Normal range of motion     Skin:     General: Skin is warm       Findings: No erythema or rash  Neurological:      Mental Status: He is alert and oriented to person, place, and time  Psychiatric:         Behavior: Behavior normal  Behavior is cooperative  Thought Content:  Thought content normal

## 2023-03-28 ENCOUNTER — CLINICAL SUPPORT (OUTPATIENT)
Dept: FAMILY MEDICINE CLINIC | Facility: CLINIC | Age: 73
End: 2023-03-28

## 2023-03-28 DIAGNOSIS — Z23 ENCOUNTER FOR IMMUNIZATION: Primary | ICD-10-CM

## 2023-05-09 ENCOUNTER — APPOINTMENT (OUTPATIENT)
Dept: LAB | Facility: CLINIC | Age: 73
End: 2023-05-09

## 2023-05-09 DIAGNOSIS — R97.20 ELEVATED PROSTATE SPECIFIC ANTIGEN (PSA): ICD-10-CM

## 2023-05-09 DIAGNOSIS — E78.00 HYPERCHOLESTEROLEMIA: ICD-10-CM

## 2023-05-09 DIAGNOSIS — E03.8 OTHER SPECIFIED HYPOTHYROIDISM: ICD-10-CM

## 2023-05-09 DIAGNOSIS — K21.00 GASTROESOPHAGEAL REFLUX DISEASE WITH ESOPHAGITIS, UNSPECIFIED WHETHER HEMORRHAGE: ICD-10-CM

## 2023-05-10 LAB
PSA FREE MFR SERPL: 25.8 %
PSA FREE SERPL-MCNC: 1.29 NG/ML
PSA SERPL-MCNC: 5 NG/ML (ref 0–4)

## 2023-05-15 DIAGNOSIS — G25.81 RESTLESS LEGS SYNDROME: ICD-10-CM

## 2023-05-15 RX ORDER — CLONAZEPAM 0.25 MG/1
0.25 TABLET, ORALLY DISINTEGRATING ORAL 2 TIMES DAILY
Qty: 60 TABLET | Refills: 0 | Status: SHIPPED | OUTPATIENT
Start: 2023-05-15

## 2023-05-15 NOTE — TELEPHONE ENCOUNTER
Patient requesting refill(s) of: clonazepam     Last filled: 3/22/2023  Last appt: 12/13/2023  Next appt: 4/18/2023  Pharmacy:     [facility]

## 2023-05-31 ENCOUNTER — APPOINTMENT (OUTPATIENT)
Dept: LAB | Facility: CLINIC | Age: 73
End: 2023-05-31
Payer: MEDICARE

## 2023-05-31 LAB
ALBUMIN SERPL BCP-MCNC: 3.6 G/DL (ref 3.5–5)
ALP SERPL-CCNC: 43 U/L (ref 46–116)
ALT SERPL W P-5'-P-CCNC: 30 U/L (ref 12–78)
ANION GAP SERPL CALCULATED.3IONS-SCNC: 0 MMOL/L (ref 4–13)
AST SERPL W P-5'-P-CCNC: 18 U/L (ref 5–45)
BASOPHILS # BLD AUTO: 0.07 THOUSANDS/ÂΜL (ref 0–0.1)
BASOPHILS NFR BLD AUTO: 1 % (ref 0–1)
BILIRUB SERPL-MCNC: 0.38 MG/DL (ref 0.2–1)
BUN SERPL-MCNC: 15 MG/DL (ref 5–25)
CALCIUM SERPL-MCNC: 8.7 MG/DL (ref 8.3–10.1)
CHLORIDE SERPL-SCNC: 110 MMOL/L (ref 96–108)
CHOLEST SERPL-MCNC: 130 MG/DL
CO2 SERPL-SCNC: 26 MMOL/L (ref 21–32)
CREAT SERPL-MCNC: 0.85 MG/DL (ref 0.6–1.3)
EOSINOPHIL # BLD AUTO: 0.17 THOUSAND/ÂΜL (ref 0–0.61)
EOSINOPHIL NFR BLD AUTO: 3 % (ref 0–6)
ERYTHROCYTE [DISTWIDTH] IN BLOOD BY AUTOMATED COUNT: 12.3 % (ref 11.6–15.1)
GFR SERPL CREATININE-BSD FRML MDRD: 87 ML/MIN/1.73SQ M
GLUCOSE P FAST SERPL-MCNC: 108 MG/DL (ref 65–99)
HCT VFR BLD AUTO: 45.3 % (ref 36.5–49.3)
HDLC SERPL-MCNC: 51 MG/DL
HGB BLD-MCNC: 15.2 G/DL (ref 12–17)
IMM GRANULOCYTES # BLD AUTO: 0.04 THOUSAND/UL (ref 0–0.2)
IMM GRANULOCYTES NFR BLD AUTO: 1 % (ref 0–2)
LDLC SERPL CALC-MCNC: 67 MG/DL (ref 0–100)
LYMPHOCYTES # BLD AUTO: 1.52 THOUSANDS/ÂΜL (ref 0.6–4.47)
LYMPHOCYTES NFR BLD AUTO: 25 % (ref 14–44)
MCH RBC QN AUTO: 31.7 PG (ref 26.8–34.3)
MCHC RBC AUTO-ENTMCNC: 33.6 G/DL (ref 31.4–37.4)
MCV RBC AUTO: 94 FL (ref 82–98)
MONOCYTES # BLD AUTO: 0.52 THOUSAND/ÂΜL (ref 0.17–1.22)
MONOCYTES NFR BLD AUTO: 9 % (ref 4–12)
NEUTROPHILS # BLD AUTO: 3.77 THOUSANDS/ÂΜL (ref 1.85–7.62)
NEUTS SEG NFR BLD AUTO: 61 % (ref 43–75)
NONHDLC SERPL-MCNC: 79 MG/DL
NRBC BLD AUTO-RTO: 0 /100 WBCS
PLATELET # BLD AUTO: 153 THOUSANDS/UL (ref 149–390)
PMV BLD AUTO: 9.8 FL (ref 8.9–12.7)
POTASSIUM SERPL-SCNC: 4.1 MMOL/L (ref 3.5–5.3)
PROT SERPL-MCNC: 6.8 G/DL (ref 6.4–8.4)
RBC # BLD AUTO: 4.8 MILLION/UL (ref 3.88–5.62)
SODIUM SERPL-SCNC: 136 MMOL/L (ref 135–147)
TRIGL SERPL-MCNC: 61 MG/DL
TSH SERPL DL<=0.05 MIU/L-ACNC: 2.17 UIU/ML (ref 0.45–4.5)
WBC # BLD AUTO: 6.09 THOUSAND/UL (ref 4.31–10.16)

## 2023-05-31 PROCEDURE — 80053 COMPREHEN METABOLIC PANEL: CPT

## 2023-05-31 PROCEDURE — 80061 LIPID PANEL: CPT

## 2023-05-31 PROCEDURE — 84443 ASSAY THYROID STIM HORMONE: CPT

## 2023-05-31 PROCEDURE — 85025 COMPLETE CBC W/AUTO DIFF WBC: CPT

## 2023-06-07 ENCOUNTER — HOSPITAL ENCOUNTER (OUTPATIENT)
Facility: MEDICAL CENTER | Age: 73
Discharge: HOME | End: 2023-06-07
Payer: MEDICARE

## 2023-06-07 DIAGNOSIS — R97.20 ELEVATED PROSTATE SPECIFIC ANTIGEN (PSA): ICD-10-CM

## 2023-06-07 PROCEDURE — 72197 MRI PELVIS W/O & W/DYE: CPT

## 2023-06-07 PROCEDURE — G1004 CDSM NDSC: HCPCS

## 2023-06-07 PROCEDURE — A9585 GADOBUTROL INJECTION: HCPCS | Performed by: UROLOGY

## 2023-06-07 PROCEDURE — 76377 3D RENDER W/INTRP POSTPROCES: CPT

## 2023-06-07 RX ADMIN — GADOBUTROL 6 ML: 604.72 INJECTION INTRAVENOUS at 09:43

## 2023-06-16 DIAGNOSIS — G25.81 RESTLESS LEGS SYNDROME: ICD-10-CM

## 2023-06-16 NOTE — TELEPHONE ENCOUNTER
Patient requesting refill(s) of: [facility]     Last filled: 5/15/2023  Last appt: 3/22/2023  Next appt: 9/27/2023  Pharmacy:    [facility]

## 2023-06-19 RX ORDER — CLONAZEPAM 0.25 MG/1
0.25 TABLET, ORALLY DISINTEGRATING ORAL 2 TIMES DAILY
Qty: 60 TABLET | Refills: 0 | Status: SHIPPED | OUTPATIENT
Start: 2023-06-19

## 2023-07-18 DIAGNOSIS — N40.0 BENIGN PROSTATIC HYPERPLASIA WITHOUT LOWER URINARY TRACT SYMPTOMS: ICD-10-CM

## 2023-07-18 DIAGNOSIS — G25.81 RESTLESS LEGS SYNDROME: ICD-10-CM

## 2023-07-18 RX ORDER — CLONAZEPAM 0.25 MG/1
0.25 TABLET, ORALLY DISINTEGRATING ORAL 2 TIMES DAILY
Qty: 60 TABLET | Refills: 0 | Status: SHIPPED | OUTPATIENT
Start: 2023-07-18

## 2023-07-18 RX ORDER — TAMSULOSIN HYDROCHLORIDE 0.4 MG/1
0.8 CAPSULE ORAL
Qty: 90 CAPSULE | Refills: 0 | Status: SHIPPED | OUTPATIENT
Start: 2023-07-18

## 2023-07-18 NOTE — TELEPHONE ENCOUNTER
Patient requesting refill(s) of: Klonopin 0.25mg and Flomax 0.4mg    Klonopin Last filled: 6/19/23 #60x0  Flomax last filled: 9/20/22 #90x1  Last appt: 3/22/23  Next appt: 9/27/23  Pharmacy: [facility]

## 2023-08-12 DIAGNOSIS — G25.81 RESTLESS LEGS SYNDROME: ICD-10-CM

## 2023-08-14 RX ORDER — CLONAZEPAM 0.25 MG/1
0.25 TABLET, ORALLY DISINTEGRATING ORAL 2 TIMES DAILY
Qty: 60 TABLET | Refills: 0 | Status: SHIPPED | OUTPATIENT
Start: 2023-08-14 | End: 2023-09-15 | Stop reason: SDUPTHER

## 2023-08-14 NOTE — TELEPHONE ENCOUNTER
Patient requesting refill(s) of:  clonazePAM (KlonoPIN) 0.25 MG disintegrating tablet  Last filled:7/18/23  Last appt:3/22/23  Next appt:9/27/23  Pharmacy: [address] [place] [address] [address] [address]

## 2023-08-30 DIAGNOSIS — N40.0 BENIGN PROSTATIC HYPERPLASIA WITHOUT LOWER URINARY TRACT SYMPTOMS: ICD-10-CM

## 2023-08-30 RX ORDER — TAMSULOSIN HYDROCHLORIDE 0.4 MG/1
0.8 CAPSULE ORAL
Qty: 90 CAPSULE | Refills: 0 | Status: SHIPPED | OUTPATIENT
Start: 2023-08-30

## 2023-08-30 NOTE — TELEPHONE ENCOUNTER
Patient requesting refill(s) of: flomax 0.4mg    Last filled: 7/18/23 #90 x0  Last appt: 3/22/23  Next appt: 9/27/23  Pharmacy: [facility]

## 2023-09-15 DIAGNOSIS — G25.81 RESTLESS LEGS SYNDROME: ICD-10-CM

## 2023-09-15 RX ORDER — CLONAZEPAM 0.25 MG/1
0.25 TABLET, ORALLY DISINTEGRATING ORAL 2 TIMES DAILY
Qty: 60 TABLET | Refills: 0 | Status: SHIPPED | OUTPATIENT
Start: 2023-09-15

## 2023-09-15 NOTE — TELEPHONE ENCOUNTER
Patient requesting refill(s) of: Klonopin 0.25mg    Last filled: 8/14/23 #60x0  Last appt: 3/22/23  Next appt: 9/27/23  Pharmacy: [facility]

## 2023-09-27 ENCOUNTER — OFFICE VISIT (OUTPATIENT)
Dept: FAMILY MEDICINE CLINIC | Facility: CLINIC | Age: 73
End: 2023-09-27
Payer: MEDICARE

## 2023-09-27 VITALS
HEIGHT: 62 IN | TEMPERATURE: 98.3 F | SYSTOLIC BLOOD PRESSURE: 112 MMHG | OXYGEN SATURATION: 99 % | HEART RATE: 75 BPM | DIASTOLIC BLOOD PRESSURE: 60 MMHG | BODY MASS INDEX: 26.98 KG/M2 | WEIGHT: 146.6 LBS

## 2023-09-27 DIAGNOSIS — R73.01 IMPAIRED FASTING GLUCOSE: ICD-10-CM

## 2023-09-27 DIAGNOSIS — E03.8 OTHER SPECIFIED HYPOTHYROIDISM: Primary | ICD-10-CM

## 2023-09-27 DIAGNOSIS — E78.00 HYPERCHOLESTEROLEMIA: ICD-10-CM

## 2023-09-27 PROCEDURE — 99214 OFFICE O/P EST MOD 30 MIN: CPT | Performed by: NURSE PRACTITIONER

## 2023-09-27 NOTE — PROGRESS NOTES
[facility]        NAME: Armando Paez is a 72 y.o. male  : 1950    MRN: [id number]  DATE: 2023  TIME: 10:33 AM    Assessment and Plan   Other specified hypothyroidism [E03.8]  1. Other specified hypothyroidism        2. Hypercholesterolemia        3. Impaired fasting glucose  HEMOGLOBIN A1C W/ EAG ESTIMATION        1. Other specified hypothyroidism      2. Hypercholesterolemia      3. Impaired fasting glucose    - HEMOGLOBIN A1C W/ EAG ESTIMATION; Future      Patient Instructions     There are no Patient Instructions on file for this visit. Chief Complaint     Chief Complaint   Patient presents with   • Follow-up         History of Present Illness       Patient here for routine follow up visit. Hypothyroidism- compliant with medication. Numbness in foot, hands and face when feeling stressed. Has had testing and everything was negative. Saw a neurologist, dentist, chiropractor. GERD- well controlled unless eating trigger foods. BPH- on Flomax, recent MRI. Review of Systems   Review of Systems   Constitutional: Negative. Negative for activity change, appetite change, chills, fatigue and fever. HENT: Negative for congestion, ear pain, nosebleeds, rhinorrhea and sore throat. Eyes: Negative for photophobia, pain, redness and visual disturbance. Respiratory: Negative. Negative for cough, shortness of breath and wheezing. Cardiovascular: Negative. Negative for chest pain. Gastrointestinal: Negative. Negative for abdominal pain, constipation, diarrhea and vomiting. Endocrine: Negative. Genitourinary: Negative for difficulty urinating, dysuria and flank pain. Musculoskeletal: Negative. Skin: Negative for color change and rash. Neurological: Negative. Negative for dizziness, weakness, numbness and headaches. Hematological: Negative for adenopathy. Psychiatric/Behavioral: Negative.   Negative for agitation and confusion. The patient is not nervous/anxious.         PHQ-2/9 Depression Screening    Little interest or pleasure in doing things: 0 - not at all  Feeling down, depressed, or hopeless: 0 - not at all  PHQ-2 Score: 0  PHQ-2 Interpretation: Negative depression screen        Current Medications       Current Outpatient Medications:   •  aspirin 81 MG tablet, Take 81 mg by mouth, Disp: , Rfl:   •  CALCIUM/MAGNESIUM/ZINC FORMULA PO, Take 1 tablet by mouth daily, Disp: , Rfl:   •  clobetasol (TEMOVATE) 0.05 % ointment, Apply topically as needed, Disp: , Rfl:   •  clonazePAM (KlonoPIN) 0.25 MG disintegrating tablet, Take 1 tablet (0.25 mg total) by mouth 2 (two) times a day, Disp: 60 tablet, Rfl: 0  •  docusate sodium (QC Stool Softener) 100 mg capsule, Take 1 capsule (100 mg total) by mouth daily, Disp: 90 capsule, Rfl: 3  •  finasteride (PROSCAR) 5 mg tablet, Take 5 mg by mouth daily, Disp: , Rfl:   •  GARLIC PO, Take by mouth daily, Disp: , Rfl:   •  levothyroxine 75 mcg tablet, Take 1 tablet (75 mcg total) by mouth daily, Disp: 90 tablet, Rfl: 3  •  Misc Natural Products (LUTEIN 20 PO), Take by mouth, Disp: , Rfl:   •  Multiple Vitamins-Minerals (LUTEIN-ZEAXANTHIN PO), Take 20 mg by mouth, Disp: , Rfl:   •  Multiple Vitamins-Minerals (MULTIVITAMIN PO), Take by mouth daily, Disp: , Rfl:   •  Omega-3 Fatty Acids (FISH OIL PO), Take by mouth daily, Disp: , Rfl:   •  omeprazole (PriLOSEC) 20 mg delayed release capsule, Take 20 mg by mouth, Disp: , Rfl:   •  simvastatin (ZOCOR) 40 mg tablet, Take 1 tablet (40 mg total) by mouth daily at bedtime, Disp: 90 tablet, Rfl: 3  •  tamsulosin (FLOMAX) 0.4 mg, Take 2 capsules (0.8 mg total) by mouth daily with dinner, Disp: 90 capsule, Rfl: 0    Current Allergies     Allergies as of 09/27/2023 - Reviewed 09/27/2023   Allergen Reaction Noted   • Codeine  08/07/2018            The following portions of the patient's history were reviewed and updated as appropriate: allergies, current medications, past family history, past medical history, past social history, past surgical history and problem list.     Past Medical History:   Diagnosis Date   • Benign neoplasm of colon    • Chronic rhinitis    • Colon polyp    • Diverticular disease of colon    • Dysphagia    • GERD (gastroesophageal reflux disease)    • Hyperlipidemia    • Internal hemorrhoids without complication    • PSA (psoriatic arthritis) ([address])     elevated   • Restless leg syndrome    • Thyroid nodule        Past Surgical History:   Procedure Laterality Date   • COLONOSCOPY  09/22/2010   • COLONOSCOPY  06/13/2007   • COLONOSCOPY  05/27/2004   • COLONOSCOPY  12/09/2015   • GALLBLADDER SURGERY     • OTHER SURGICAL HISTORY  11/2012    abcessed tooth   • THYROIDECTOMY     • TONSILLECTOMY     • UPPER GASTROINTESTINAL ENDOSCOPY  10/30/2013       Family History   Problem Relation Age of Onset   • Colon cancer Father    • Other Father         metastatic: lymph tissue, breast, thyroid, prostate, liver, lung   • Other Family         malignant neoplasm of gastrointestinal disease         Medications have been verified. Objective   /60   Pulse 75   Temp 98.3 °F (36.8 °C)   Ht 5' 2" (1.575 m)   Wt 66.5 kg (146 lb 9.6 oz)   SpO2 99%   BMI 26.81 kg/m²        Physical Exam     Physical Exam  Vitals and nursing note reviewed. Constitutional:       General: He is not in acute distress. Appearance: Normal appearance. He is well-developed. He is not ill-appearing. Eyes:      General: Lids are normal.   Cardiovascular:      Rate and Rhythm: Normal rate and regular rhythm. Heart sounds: Normal heart sounds, S1 normal and S2 normal. No murmur heard. Pulmonary:      Effort: Pulmonary effort is normal. No respiratory distress. Breath sounds: Normal breath sounds. No decreased breath sounds or wheezing. Musculoskeletal:         General: No tenderness or deformity. Normal range of motion.    Skin:     General: Skin is warm.      Capillary Refill: Capillary refill takes less than 2 seconds. Findings: No erythema or rash. Neurological:      General: No focal deficit present. Mental Status: He is alert and oriented to person, place, and time. Psychiatric:         Behavior: Behavior normal. Behavior is cooperative. Thought Content:  Thought content normal.

## 2023-10-18 ENCOUNTER — IMMUNIZATIONS (OUTPATIENT)
Dept: FAMILY MEDICINE CLINIC | Facility: CLINIC | Age: 73
End: 2023-10-18
Payer: MEDICARE

## 2023-10-18 DIAGNOSIS — Z23 ENCOUNTER FOR IMMUNIZATION: Primary | ICD-10-CM

## 2023-10-18 PROCEDURE — G0008 ADMIN INFLUENZA VIRUS VAC: HCPCS

## 2023-10-18 PROCEDURE — 90662 IIV NO PRSV INCREASED AG IM: CPT

## 2023-11-09 DIAGNOSIS — K59.00 CONSTIPATION, UNSPECIFIED CONSTIPATION TYPE: ICD-10-CM

## 2023-11-09 DIAGNOSIS — E03.9 HYPOTHYROIDISM, UNSPECIFIED TYPE: ICD-10-CM

## 2023-11-09 DIAGNOSIS — G25.81 RESTLESS LEGS SYNDROME: ICD-10-CM

## 2023-11-09 RX ORDER — DOCUSATE SODIUM 100 MG/1
100 CAPSULE, LIQUID FILLED ORAL DAILY
Qty: 90 CAPSULE | Refills: 0 | Status: SHIPPED | OUTPATIENT
Start: 2023-11-09

## 2023-11-09 RX ORDER — LEVOTHYROXINE SODIUM 0.07 MG/1
75 TABLET ORAL DAILY
Qty: 90 TABLET | Refills: 0 | Status: SHIPPED | OUTPATIENT
Start: 2023-11-09

## 2023-11-09 RX ORDER — CLONAZEPAM 0.25 MG/1
0.25 TABLET, ORALLY DISINTEGRATING ORAL 2 TIMES DAILY
Qty: 60 TABLET | Refills: 0 | Status: SHIPPED | OUTPATIENT
Start: 2023-11-09

## 2023-11-09 NOTE — TELEPHONE ENCOUNTER
Patient requesting refill(s) of:QC Stool Softener    Last filled: 11/17/2022    Patient requesting refill(s) of: Levothyroxine    Last filled: 11/17/2022    Patient requesting refill(s) of: [facility] Esther Hitchcock    Last filled: 10/18/2023  Last appt: 9/27/2023  Next appt: 12/13/2023  [facility]

## 2023-11-28 ENCOUNTER — APPOINTMENT (OUTPATIENT)
Dept: LAB | Facility: CLINIC | Age: 73
End: 2023-11-28
Payer: MEDICARE

## 2023-11-28 DIAGNOSIS — R97.20 ELEVATED PROSTATE SPECIFIC ANTIGEN (PSA): ICD-10-CM

## 2023-11-28 PROCEDURE — 84153 ASSAY OF PSA TOTAL: CPT

## 2023-11-28 PROCEDURE — 84154 ASSAY OF PSA FREE: CPT

## 2023-11-28 PROCEDURE — 36415 COLL VENOUS BLD VENIPUNCTURE: CPT

## 2023-11-29 LAB
PSA FREE MFR SERPL: 24.4 %
PSA FREE SERPL-MCNC: 1.51 NG/ML
PSA SERPL-MCNC: 6.2 NG/ML (ref 0–4)

## 2023-12-08 ENCOUNTER — APPOINTMENT (OUTPATIENT)
Dept: LAB | Facility: CLINIC | Age: 73
End: 2023-12-08
Payer: MEDICARE

## 2023-12-08 DIAGNOSIS — R73.01 IMPAIRED FASTING GLUCOSE: ICD-10-CM

## 2023-12-08 LAB
EST. AVERAGE GLUCOSE BLD GHB EST-MCNC: 111 MG/DL
HBA1C MFR BLD: 5.5 %

## 2023-12-08 PROCEDURE — 83036 HEMOGLOBIN GLYCOSYLATED A1C: CPT

## 2023-12-08 PROCEDURE — 36415 COLL VENOUS BLD VENIPUNCTURE: CPT

## 2023-12-11 DIAGNOSIS — E78.5 HYPERLIPIDEMIA, UNSPECIFIED HYPERLIPIDEMIA TYPE: ICD-10-CM

## 2023-12-11 NOTE — TELEPHONE ENCOUNTER
Patient requesting refill(s) of: Zocor 40mg    Last filled: 11/17/22 #90x3  Last appt: 9/27/23  Next appt: 12/13/23  Pharmacy: [facility]

## 2023-12-12 RX ORDER — SIMVASTATIN 40 MG
40 TABLET ORAL
Qty: 90 TABLET | Refills: 3 | Status: SHIPPED | OUTPATIENT
Start: 2023-12-12

## 2023-12-13 ENCOUNTER — OFFICE VISIT (OUTPATIENT)
Dept: FAMILY MEDICINE CLINIC | Facility: CLINIC | Age: 73
End: 2023-12-13
Payer: MEDICARE

## 2023-12-13 VITALS
OXYGEN SATURATION: 97 % | HEIGHT: 62 IN | TEMPERATURE: 97.6 F | SYSTOLIC BLOOD PRESSURE: 104 MMHG | BODY MASS INDEX: 26.5 KG/M2 | RESPIRATION RATE: 18 BRPM | WEIGHT: 144 LBS | DIASTOLIC BLOOD PRESSURE: 72 MMHG | HEART RATE: 85 BPM

## 2023-12-13 DIAGNOSIS — H91.8X2 OTHER SPECIFIED HEARING LOSS OF LEFT EAR, UNSPECIFIED HEARING STATUS ON CONTRALATERAL SIDE: ICD-10-CM

## 2023-12-13 DIAGNOSIS — K21.00 GASTROESOPHAGEAL REFLUX DISEASE WITH ESOPHAGITIS, UNSPECIFIED WHETHER HEMORRHAGE: ICD-10-CM

## 2023-12-13 DIAGNOSIS — R29.898 LEFT LEG WEAKNESS: ICD-10-CM

## 2023-12-13 DIAGNOSIS — R41.3 MEMORY LOSS: ICD-10-CM

## 2023-12-13 DIAGNOSIS — E78.00 HYPERCHOLESTEROLEMIA: ICD-10-CM

## 2023-12-13 DIAGNOSIS — E03.8 OTHER SPECIFIED HYPOTHYROIDISM: ICD-10-CM

## 2023-12-13 DIAGNOSIS — Z00.00 MEDICARE ANNUAL WELLNESS VISIT, SUBSEQUENT: Primary | ICD-10-CM

## 2023-12-13 PROCEDURE — G0439 PPPS, SUBSEQ VISIT: HCPCS | Performed by: NURSE PRACTITIONER

## 2023-12-13 PROCEDURE — G0444 DEPRESSION SCREEN ANNUAL: HCPCS | Performed by: NURSE PRACTITIONER

## 2023-12-13 PROCEDURE — 99214 OFFICE O/P EST MOD 30 MIN: CPT | Performed by: NURSE PRACTITIONER

## 2023-12-13 NOTE — PROGRESS NOTES
Assessment and Plan:     Problem List Items Addressed This Visit       GERD (gastroesophageal reflux disease)    Relevant Orders    Comprehensive metabolic panel    CBC and differential    Hypercholesterolemia    Relevant Orders    Lipid panel    Other specified hypothyroidism    Relevant Orders    TSH, 3rd generation with Free T4 reflex     Other Visit Diagnoses       Medicare annual wellness visit, subsequent    -  Primary    Relevant Orders    Lipid panel    Comprehensive metabolic panel    HEMOGLOBIN A1C W/ EAG ESTIMATION    TSH, 3rd generation with Free T4 reflex    CBC and differential    Ambulatory Referral to Senior Care    Left leg weakness        Relevant Orders    Ambulatory Referral to Physical Therapy    Other specified hearing loss of left ear, unspecified hearing status on contralateral side        Relevant Orders    Ambulatory Referral to Otolaryngology    Memory loss        Relevant Orders    Ambulatory Referral to Senior Care          BMI Counseling: Body mass index is 26.34 kg/m². The BMI is above normal. Nutrition recommendations include encouraging healthy choices of fruits and vegetables, limiting drinks that contain sugar, moderation in carbohydrate intake, reducing intake of saturated and trans fat and reducing intake of cholesterol. Exercise recommendations include exercising 3-5 times per week. Rationale for BMI follow-up plan is due to patient being overweight or obese. Depression Screening and Follow-up Plan: Patient was screened for depression during today's encounter. They screened negative with a PHQ-2 score of 1. Preventive health issues were discussed with patient, and age appropriate screening tests were ordered as noted in patient's After Visit Summary. Personalized health advice and appropriate referrals for health education or preventive services given if needed, as noted in patient's After Visit Summary.      History of Present Illness:     Patient presents for a Medicare Wellness Visit    Patient here for wellness visit with a few complaint. Left ear- having decreased hearing. Has not had hearing checked. Had had problems with ear wax in the past.     Left leg weakness, when going up the steps feeling like he is having problems walking up with lifting left leg up. Denies any pain. Able to walk. Numbness on the left side of his body with left  hand, foot, facial numbness. And now having weakness in left quadricept. Memory loss- forgetting how to get to certain places. Patient Care Team:  Shae Castro as PCP - General (Family Medicine)     Review of Systems:     Review of Systems   Constitutional: Negative. Negative for fatigue and fever. Respiratory: Negative. Negative for shortness of breath and wheezing. Cardiovascular: Negative. Negative for chest pain and palpitations. Musculoskeletal: Negative. Skin:  Positive for color change. Neurological:  Positive for weakness (left leg, not affecting walking, only stairs) and numbness (face intermittent- workup negative). Negative for dizziness, light-headedness and headaches. Psychiatric/Behavioral:  Positive for decreased concentration (forgetful). Negative for sleep disturbance and suicidal ideas. The patient is not nervous/anxious.          Problem List:     Patient Active Problem List   Diagnosis    GERD (gastroesophageal reflux disease)    Hypercholesterolemia    BPH (benign prostatic hyperplasia)    Elevated PSA    Hyperplastic colonic polyp    Vertigo    Restless legs syndrome    Other specified hypothyroidism    Numbness and tingling of both feet      Past Medical and Surgical History:     Past Medical History:   Diagnosis Date    Benign neoplasm of colon     Chronic rhinitis     Colon polyp     Diverticular disease of colon     Dysphagia     GERD (gastroesophageal reflux disease)     Hyperlipidemia     Internal hemorrhoids without complication     PSA (psoriatic arthritis) ([address] [facility]) elevated    Restless leg syndrome     Thyroid nodule      Past Surgical History:   Procedure Laterality Date    COLONOSCOPY  09/22/2010    COLONOSCOPY  06/13/2007    COLONOSCOPY  05/27/2004    COLONOSCOPY  12/09/2015    GALLBLADDER SURGERY      OTHER SURGICAL HISTORY  11/2012    abcessed tooth    THYROIDECTOMY      TONSILLECTOMY      UPPER GASTROINTESTINAL ENDOSCOPY  10/30/2013      Family History:     Family History   Problem Relation Age of Onset    Colon cancer Father     Other Father         metastatic: lymph tissue, breast, thyroid, prostate, liver, lung    Other Family         malignant neoplasm of gastrointestinal disease      Social History:     Social History     Socioeconomic History    Marital status: /Civil Union     Spouse name: None    Number of children: None    Years of education: None    Highest education level: None   Occupational History    None   Tobacco Use    Smoking status: Never    Smokeless tobacco: Never   Vaping Use    Vaping status: Never Used   Substance and Sexual Activity    Alcohol use: No    Drug use: No    Sexual activity: None   Other Topics Concern    None   Social History Narrative    Consumes on average 3 cups of regular coffee per day. Social Determinants of Health     Financial Resource Strain: Low Risk  (12/8/2023)    Overall Financial Resource Strain (CARDIA)     Difficulty of Paying Living Expenses: Not hard at all   Food Insecurity: Not on file   Transportation Needs: No Transportation Needs (12/8/2023)    PRAPARE - Transportation     Lack of Transportation (Medical): No     Lack of Transportation (Non-Medical):  No   Physical Activity: Not on file   Stress: Not on file   Social Connections: Not on file   Intimate Partner Violence: Not on file   Housing Stability: Not on file      Medications and Allergies:     Current Outpatient Medications   Medication Sig Dispense Refill    aspirin 81 MG tablet Take 81 mg by mouth      CALCIUM/MAGNESIUM/ZINC FORMULA PO Take 1 tablet by mouth daily      clobetasol (TEMOVATE) 0.05 % ointment Apply topically as needed      clonazePAM (KlonoPIN) 0.25 MG disintegrating tablet Take 1 tablet (0.25 mg total) by mouth 2 (two) times a day 60 tablet 0    docusate sodium (QC Stool Softener) 100 mg capsule Take 1 capsule (100 mg total) by mouth daily 90 capsule 0    finasteride (PROSCAR) 5 mg tablet Take 5 mg by mouth daily      GARLIC PO Take by mouth daily      levothyroxine 75 mcg tablet Take 1 tablet (75 mcg total) by mouth daily 90 tablet 0    Misc Natural Products (LUTEIN 20 PO) Take by mouth      Multiple Vitamins-Minerals (LUTEIN-ZEAXANTHIN PO) Take 20 mg by mouth      Multiple Vitamins-Minerals (MULTIVITAMIN PO) Take by mouth daily      Omega-3 Fatty Acids (FISH OIL PO) Take by mouth daily      omeprazole (PriLOSEC) 20 mg delayed release capsule Take 20 mg by mouth      simvastatin (ZOCOR) 40 mg tablet Take 1 tablet (40 mg total) by mouth daily at bedtime 90 tablet 3    tamsulosin (FLOMAX) 0.4 mg Take 2 capsules (0.8 mg total) by mouth daily with dinner 90 capsule 3     No current facility-administered medications for this visit.      Allergies   Allergen Reactions    Codeine       Immunizations:     Immunization History   Administered Date(s) Administered    COVID-19 PFIZER VACCINE 0.3 ML IM 04/01/2021, 04/22/2021, 11/29/2021, 06/28/2022    COVID-19 Pfizer Vac BIVALENT Nir-sucrose 12 Yr+ IM 03/28/2023    INFLUENZA 12/05/2017, 10/05/2022    Influenza, high dose seasonal 0.7 mL 09/24/2018, 09/25/2019, 09/24/2020, 10/27/2021, 10/05/2022, 10/18/2023    Pneumococcal Conjugate 13-Valent 12/19/2018    Pneumococcal Polysaccharide PPV23 05/13/2020    Tdap 12/19/2018    Zoster 09/06/2016    Zoster Vaccine Recombinant 08/24/2022      Health Maintenance:         Topic Date Due    Colorectal Cancer Screening  05/04/2027    Hepatitis C Screening  Completed         Topic Date Due    COVID-19 Vaccine (6 - 2023-24 season) 09/01/2023      Medicare Screening Tests and Risk Assessments:     Rd Le is here for his Subsequent Wellness visit. Last Medicare Wellness visit information reviewed, patient interviewed, no change since last AWV. Health Risk Assessment:   Patient rates overall health as very good. Patient feels that their physical health rating is same. Patient is very satisfied with their life. Eyesight was rated as same. Hearing was rated as same. Patient feels that their emotional and mental health rating is same. Patients states they are never, rarely angry. Patient states they are sometimes unusually tired/fatigued. Pain experienced in the last 7 days has been none. Patient states that he has experienced no weight loss or gain in last 6 months. Depression Screening:   PHQ-2 Score: 1      Fall Risk Screening: In the past year, patient has experienced: no history of falling in past year      Home Safety:  Patient has trouble with stairs inside or outside of their home. Patient has working smoke alarms and has working carbon monoxide detector. Home safety hazards include: none. Nutrition:   Current diet is Regular and Limited junk food. Medications:   Patient is currently taking over-the-counter supplements. OTC medications include: see medication list. Patient is able to manage medications. Activities of Daily Living (ADLs)/Instrumental Activities of Daily Living (IADLs):   Walk and transfer into and out of bed and chair?: Yes  Dress and groom yourself?: Yes    Bathe or shower yourself?: Yes    Feed yourself? Yes  Do your laundry/housekeeping?: Yes  Manage your money, pay your bills and track your expenses?: Yes  Make your own meals?: Yes    Do your own shopping?: Yes    Previous Hospitalizations:   Any hospitalizations or ED visits within the last 12 months?: No      Advance Care Planning:   Living will: Yes    Durable POA for healthcare:  Yes    Advanced directive: Yes    Five wishes given: No    Patient declined ACP directive: No      Cognitive Screening:   Provider or family/friend/caregiver concerned regarding cognition?: No    PREVENTIVE SCREENINGS      Cardiovascular Screening:    General: History Lipid Disorder and Screening Current      Diabetes Screening:     General: Screening Current      Colorectal Cancer Screening:     General: Screening Current      Prostate Cancer Screening:    General: Screening Current      Osteoporosis Screening:    General: Screening Not Indicated      Abdominal Aortic Aneurysm (AAA) Screening:    Risk factors include: age between 70-77 yo        General: Screening Not Indicated      Lung Cancer Screening:     General: Screening Not Indicated      Hepatitis C Screening:    General: Screening Current    Screening, Brief Intervention, and Referral to Treatment (SBIRT)    Screening  Typical number of drinks in a day: 0  Typical number of drinks in a week: 0  Interpretation: Low risk drinking behavior. AUDIT-C Screenin) How often did you have a drink containing alcohol in the past year? never  2) How many drinks did you have on a typical day when you were drinking in the past year? 0  3) How often did you have 6 or more drinks on one occasion in the past year? never    AUDIT-C Score: 0  Interpretation: Score 0-3 (male): Negative screen for alcohol misuse    Single Item Drug Screening:  How often have you used an illegal drug (including marijuana) or a prescription medication for non-medical reasons in the past year? never    Single Item Drug Screen Score: 0  Interpretation: Negative screen for possible drug use disorder    Brief Intervention  Alcohol & drug use screenings were reviewed. No concerns regarding substance use disorder identified. Annual Depression Screening  Time spent screening and evaluating the patient for depression during today's encounter was 5 minutes.     Other Counseling Topics:   Car/seat belt/driving safety, sunscreen and regular weightbearing exercise and calcium and vitamin D intake. Skin check at dermatology- had biopsy- basal cell carcinoma - 2/2/23 for Wei procedure. No results found. Physical Exam:     /72   Pulse 85   Temp 97.6 °F (36.4 °C) (Tympanic)   Resp 18   Ht 5' 2" (1.575 m)   Wt 65.3 kg (144 lb)   SpO2 97%   BMI 26.34 kg/m²     Physical Exam  Vitals and nursing note reviewed. Constitutional:       General: He is not in acute distress. Appearance: He is well-developed. He is not ill-appearing, toxic-appearing or diaphoretic. HENT:      Head: Normocephalic and atraumatic. Right Ear: Tympanic membrane, ear canal and external ear normal. There is no impacted cerumen. Left Ear: Tympanic membrane, ear canal and external ear normal. There is no impacted cerumen. Pulmonary:      Effort: Pulmonary effort is normal. No respiratory distress. Breath sounds: Normal breath sounds. No stridor. Neurological:      Mental Status: He is alert and oriented to person, place, and time. Psychiatric:         Behavior: Behavior normal. Behavior is cooperative. Thought Content:  Thought content normal.         Judgment: Judgment normal.          MARGARITA Oneal

## 2023-12-13 NOTE — PATIENT INSTRUCTIONS
Medicare Preventive Visit Patient Instructions  Thank you for completing your Welcome to Medicare Visit or Medicare Annual Wellness Visit today. Your next wellness visit will be due in one year (12/13/2024). The screening/preventive services that you may require over the next 5-10 years are detailed below. Some tests may not apply to you based off risk factors and/or age. Screening tests ordered at today's visit but not completed yet may show as past due. Also, please note that scanned in results may not display below. Preventive Screenings:  Service Recommendations Previous Testing/Comments   Colorectal Cancer Screening  Colonoscopy    Fecal Occult Blood Test (FOBT)/Fecal Immunochemical Test (FIT)  Fecal DNA/Cologuard Test  Flexible Sigmoidoscopy Age: 43-73 years old   Colonoscopy: every 10 years (May be performed more frequently if at higher risk)  OR  FOBT/FIT: every 1 year  OR  Cologuard: every 3 years  OR  Sigmoidoscopy: every 5 years  Screening may be recommended earlier than age 39 if at higher risk for colorectal cancer. Also, an individualized decision between you and your healthcare provider will decide whether screening between the ages of 77-80 would be appropriate. Colonoscopy: 05/04/2022  FOBT/FIT: Not on file  Cologuard: Not on file  Sigmoidoscopy: Not on file          Prostate Cancer Screening Individualized decision between patient and health care provider in men between ages of 53-66   Medicare will cover every 12 months beginning on the day after your 50th birthday PSA: 6.2 ng/mL           Hepatitis C Screening Once for adults born between 1945 and 1965  More frequently in patients at high risk for Hepatitis C Hep C Antibody: 12/31/2018        Diabetes Screening 1-2 times per year if you're at risk for diabetes or have pre-diabetes Fasting glucose: 108 mg/dL (5/31/2023)  A1C: 5.5 % (12/8/2023)      Cholesterol Screening Once every 5 years if you don't have a lipid disorder.  May order more often based on risk factors. Lipid panel: 05/31/2023         Other Preventive Screenings Covered by Medicare:  Abdominal Aortic Aneurysm (AAA) Screening: covered once if your at risk. You're considered to be at risk if you have a family history of AAA or a male between the age of 70-76 who smoking at least 100 cigarettes in your lifetime. Lung Cancer Screening: covers low dose CT scan once per year if you meet all of the following conditions: (1) Age 48-67; (2) No signs or symptoms of lung cancer; (3) Current smoker or have quit smoking within the last 15 years; (4) You have a tobacco smoking history of at least 20 pack years (packs per day x number of years you smoked); (5) You get a written order from a healthcare provider. Glaucoma Screening: covered annually if you're considered high risk: (1) You have diabetes OR (2) Family history of glaucoma OR (3)  aged 48 and older OR (3)  American aged 72 and older  Osteoporosis Screening: covered every 2 years if you meet one of the following conditions: (1) Have a vertebral abnormality; (2) On glucocorticoid therapy for more than 3 months; (3) Have primary hyperparathyroidism; (4) On osteoporosis medications and need to assess response to drug therapy. HIV Screening: covered annually if you're between the age of 14-79. Also covered annually if you are younger than 13 and older than 72 with risk factors for HIV infection. For pregnant patients, it is covered up to 3 times per pregnancy.     Immunizations:  Immunization Recommendations   Influenza Vaccine Annual influenza vaccination during flu season is recommended for all persons aged >= 6 months who do not have contraindications   Pneumococcal Vaccine   * Pneumococcal conjugate vaccine = PCV13 (Prevnar 13), PCV15 (Vaxneuvance), PCV20 (Prevnar 20)  * Pneumococcal polysaccharide vaccine = PPSV23 (Pneumovax) Adults 74-72 yo with certain risk factors or if 69+ yo  If never received any pneumonia vaccine: recommend Prevnar 21 (PCV20)  Give PCV20 if previously received 1 dose of PCV13 or PPSV23   Hepatitis B Vaccine 3 dose series if at intermediate or high risk (ex: diabetes, end stage renal disease, liver disease)   Respiratory syncytial virus (RSV) Vaccine - COVERED BY MEDICARE PART D  * RSVPreF3 (Arexvy) CDC recommends that adults 61years of age and older may receive a single dose of RSV vaccine using shared clinical decision-making (SCDM)   Tetanus (Td) Vaccine - COST NOT COVERED BY MEDICARE PART B Following completion of primary series, a booster dose should be given every 10 years to maintain immunity against tetanus. Td may also be given as tetanus wound prophylaxis. Tdap Vaccine - COST NOT COVERED BY MEDICARE PART B Recommended at least once for all adults. For pregnant patients, recommended with each pregnancy. Shingles Vaccine (Shingrix) - COST NOT COVERED BY MEDICARE PART B  2 shot series recommended in those 19 years and older who have or will have weakened immune systems or those 50 years and older     Health Maintenance Due:      Topic Date Due   • Colorectal Cancer Screening  05/04/2027   • Hepatitis C Screening  Completed     Immunizations Due:      Topic Date Due   • COVID-19 Vaccine (6 - 2023-24 season) 09/01/2023     Advance Directives   What are advance directives? Advance directives are legal documents that state your wishes and plans for medical care. These plans are made ahead of time in case you lose your ability to make decisions for yourself. Advance directives can apply to any medical decision, such as the treatments you want, and if you want to donate organs. What are the types of advance directives? There are many types of advance directives, and each state has rules about how to use them. You may choose a combination of any of the following:  Living will: This is a written record of the treatment you want.  You can also choose which treatments you do not want, which to limit, and which to stop at a certain time. This includes surgery, medicine, IV fluid, and tube feedings. Durable power of  for [facility]): This is a written record that states who you want to make healthcare choices for you when you are unable to make them for yourself. This person, called a proxy, is usually a family member or a friend. You may choose more than 1 proxy. Do not resuscitate (DNR) order:  A DNR order is used in case your heart stops beating or you stop breathing. It is a request not to have certain forms of treatment, such as CPR. A DNR order may be included in other types of advance directives. Medical directive: This covers the care that you want if you are in a coma, near death, or unable to make decisions for yourself. You can list the treatments you want for each condition. Treatment may include pain medicine, surgery, blood transfusions, dialysis, IV or tube feedings, and a ventilator (breathing machine). Values history: This document has questions about your views, beliefs, and how you feel and think about life. This information can help others choose the care that you would choose. Why are advance directives important? An advance directive helps you control your care. Although spoken wishes may be used, it is better to have your wishes written down. Spoken wishes can be misunderstood, or not followed. Treatments may be given even if you do not want them. An advance directive may make it easier for your family to make difficult choices about your care. Weight Management   Why it is important to manage your weight:  Being overweight increases your risk of health conditions such as heart disease, high blood pressure, type 2 diabetes, and certain types of cancer. It can also increase your risk for osteoarthritis, sleep apnea, and other respiratory problems. Aim for a slow, steady weight loss. Even a small amount of weight loss can lower your risk of health problems.   How to lose weight safely:  A safe and healthy way to lose weight is to eat fewer calories and get regular exercise. You can lose up about 1 pound a week by decreasing the number of calories you eat by 500 calories each day. Healthy meal plan for weight management:  A healthy meal plan includes a variety of foods, contains fewer calories, and helps you stay healthy. A healthy meal plan includes the following:  Eat whole-grain foods more often. A healthy meal plan should contain fiber. Fiber is the part of grains, fruits, and vegetables that is not broken down by your body. Whole-grain foods are healthy and provide extra fiber in your diet. Some examples of whole-grain foods are whole-wheat breads and pastas, oatmeal, brown rice, and bulgur. Eat a variety of vegetables every day. Include dark, leafy greens such as spinach, kale, jordan greens, and mustard greens. Eat yellow and orange vegetables such as carrots, sweet potatoes, and winter squash. Eat a variety of fruits every day. Choose fresh or canned fruit (canned in its own juice or light syrup) instead of juice. Fruit juice has very little or no fiber. Eat low-fat dairy foods. Drink fat-free (skim) milk or 1% milk. Eat fat-free yogurt and low-fat cottage cheese. Try low-fat cheeses such as mozzarella and other reduced-fat cheeses. Choose meat and other protein foods that are low in fat. Choose beans or other legumes such as split peas or lentils. Choose fish, skinless poultry (chicken or turkey), or lean cuts of red meat (beef or pork). Before you cook meat or poultry, cut off any visible fat. Use less fat and oil. Try baking foods instead of frying them. Add less fat, such as margarine, sour cream, regular salad dressing and mayonnaise to foods. Eat fewer high-fat foods. Some examples of high-fat foods include french fries, doughnuts, ice cream, and cakes. Eat fewer sweets. Limit foods and drinks that are high in sugar.  This includes candy, cookies, regular soda, and sweetened drinks. Exercise:  Exercise at least 30 minutes per day on most days of the week. Some examples of exercise include walking, biking, dancing, and swimming. You can also fit in more physical activity by taking the stairs instead of the elevator or parking farther away from stores. Ask your healthcare provider about the best exercise plan for you. © Copyright [employer] 2018 Information is for End User's use only and may not be sold, redistributed or otherwise used for commercial purposes.  All illustrations and images included in CareNotes® are the copyrighted property of A.D.A.M., Inc. or  [address]

## 2023-12-16 DIAGNOSIS — G25.81 RESTLESS LEGS SYNDROME: ICD-10-CM

## 2023-12-18 NOTE — TELEPHONE ENCOUNTER
Patient requesting refill(s) of: Klonopin 0.25 mg     Last filled: 11//09/23  Last appt:12/13/23  Next appt:6/13/24  Pharmacy: [facility] [place]

## 2023-12-19 RX ORDER — CLONAZEPAM 0.25 MG/1
0.25 TABLET, ORALLY DISINTEGRATING ORAL 2 TIMES DAILY
Qty: 60 TABLET | Refills: 0 | Status: SHIPPED | OUTPATIENT
Start: 2023-12-19

## 2023-12-22 ENCOUNTER — TELEPHONE (OUTPATIENT)
Age: 73
End: 2023-12-22

## 2023-12-22 NOTE — TELEPHONE ENCOUNTER
[facility]  [address] [place] [address], [address]  [place] [address]    [phone]    Telephone Intake: Geriatric Assessment     - Chart Review  Has this patient been seen by our department in the last 3 years? No  Please route to provider for chart review prior to scheduling and let the caller know that this phone intake will be reviewed IF -  Pt was recently hospitalized  Pt is prescribed medications for behavior management or has a history of psychiatric hospitalization  Pt plans to attend alone    Referral source: MARGARITA Thomas    Caller who is scheduling/relationship to pt: patient and spouse, Suni Ivorywinnie  Caller's phone number: [phone]    Reason for referral: Patient concerns , Family member concerns, and Provider concerns regarding memory concerns, driving concerns, and mood concerns.  If there are behavioral concerns, is the pt prescribed medications to manage these? no   If so, how many? none   Has the patient ever had an inpatient psychiatric hospitalization? No,   What is the goal of the visit? Initial assessment and diagnosis     Has the patient been seen by a Neurologist or Geriatrician? Yes, Neurology almost 20 years due to left side numbness; determined to be stress related.   If yes, is this appointment for a second opinion? No  Has the patient ever been diagnosed with dementia? No  Has the patient had an MRI NeuroQuant within the last 1 year? No (If so, please route to provider to determine if assessment + conference are needed or if only assessment should be scheduled)      Preferred language? English  Highest education level? Masters  Does the patient wear glasses? Yes, readers   Does the patient use hearing aids? No     Is there a living will/healthcare POA in place/If so, who? Yes , Proxy, Suni Wilde, spouse    Does the pt/caregiver have access for a virtual visit (computer/smart phone with audio/video)? No    Caller was informed:   Please  make sure the pt is accompanied by someone who knows them well / caregiver / family member to participate in this appointment.   Who will accompany the pt (name and relationship)? Suni Wilde, spouse  Phone number of person accompanying pt: [phone]  Please make sure the pt attends all appointments, including the assessment, care conference, follow-up, whether in-person or virtual.  For virtual visits, pt must be physically present in [facility].     Office packet mailed out to: Marshall GARCIA [id number]  Added to wait list for sooner appointments/notified that calls can be short notice (same day/day prior)? Yes

## 2023-12-27 NOTE — PROGRESS NOTES
PT Evaluation     Today's date: 2023  Patient name: Jaguar Wilde  : 1950  MRN: [id number]  Referring provider: Nenita Renae CRNP  Dx:   Encounter Diagnosis     ICD-10-CM    1. Left leg weakness  R29.898                      Assessment  Assessment details: Jaguar Wilde is a 73 y.o. male with a history of GERD, chronic rhinitis, diverticulosis, dysphagia, hyperlipidemia, psoriatic arthritis, and RLS, that presents for a moderate complexity physical therapy initial evaluation.  The patient demonstrates signs and symptoms consistent with L LE weakness.  During the examination the patient demonstrated decreased L LE strength, decreased lumbar EXT ROM, and activity intolerance  The patient's impairments are causing the following functional limitations: difficulty with prolonged standing, prolonged walking, walking on unlevel surfaces, difficulty squatting/kneeling, difficulty stair-climbing, and difficulty transferring from very low surfaces.  The patient's clinical presentation is evolving due to a number of participation restrictions, significant medial history, and functional limitation (FOTO 71% function).  The patient will benefit from skilled PT services to address impairments, work towards goals, and restore PLOF.            Impairments: abnormal or restricted ROM, activity intolerance, impaired balance, impaired physical strength, lacks appropriate home exercise program, pain with function and poor posture   Functional limitations: difficulty with prolonged standing, prolonged walking, walking on unlevel surfaces, difficulty squatting/kneeling, difficulty stair-climbing, and difficulty transferring from very low surfaces.  Symptom irritability: lowUnderstanding of Dx/Px/POC: good   Prognosis: good  Prognosis details: Positive prognostic indicators include: positive attitude toward recovery, good understanding of diagnosis/treatment plan, and absence of observed red flags.       Negative prognostic indicators include: Chronicity      Goals  STG: achieve in 4-6 weeks  1.  Patient's L LE strength improve by 1/2-1 muscle grade to improve ambulation up/down the steps.  2.  Patient's tandem stance balance improve to >20 seconds without LOB B/L LE.  3. 30 second sit to stand score improve by 5 stands ( total 15 stands) to indicate improved transfers/ LE strength.    LTG:  Achieve in 6-12 weeks  1.  Patient's knee FOTO score improve to > 75% to indicate a return to normal functioning.  2.  Patient's SLB improve to > 30 sec without LOB to indicate improved balance/stability L LE.  3.  Patient ambulate up /down steps normally and find out what the problem is regarding L LE weakness to achieve their personal goal.  4. Patient to achieve independence with home exercise plan.        Plan  Plan details: RE-ASSESS 1X/MONTH  Patient will come to PT 1x/week  Patient would benefit from: skilled physical therapy  Planned modality interventions: cryotherapy and thermotherapy: hydrocollator packs  Planned therapy interventions: manual therapy, neuromuscular re-education, patient education, postural training, self care, therapeutic activities, therapeutic exercise, home exercise program, abdominal trunk stabilization, balance, balance/weight bearing training, strengthening and joint mobilization  Frequency: 1-3x/wk.  Duration in weeks: 12  Plan of Care beginning date: 12/29/2023  Plan of Care expiration date: 3/29/2024  Treatment plan discussed with: PTA and patient        Subjective Evaluation    History of Present Illness  Date of onset: 6/21/2023  Mechanism of injury: Jaguar Wilde is a 73 y.o. male that presents to outpatient physical therapy with complaints of L LE weakness when going up the steps.  The patient reports feeling like the L leg will give out which worsens with carrying items up/down the steps.  The patient notes numbness/tingling at L face, L hand, and L foot which the patient was  told is related to stress/anxiety -  no CVA/TIA.  The patient reports Hx of EMG/NCV which was (-).  The patient notes onset 6 months prior without known cause.   Patient notes some pain wearing tied shoes at his L foot when kraen them all day versus wearing slip on shoes( ? Swelling). The patient's main goal for physical therapy is to find out the cause of the problem and rule out if this is a problem requiring more invasive intervention/consultation.     Patient Goals  Patient goals for therapy: increased motion, improved balance, increased strength, independence with ADLs/IADLs and return to sport/leisure activities  Patient goal: find out cause and need for further intervention  Pain  No pain reported    Social Support  Steps to enter house: yes  Stairs in house: yes   Lives in: multiple-level home  Lives with: spouse    Employment status: not working (retired )  Treatments  Current treatment: physical therapy      Objective     Concurrent Complaints  Negative for night pain, disturbed sleep, bladder dysfunction, bowel dysfunction, saddle (S4) numbness, history of cancer, history of trauma and infection    Postural Observations  Seated posture: fair  Standing posture: fair  Correction of posture: has no consistent effect      Neurological Testing     Sensation     Lumbar   Left   Intact: light touch    Right   Intact: light touch    Reflexes   Left   Patellar (L4): normal (2+)  Achilles (S1): normal (2+)  Babinski sign: negative    Right   Patellar (L4): normal (2+)  Achilles (S1): normal (2+)  Babinski sign: negative    Active Range of Motion     Lumbar   Flexion:  Restriction level: minimal  Extension:  Restriction level: maximal  Left lateral flexion:  Restriction level: moderate  Right lateral flexion:  Restriction level: moderate  Mechanical Assessment    Cervical      Thoracic      Lumbar    Standing flexion: repeated movements   Pain intensity: worse  Standing extension: repeated  movements  Pain location: no change  Lying extension: repeated movements  Pain intensity: better    Strength/Myotome Testing     Left Hip   Planes of Motion   Flexion: 4-  Abduction: 4  Adduction: 4    Right Hip   Planes of Motion   Flexion: 4+  Abduction: 4  Adduction: 4    Left Knee   Flexion: 3+  Extension: 3+    Right Knee   Flexion: 4  Extension: 4+    Left Ankle/Foot   Dorsiflexion: 5  Great toe extension: 5    Right Ankle/Foot   Dorsiflexion: 5  Great toe extension: 5    Tests     Additional Tests Details  30 SSTS: 10 stands ( R lateral lean) (+) fatigue L thigh  FOTO: 71% ( predicted 75%)  SLB: L: (+) LOB after 2 sec R: 14 sec (+) LOB             Precautions: L Quad Weakness  RE: 1/26  Access Code [email or web address]   Specialty Daily Treatment Diary   BASELINE: AMB UP STEPS WEAK L QUADS/HAMS/HIP FLEXOR    Manual  12/29                                                   Ther Ex 12/29       Standing lumbar EXT  FLEX X10 no change    X10 WORSE       Stand UBE  core/posture strength ALT 90/70        SLR w/ QS  *      Bridges  *      Hoist knee EXT  Knee Flex        ROXIE  Prone press ups x5  x10       T-spine extensions *       Nu -step towel roll for endurance S= 12  *L3      Neuro Re-Ed        Alt step taps XL        Foam Balance feet together EO                       EC        TA  *      Kegels  *      Multifidi  *      Glute sets  *      CSMi weight shift/squat  balance *       Foam step overs *       Fitter Board front/side        Towel roll education / posture education / movement precautions / HEP DONE AD       Heel walk / Toe walk  test      Heel-toe amb  *      Hurdles Amb OBO        Shop walk                Mat walk        SLB 1x:30 B/L               Ther Activity        Chair Squats x10 *      Up/down steps SOS        Reverse Lunge        Step ups fwd/side L *      Chair squats with step taps                    Modalities                                       The patient was given a new home exercise plan with  instruction, pictures, and verbal feedback.  The patient accepts and understands the new home activities.

## 2023-12-29 ENCOUNTER — EVALUATION (OUTPATIENT)
Dept: PHYSICAL THERAPY | Facility: CLINIC | Age: 73
End: 2023-12-29
Payer: MEDICARE

## 2023-12-29 DIAGNOSIS — R29.898 LEFT LEG WEAKNESS: Primary | ICD-10-CM

## 2023-12-29 PROCEDURE — 97112 NEUROMUSCULAR REEDUCATION: CPT | Performed by: PHYSICAL THERAPIST

## 2023-12-29 PROCEDURE — 97110 THERAPEUTIC EXERCISES: CPT | Performed by: PHYSICAL THERAPIST

## 2023-12-29 PROCEDURE — 97162 PT EVAL MOD COMPLEX 30 MIN: CPT | Performed by: PHYSICAL THERAPIST

## 2024-01-05 ENCOUNTER — OFFICE VISIT (OUTPATIENT)
Dept: PHYSICAL THERAPY | Facility: CLINIC | Age: 74
End: 2024-01-05
Payer: MEDICARE

## 2024-01-05 DIAGNOSIS — R29.898 LEFT LEG WEAKNESS: Primary | ICD-10-CM

## 2024-01-05 PROCEDURE — 97530 THERAPEUTIC ACTIVITIES: CPT

## 2024-01-05 PROCEDURE — 97110 THERAPEUTIC EXERCISES: CPT

## 2024-01-05 PROCEDURE — 97112 NEUROMUSCULAR REEDUCATION: CPT

## 2024-01-05 NOTE — PROGRESS NOTES
Daily Note     Today's date: 2024  Patient name: Jaguar Wilde  : 1950  MRN: [id number]  Referring provider: Nenita Renae CRNP  Dx:   Encounter Diagnosis     ICD-10-CM    1. Left leg weakness  R29.898           Start Time: 746          Subjective: Patient states he has no pain. He has been using his towel roll which has been helping his back. Over all he feels it has been a little easier going up his stairs at home.      Objective: See treatment diary below      Assessment: Tolerated treatment well. Patient would benefit from continued PT for stretching and strengthening. Patient was able to add exercises to his program with little difficulty and no pain. He seemed to understand all education on new exercises. Patient felt ok when he left department.       Plan: Continue per plan of care.  Progress treatment as tolerated.       Precautions: L Quad Weakness  RE:   Access Code [email or web address]   Specialty Daily Treatment Diary   BASELINE: AMB UP STEPS WEAK L QUADS/HAMS/HIP FLEXOR    Manual                                                    Ther Ex       Standing lumbar EXT  FLEX X10 no change    X10 WORSE       Stand UBE  core/posture strength ALT 90/70        SLR w/ QS  *x10 B/L      Bridges  *:03x10      Hoist knee EXT  Knee Flex        ROXIE  Prone press ups x5  x10 X5  x10      T-spine extensions *       Nu -step towel roll for endurance S= 7  *L3 x5 min      Neuro Re-Ed        Alt step taps XL        Foam Balance feet together EO                       EC        TA  *x10      Kegels  *x10      Multifidi  *x10      Glute sets  *x10      CSMi weight shift/squat  balance *       Foam step overs *       Fitter Board front/side        Towel roll education / posture education / movement precautions / HEP DONE AD       Heel walk / Toe walk  Test good B dir 10ft x1 ea      Heel-toe amb  *10 ft x4      Hurdles Amb OBO        Shop walk                Mat walk        SLB 1x:30 B/L :30x2  B/L              Ther Activity        Chair Squats x10 *x10      Up/down steps SOS        Reverse Lunge        Step ups fwd/side L *L x10 ea      Chair squats with step taps                    Modalities                                    Access Code: [email or web address]  URL: [email or web address]  Date: 01/05/2024  Prepared by: Azul Tadeo    Exercises  - Prone on Elbows Stretch  - 4-6 x daily - 7 x weekly - 1 sets - 10 reps  - Prone Press Up  - 8 x daily - 7 x weekly - 1 sets - 10 reps - 2 hold  - Standing Lumbar Extension with Counter  - 8 x daily - 7 x weekly - 1 sets - 10 reps  - Supine Transversus Abdominis Bracing - Hands on Stomach  - 2 x daily - 7 x weekly - 1 sets - 10 reps - 5 sec hold  - Supine Pelvic Floor Contraction  - 2 x daily - 7 x weekly - 1 sets - 10 reps - 5 sec hold  - Gluteal Sets  - 2 x daily - 7 x weekly - 1 sets - 10 reps - 5 sec hold  - Active Straight Leg Raise with Quad Set  - 2 x daily - 7 x weekly - 1 sets - 10 reps  - Supine Bridge  - 2 x daily - 7 x weekly - 1 sets - 10 reps - 3-5 sec hold  - Seated Multifidi Isometric  - 2 x daily - 7 x weekly - 1 sets - 10 reps - 3-5 sec hold  - Tandem Walking with Counter Support  - 2 x daily - 7 x weekly - 1 sets - 4 reps  - Sit to Stand  - 2 x daily - 7 x weekly - 1 sets - 10 reps    Patient Education  - Office Posture

## 2024-01-12 ENCOUNTER — OFFICE VISIT (OUTPATIENT)
Dept: PHYSICAL THERAPY | Facility: CLINIC | Age: 74
End: 2024-01-12
Payer: MEDICARE

## 2024-01-12 DIAGNOSIS — R29.898 LEFT LEG WEAKNESS: Primary | ICD-10-CM

## 2024-01-12 PROCEDURE — 97140 MANUAL THERAPY 1/> REGIONS: CPT

## 2024-01-12 PROCEDURE — 97112 NEUROMUSCULAR REEDUCATION: CPT

## 2024-01-12 PROCEDURE — 97110 THERAPEUTIC EXERCISES: CPT

## 2024-01-12 NOTE — PROGRESS NOTES
"Daily Note     Today's date: 2024  Patient name: Jaguar Wilde  : 1950  MRN: [id number]  Referring provider: Nenita Renae CRNP  Dx:   Encounter Diagnosis     ICD-10-CM    1. Left leg weakness  R29.898           Start Time: 740  Stop Time: 08  Total time in clinic (min): 53 minutes    Subjective: Patient reports steady improvement in regards to LBP and denies pain/soreness pre-tx. Patient states, \" I can go further up the steps before my legs feel weak\".       Objective: See treatment diary below      Assessment: Patient tolerated treatment session well. Treatment session consisted of improving balance, strength, and lumbar mobility within a pain-free comfortable range. Patient displayed excellent neuromuscular control especially while standing on foam surface without any bouts of unsteadiness/LOB. Added fitter board to program which was challenging 2* to coordination and stability deficits. Yung Dimas DPT performed all manuals/mobilizations noting a favorable response and centralization of LBP with techniques performed. Patient appropriately fatigued by conclusion of visit and would benefit from continued strengthening, balance training, and manual techniques to restore functional deficits.       Plan: Continue per POC. Increase reps/resistance as tolerated.      Precautions: L Quad Weakness  RE:   Access Code [email or web address]   Specialty Daily Treatment Diary   BASELINE: AMB UP STEPS WEAK L QUADS/HAMS/HIP FLEXOR    Manual       Lumbar PA   AD     PPU with OP   AD                                  Ther Ex      Standing lumbar EXT  FLEX X10 no change    X10 WORSE       Stand UBE  core/posture strength ALT 90/70        SLR w/ QS  *x10 B/L X15 B/L     Bridges  *:03x10 :03 x 10     Hoist knee EXT  Knee Flex        ROXIE  Prone press ups x5  x10 X5  x10 X5  x10     T-spine extensions *       Nu -step towel roll for endurance S= 7  *L3 x5 min L3 x 8 min      Neuro Re-Ed   " "     Alt step taps XL        Foam Balance feet together EO                       EC   EO 30\" x 1     EC 30\" x 1      TA  *x10 5\" x 10      Kegels  *x10      Multifidi  *x10      Glute sets  *x10 5\" x 10      CSMi weight shift/squat  balance *       Foam step overs *       Fitter Board front/side   X 10 ea      Towel roll education / posture education / movement precautions / HEP DONE AD       Heel walk / Toe walk  Test good B dir 10ft x1 ea      Heel-toe amb  *10 ft x4 10ft x 4      Hurdles Amb OBO        Shop walk                Mat walk        SLB 1x:30 B/L :30x2 B/L 30x2 B/L             Ther Activity        Chair Squats x10 *x10 HOLD     Up/down steps SOS        Reverse Lunge        Step ups fwd/side L *L x10 ea 6\" 2x10 ea      Chair squats with step taps                    Modalities                                    Access Code: [email or web address]  URL: [email or web address]  Date: 01/05/2024  Prepared by: Azul Tadeo    Exercises  - Prone on Elbows Stretch  - 4-6 x daily - 7 x weekly - 1 sets - 10 reps  - Prone Press Up  - 8 x daily - 7 x weekly - 1 sets - 10 reps - 2 hold  - Standing Lumbar Extension with Counter  - 8 x daily - 7 x weekly - 1 sets - 10 reps  - Supine Transversus Abdominis Bracing - Hands on Stomach  - 2 x daily - 7 x weekly - 1 sets - 10 reps - 5 sec hold  - Supine Pelvic Floor Contraction  - 2 x daily - 7 x weekly - 1 sets - 10 reps - 5 sec hold  - Gluteal Sets  - 2 x daily - 7 x weekly - 1 sets - 10 reps - 5 sec hold  - Active Straight Leg Raise with Quad Set  - 2 x daily - 7 x weekly - 1 sets - 10 reps  - Supine Bridge  - 2 x daily - 7 x weekly - 1 sets - 10 reps - 3-5 sec hold  - Seated Multifidi Isometric  - 2 x daily - 7 x weekly - 1 sets - 10 reps - 3-5 sec hold  - Tandem Walking with Counter Support  - 2 x daily - 7 x weekly - 1 sets - 4 reps  - Sit to Stand  - 2 x daily - 7 x weekly - 1 sets - 10 reps    Patient Education  - Office Posture       "

## 2024-01-15 DIAGNOSIS — G25.81 RESTLESS LEGS SYNDROME: ICD-10-CM

## 2024-01-15 NOTE — TELEPHONE ENCOUNTER
Patient requesting refill(s) of: Clonazepam     Last filled: 12/19/23  Last appt: 12/13/23  Next appt: 6/13/24  Pharmacy: [facility]

## 2024-01-16 RX ORDER — CLONAZEPAM 0.25 MG/1
0.25 TABLET, ORALLY DISINTEGRATING ORAL 2 TIMES DAILY
Qty: 60 TABLET | Refills: 0 | Status: SHIPPED | OUTPATIENT
Start: 2024-01-16

## 2024-01-19 ENCOUNTER — APPOINTMENT (OUTPATIENT)
Dept: PHYSICAL THERAPY | Facility: CLINIC | Age: 74
End: 2024-01-19
Payer: MEDICARE

## 2024-01-25 ENCOUNTER — OFFICE VISIT (OUTPATIENT)
Age: 74
End: 2024-01-25
Payer: MEDICARE

## 2024-01-25 VITALS
HEIGHT: 63 IN | SYSTOLIC BLOOD PRESSURE: 120 MMHG | WEIGHT: 146.8 LBS | HEART RATE: 92 BPM | OXYGEN SATURATION: 98 % | DIASTOLIC BLOOD PRESSURE: 70 MMHG | TEMPERATURE: 98.3 F | BODY MASS INDEX: 26.01 KG/M2

## 2024-01-25 DIAGNOSIS — G25.81 RESTLESS LEGS SYNDROME: ICD-10-CM

## 2024-01-25 DIAGNOSIS — F41.8 DEPRESSION WITH ANXIETY: ICD-10-CM

## 2024-01-25 DIAGNOSIS — E78.00 HYPERCHOLESTEROLEMIA: ICD-10-CM

## 2024-01-25 DIAGNOSIS — R41.3 MEMORY LOSS: Primary | ICD-10-CM

## 2024-01-25 PROCEDURE — 99483 ASSMT & CARE PLN PT COG IMP: CPT | Performed by: FAMILY MEDICINE

## 2024-01-25 NOTE — PROGRESS NOTES
PT Re-Evaluation     Today's date: 2024  Patient name: Jaguar Wilde  : 1950  MRN: [id number]  Referring provider: Nenita Renae CRNP  Dx:   Encounter Diagnosis     ICD-10-CM    1. Left leg weakness  R29.898                      Assessment  Assessment details: Jaguar Wilde is a 73 y.o. male with a history of GERD, chronic rhinitis, diverticulosis, dysphagia, hyperlipidemia, psoriatic arthritis, and RLS, that presents for a physical therapy RE evaluation.  The patient demonstrates signs and symptoms consistent with L LE weakness.  During the examination the patient demonstrated improved but impaired L LE strength, improved but impaired lumbar EXT ROM, and improved but impaired activity tolerance.  The patient has made functional gains since starting therapy.  The patient is now able to make it up half of a flight of steps prior to the onset of L LE weakness.  The patient notes persistent difficulty with carrying items and climbing the steps.The patient will benefit from continued skilled PT to address impairments, work towards goals, and restore patient PLOF.                Impairments: abnormal or restricted ROM, activity intolerance, impaired balance, impaired physical strength, lacks appropriate home exercise program and pain with function  Functional limitations: difficulty with prolonged standing, prolonged walking, walking on unlevel surfaces, difficulty squatting/kneeling, difficulty stair-climbing, and difficulty transferring from very low surfaces.  Symptom irritability: lowUnderstanding of Dx/Px/POC: good   Prognosis: good  Prognosis details: Positive prognostic indicators include: positive attitude toward recovery, good understanding of diagnosis/treatment plan, and absence of observed red flags.      Negative prognostic indicators include: Chronicity      Goals  STG: achieve in 4-6 weeks  1.  Patient's L LE strength improve by 1/2-1 muscle grade to improve ambulation up/down  the steps. PROGRESSING  2.  Patient's tandem stance balance improve to >20 seconds without LOB B/L LE. PROGRESSING  3. 30 second sit to stand score improve by 5 stands ( total 15 stands) to indicate improved transfers/ LE strength. PROGRESSING    LTG:  Achieve in 6-12 weeks  1.  Patient's knee FOTO score improve to > 75% to indicate a return to normal functioning. PROGRESSING  2.  Patient's SLB improve to > 30 sec without LOB to indicate improved balance/stability L LE. PROGRESSING  3.  Patient ambulate up /down steps normally and find out what the problem is regarding L LE weakness to achieve their personal goal. PROGRESSING  4. Patient to achieve independence with home exercise plan. PROGRESSING        Plan  Plan details: RE-ASSESS 1X/MONTH  Patient will come to PT 1x/week  Patient would benefit from: skilled physical therapy  Planned modality interventions: cryotherapy and thermotherapy: hydrocollator packs  Planned therapy interventions: manual therapy, neuromuscular re-education, patient education, postural training, self care, therapeutic activities, therapeutic exercise, home exercise program, abdominal trunk stabilization, balance, balance/weight bearing training, strengthening and joint mobilization  Frequency: 1-3x/wk.  Duration in weeks: 12  Plan of Care beginning date: 12/29/2023  Plan of Care expiration date: 3/29/2024  Treatment plan discussed with: PTA and patient        Subjective Evaluation    History of Present Illness  Date of onset: 6/21/2023  Mechanism of injury: SUBJECTIVE: 1/26/24: The patient notes improvement in his ability to climb steps since starting physical therapy.  The patient is now able to make it up half of a flight of steps prior to the onset of L LE weakness.  The patient notes persistent difficulty with carrying items and climbing the steps.  The patient will benefit from continued PT to achieve his goals of therapy.         INJURY HISTORY: Jaguar Wilde is a 73 y.o. male  that presents to outpatient physical therapy with complaints of L LE weakness when going up the steps.  The patient reports feeling like the L leg will give out which worsens with carrying items up/down the steps.  The patient notes numbness/tingling at L face, L hand, and L foot which the patient was told is related to stress/anxiety -  no CVA/TIA.  The patient reports Hx of EMG/NCV which was (-).  The patient notes onset 6 months prior without known cause.   Patient notes some pain wearing tied shoes at his L foot when wearing them all day versus wearing slip on shoes( ? Swelling). The patient's main goal for physical therapy is to find out the cause of the problem and rule out if this is a problem requiring more invasive intervention/consultation.     Patient Goals  Patient goals for therapy: increased motion, improved balance, increased strength, independence with ADLs/IADLs and return to sport/leisure activities  Patient goal: find out cause and need for further intervention ( progressing)  Pain  No pain reported    Social Support  Steps to enter house: yes  Stairs in house: yes   Lives in: multiple-level home  Lives with: spouse    Employment status: not working (retired )  Treatments  Current treatment: physical therapy      Objective     Concurrent Complaints  Negative for night pain, disturbed sleep, bladder dysfunction, bowel dysfunction, saddle (S4) numbness, history of cancer, history of trauma and infection    Postural Observations  Seated posture: fair  Standing posture: fair  Correction of posture: has no consistent effect      Neurological Testing     Sensation     Lumbar   Left   Intact: light touch    Right   Intact: light touch    Reflexes   Left   Patellar (L4): normal (2+)  Achilles (S1): normal (2+)  Babinski sign: negative    Right   Patellar (L4): normal (2+)  Achilles (S1): normal (2+)  Babinski sign: negative    Active Range of Motion     Lumbar   Flexion:  Restriction level:  minimal  Extension:  Restriction level: moderate  Left lateral flexion:  Restriction level: minimal  Right lateral flexion:  Restriction level: minimal    Additional Active Range of Motion Details  IMPROVED  Mechanical Assessment    Cervical      Thoracic      Lumbar    Standing flexion: repeated movements   Pain location:no change  Standing extension: repeated movements  Pain location: no change  Lying extension: repeated movements  Pain location: no change    Strength/Myotome Testing     Left Hip   Planes of Motion   Flexion: 4-  Abduction: 4  Adduction: 4    Right Hip   Planes of Motion   Flexion: 4+  Abduction: 4  Adduction: 4    Left Knee   Flexion: 3+  Extension: 4-    Right Knee   Flexion: 4  Extension: 4+    Left Ankle/Foot   Dorsiflexion: 5  Great toe extension: 5    Right Ankle/Foot   Dorsiflexion: 5  Great toe extension: 5    Additional Strength Details  IMPROVED BUT IMPAIRED L LE    Tests     Additional Tests Details  30 SSTS: 11 stands ( R lateral lean) (+) fatigue L thigh -  IMPROVED 1 stand    FOTO: 77% ( predicted 75%) IMPROVED    SLB: L: (+) LOB after 10 sec ( improved 8 sec)R: 16 sec (+) LOB             Precautions: L Quad Weakness  RE: 2/9 ( 2 weeks from RE)  Access Code [email or web address]   Specialty Daily Treatment Diary   BASELINE: AMB UP STEPS WEAK L QUADS/HAMS/HIP FLEXOR    Manual  12/29 1/5 1/12 1/26    Lumbar PA   AD AD grade 3-4    PPU with OP   AD  AD done                                Ther Ex 12/29 1/5 1/12 1/26    Standing lumbar EXT  FLEX X10 no change    X10 WORSE   X10    X10 WORSE    TM walk    2.0 mph x 5 mins    SLR w/ QS  *x10 B/L X15 B/L reviewed    Bridges  *:03x10 :03 x 10 reviewed    Hoist knee EXT  Knee Flex    *    ROXIE  Prone press ups x5  x10 X5  x10 X5  x10 x5    T-spine extensions *   x10    Nu -step towel roll for endurance S= 7  *L3 x5 min L3 x 8 min  L4 x 5 mins  Felt L LE pain/fatigue    Neuro Re-Ed        Alt step taps XL        Foam Balance feet together EO                   "     EC   EO 30\" x 1     EC 30\" x 1      TA  *x10 5\" x 10      Kegels  *x10      Multifidi  *x10      Glute sets  *x10 5\" x 10      CSMi weight shift/squat  balance *       Foam step overs *       Fitter Board front/side   X 10 ea      Towel roll education / posture education / movement precautions / HEP DONE AD       Heel walk / Toe walk  Test good B dir 10ft x1 ea  10ft x 1 ea    Heel-toe amb  *10 ft x4 10ft x 4  reviewed    Steam boats    1x10 ea B/L // bar    Quadruped DLS    nv            Mat walk        SLB 1x:30 B/L :30x2 B/L 30x2 B/L :30x1 B/L            Ther Activity        Chair Squats x10 *x10 HOLD x11    Up/down steps SOS    X5 4 steps SOS Crate carry   Reverse Lunge    X15 B/L mirror    Step ups fwd/side L *L x10 ea 6\" 2x10 ea  reviewed    Chair squats with step taps    NV    Wall sit    3x:30        Modalities                                               "

## 2024-01-25 NOTE — PROGRESS NOTES
[facility]  Consultation  [address] [facility] [address] [phone]      ASSESSMENT AND PLAN:  1. Memory loss  -     Ambulatory Referral to Senior Care  -     CBC; Future  -     Comprehensive metabolic panel; Future  -     TSH, 3rd generation with Free T4 reflex; Future  -     Vitamin B12/Folate, Serum Panel; Future  -     MRI brain NeuroQuant wo contrast; Future; Expected date: 01/25/2024  -     Neurotrax; Future    Jaguar has concerns about memory loss.   He has been independent with ADLS and IADLS. He has not lost any functionality.     His MOCA today is 28/30 , missing a point in serial 7s and a point on delayed recall.     However due to his concerns will further evaluate utilizing Neurotrax.     I will ask for MRI of the brain, neuro quantitative.     Discussed concern for anxiety/depression.     Ordered labs as outlined to include b12/folate.     Will follow up after testing/imaging at care conference.   2. Restless legs syndrome    Has been on low dose but regular use of klonopin. Although a low dose this may contribute to his cognition.      Discussed to discuss this further with his PCP. Recommend to consider other agents such as low dose of requip or gabapentin for treatment of RLS.   3. Hypercholesterolemia    On statin. Continue management per PCP  4. Depression with anxiety    Discussed concern for ongoing depression with anxiety. Suspect this is the underlying issue that is affecting his cognition.     Advised to reach out to his PCP to discuss this further . GDS is 8/15 and admits to a lot of anxiety that has worsened over the past year or few years. Consider initiation of medication , such as SSRI or therapy.         Decision-making capacity: intact    Staging: mild    Medications Review: reviewed. Concern for use of klonopin. Consider alternatives for management of RLS.     HPI:    We had the pleasure of evaluating Jaguar Wilde who is a 73 y.o. male in Geriatric  consultation today.  Mr. Wilde is in the office with his wife Suni.  He lives with wife    HISTORY AS PER patient and wife      Ravi is here today for a full geriatric evaluation with his wife Suzanna.  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    On review Ravi has been noted to be quite forgetful over the past 20 to 30 years.  His wife notes that he is easily distracted and sometimes appears to not be listening.    Ravi reports having worse forgetfulness over the last 2 to 3 years and his wife reports that he has been more forgetful over the last year.  His wife relates for example they were at [employer] and needing to stop at the pharmacy.  He forgot that he was going there and needed a reminder.  He he then proceeded to go to the pharmacy however a few minutes later forgot again that he was going to the pharmacy and planned on going to a different place.  On occasion he has had other episodes for he does not remember where he is going.    He has forgotten how to get to certain places that he has gone to 4 years.    His wife notes some repetitiveness but not a significant issue.  He has not had any hallucinations.  He has had some mood changes manifested as irritability and frustration.    Ravi does report some issues with depression especially over the last 2 to 3 years.  This has been coupled with significant anxiety.  Also getting worse.  He mainly worries about his adult children.  And does report that he gets worried with many things that he knows he should not be worried about.      COGNITION:  Memory Issues noticed since 2 year(s)    Memory affected: short term memory loss    Symptoms started: gradual  Over time the memory has:  worsened  Memory issue(s) were noted by: patient and family   Difficulty finding the right word while speaking: No  Requires repeat information or asking the same question repeatedly: Yes  Fluctuation in alertness: No  Changes in mood or  personality:Yes  Current or previous treatment for depression or anxiety: No    Family member with dementia and what type? no  History of head trauma: No  History of stroke: No  History of alcohol or substance abuse: No    FUNCTIONAL STATUS:  BADLs  Does patient require assistance with:  Bathing: No  Dressing: No  Transferring: No  Continence: No  Toileting: No  Feeding: No    IADLs  Dose patient require assistance with:  Telephone: No  Shopping: No  Food Preparation: No  Housekeeping: No  Laundry: No  Transportation: No  Medications: No  Finances: No    NEUROPSYCH SYMPTOMS:  Does patient get angry or hostile?  Resist care from others? No  Does patient see or hear things that no one else can see or hear? No  Does patient act impatient and cranky? Does mood frequently change for no apparent reason? No  Does patient act suspicious without good reason (example: believes that others are stealing from him or her, or planning to harm him or her in some way)? No  Does patient less interested in his or her usual activities or in the activities and plans of others? No  Does patient have trouble sleeping at night? No  Dose patient have abnormal movements while asleep? Yes    SAFETY:  Hearing and vision issue: Yes  Any gait or balance disorder: No  Uses:   Any falls in the last year: No  Any history of wandering: No  Are there firearms or guns in the home: No  Does patient drive: Yes  Any driving accidents or citations in the last year: No  Any concerns about patient's ability to drive: No    ACP REVIEW:  Does patient have POA: Yes  Does patient have a Living will: Yes  Any legal assistance needed for healthcare planning?: No    Allergies   Allergen Reactions   • Codeine        Medications:    Current Outpatient Medications on File Prior to Visit   Medication Sig Dispense Refill   • aspirin 81 MG tablet Take 81 mg by mouth     • CALCIUM/MAGNESIUM/ZINC FORMULA PO Take 1 tablet by mouth daily     • clobetasol (TEMOVATE) 0.05 %  ointment Apply topically as needed     • clonazePAM (KlonoPIN) 0.25 MG disintegrating tablet Take 1 tablet (0.25 mg total) by mouth 2 (two) times a day 60 tablet 0   • docusate sodium (QC Stool Softener) 100 mg capsule Take 1 capsule (100 mg total) by mouth daily 90 capsule 0   • finasteride (PROSCAR) 5 mg tablet Take 5 mg by mouth daily     • GARLIC PO Take by mouth daily     • levothyroxine 75 mcg tablet Take 1 tablet (75 mcg total) by mouth daily 90 tablet 0   • Multiple Vitamins-Minerals (LUTEIN-ZEAXANTHIN PO) Take 20 mg by mouth     • Multiple Vitamins-Minerals (MULTIVITAMIN PO) Take by mouth daily     • Omega-3 Fatty Acids (FISH OIL PO) Take by mouth daily     • omeprazole (PriLOSEC) 20 mg delayed release capsule Take 20 mg by mouth     • simvastatin (ZOCOR) 40 mg tablet Take 1 tablet (40 mg total) by mouth daily at bedtime 90 tablet 3   • tamsulosin (FLOMAX) 0.4 mg Take 2 capsules (0.8 mg total) by mouth daily with dinner 90 capsule 3   • [DISCONTINUED] Misc Natural Products (LUTEIN 20 PO) Take by mouth (Patient not taking: Reported on 1/25/2024)       No current facility-administered medications on file prior to visit.       History:  Past Medical History:   Diagnosis Date   • Benign neoplasm of colon    • Chronic rhinitis    • Colon polyp    • Diverticular disease of colon    • Dysphagia    • GERD (gastroesophageal reflux disease)    • Hyperlipidemia    • Internal hemorrhoids without complication    • PSA (psoriatic arthritis) (HCC)     elevated   • Restless leg syndrome    • Thyroid nodule      Past Surgical History:   Procedure Laterality Date   • COLONOSCOPY  09/22/2010   • COLONOSCOPY  06/13/2007   • COLONOSCOPY  05/27/2004   • COLONOSCOPY  12/09/2015   • GALLBLADDER SURGERY     • OTHER SURGICAL HISTORY  11/2012    abcessed tooth   • THYROIDECTOMY     • TONSILLECTOMY     • UPPER GASTROINTESTINAL ENDOSCOPY  10/30/2013     Family History   Problem Relation Age of Onset   • Colon cancer Father    • Other  "Father         metastatic: lymph tissue, breast, thyroid, prostate, liver, lung   • Other Family         malignant neoplasm of gastrointestinal disease     Social History     Socioeconomic History   • Marital status: /Civil Union     Spouse name: Not on file   • Number of children: Not on file   • Years of education: Not on file   • Highest education level: Not on file   Occupational History   • Not on file   Tobacco Use   • Smoking status: Never   • Smokeless tobacco: Never   Vaping Use   • Vaping status: Never Used   Substance and Sexual Activity   • Alcohol use: No   • Drug use: No   • Sexual activity: Not on file   Other Topics Concern   • Not on file   Social History Narrative    Consumes on average 3 cups of regular coffee per day.     Social Determinants of Health     Financial Resource Strain: Low Risk  (12/8/2023)    Overall Financial Resource Strain (CARDIA)    • Difficulty of Paying Living Expenses: Not hard at all   Food Insecurity: Not on file   Transportation Needs: No Transportation Needs (12/8/2023)    PRAPARE - Transportation    • Lack of Transportation (Medical): No    • Lack of Transportation (Non-Medical): No   Physical Activity: Not on file   Stress: Not on file   Social Connections: Not on file   Intimate Partner Violence: Not on file   Housing Stability: Not on file     Past Surgical History:   Procedure Laterality Date   • COLONOSCOPY  09/22/2010   • COLONOSCOPY  06/13/2007   • COLONOSCOPY  05/27/2004   • COLONOSCOPY  12/09/2015   • GALLBLADDER SURGERY     • OTHER SURGICAL HISTORY  11/2012    abcessed tooth   • THYROIDECTOMY     • TONSILLECTOMY     • UPPER GASTROINTESTINAL ENDOSCOPY  10/30/2013       OBJECTIVE:  Vitals:    01/25/24 1059   BP: 120/70   BP Location: Left arm   Patient Position: Sitting   Cuff Size: Standard   Pulse: 92   Temp: 98.3 °F (36.8 °C)   TempSrc: Temporal   SpO2: 98%   Weight: 66.6 kg (146 lb 12.8 oz)   Height: 5' 3\" (1.6 m)     Body mass index is 26 " kg/m².  Physical Exam  Vitals reviewed.   Constitutional:       General: He is not in acute distress.     Appearance: Normal appearance. He is well-developed.   HENT:      Head: Normocephalic and atraumatic.      Right Ear: Tympanic membrane, ear canal and external ear normal.      Left Ear: Tympanic membrane, ear canal and external ear normal.      Nose: Nose normal.      Mouth/Throat:      Mouth: Mucous membranes are moist.   Eyes:      Conjunctiva/sclera: Conjunctivae normal.      Pupils: Pupils are equal, round, and reactive to light.   Cardiovascular:      Rate and Rhythm: Normal rate and regular rhythm.      Heart sounds: Normal heart sounds. No murmur heard.     No friction rub. No gallop.   Pulmonary:      Effort: Pulmonary effort is normal. No respiratory distress.      Breath sounds: Normal breath sounds. No wheezing or rales.   Chest:      Chest wall: No tenderness.   Abdominal:      General: Bowel sounds are normal. There is no distension.      Palpations: Abdomen is soft. There is no mass.      Tenderness: There is no abdominal tenderness. There is no guarding or rebound.   Musculoskeletal:         General: Normal range of motion.      Cervical back: Normal range of motion and neck supple.   Skin:     General: Skin is warm.      Capillary Refill: Capillary refill takes less than 2 seconds.   Neurological:      General: No focal deficit present.      Mental Status: He is alert and oriented to person, place, and time.      Cranial Nerves: Cranial nerves 2-12 are intact.      Sensory: Sensation is intact.      Motor: Motor function is intact.      Comments: TUGS: < 12 seconds   Psychiatric:         Mood and Affect: Mood normal.         Behavior: Behavior normal.         Thought Content: Thought content normal.         Judgment: Judgment normal.         MoCA: 28/30  Geriatric Depression Screen      Flowsheet Row Most Recent Value   Geriatric Depression Scale (Short Version) Total 8            Labs &  "Imaging:  Lab Results   Component Value Date    WBC 6.09 05/31/2023    HGB 15.2 05/31/2023    HCT 45.3 05/31/2023    MCV 94 05/31/2023     05/31/2023     Lab Results   Component Value Date     05/07/2018    SODIUM 136 05/31/2023    K 4.1 05/31/2023     (H) 05/31/2023    CO2 26 05/31/2023    ANIONGAP 13.4 05/07/2018    AGAP 0 (L) 05/31/2023    BUN 15 05/31/2023    CREATININE 0.85 05/31/2023    GLUC 122 03/17/2019    GLUF 108 (H) 05/31/2023    CALCIUM 8.7 05/31/2023    AST 18 05/31/2023    ALT 30 05/31/2023    ALKPHOS 43 (L) 05/31/2023    PROT 6.4 05/07/2018    TP 6.8 05/31/2023    BILITOT 0.5 05/07/2018    TBILI 0.38 05/31/2023    EGFR 87 05/31/2023     Lab Results   Component Value Date    HGBA1C 5.5 12/08/2023     Lab Results   Component Value Date    CHOLESTEROL 130 05/31/2023    CHOLESTEROL 127 03/10/2023    CHOLESTEROL 129 11/08/2022     Lab Results   Component Value Date    HDL 51 05/31/2023    HDL 45 03/10/2023    HDL 46 11/08/2022     Lab Results   Component Value Date    TRIG 61 05/31/2023    TRIG 72 03/10/2023    TRIG 75 11/08/2022     Lab Results   Component Value Date    NONHDLC 79 05/31/2023    NONHDLC 82 03/10/2023    NONHDLC 83 11/08/2022     Lab Results   Component Value Date    LDLCALC 67 05/31/2023    LDLCALC 68 03/10/2023     Lab Results   Component Value Date    VTNYSZGR00 515 03/10/2022     Lab Results   Component Value Date    IAF3ETSBXXJI 2.170 05/31/2023     No results found for: \"SYPHILISAB\"  No results found for: \"RPR\"      Lab Results   Component Value Date    AMDX20XYOJKY 35.3 03/10/2022      Results for orders placed during the hospital encounter of 03/17/19    CT head without contrast    Narrative  CT BRAIN - WITHOUT CONTRAST    INDICATION:   Dizziness.    COMPARISON:  None.    TECHNIQUE:  CT examination of the brain was performed.  In addition to axial images, coronal 2D reformatted images were created and submitted for interpretation.    Radiation dose length product " (DLP) for this visit:  876.8 mGy-cm .  This examination, like all CT scans performed in the [facility], was performed utilizing techniques to minimize radiation dose exposure, including the use of iterative  reconstruction and automated exposure control.    IMAGE QUALITY:  Diagnostic.    FINDINGS:    PARENCHYMA:  No acute intracranial hemorrhage or mass effect.    No evidence of acute vascular territorial infarction.    VENTRICLES AND EXTRA-AXIAL SPACES:  No hydrocephalus or extra-axial collection.    VISUALIZED ORBITS AND PARANASAL SINUSES:  Intact globes and orbits.  No visualized paranasal sinus disease.    CALVARIUM AND EXTRACRANIAL SOFT TISSUES:  No lytic or blastic lesion or soft tissue mass.    Impression  No acute intracranial abnormality.            Workstation performed: [id number]    No results found for this or any previous visit.    No results found for this or any previous visit.    Results for orders placed in visit on 12/25/19    MRI brain wo contrast    Narrative  [id number]    No results found for this or any previous visit.    No results found for this or any previous visit.      I have spent 62 minutes with Patient and family today including taking history from family, patient, review of records, charting, and counseling regarding diagnosis and management of conditions.

## 2024-01-25 NOTE — PROGRESS NOTES
[facility]  [address], [address]  [place] [address]  [phone]    Social Work Intake    Jaguar Wilde presents today for a geriatric assessment, accompanied by ***.     Social/Family History   Marital status: {SC Marital Status:69020}    Does patient have children? {SC Children Yes/No:66871} (If yes, where do the children live?)   Family members assisting patient at home: ***   Are any relationships causing problems right now: {SC yes/no:44039}   Who is available to provide care in case of illness or crisis (please specify relation to patient / level of care able to provide)? ***        Employment and Education   Education: {Highest Level of Education:26735:::1}   Currently Employed: {SC yes/no:25301}    Retired: {SC retired yes/no:92443}   Type of employment: ***   : {SC  Y/N:01974}   [place] Benefits (if applicable): ***       Lifestyle/Community Services   Clubs and Organizations: ***   Major life events in past two years (ex: intermediate, death in family, major illness etc.): ***    Have you ever used a home care agency, meal delivery service, or respite care?: ***   Services that assessment team feels would be beneficial to patient/family: ***      Financial   Total Monthly income: ***     Source of income: ***   Meet current needs? {SC yes/no:57603}    Funds available for home care? {SC yes/no:69916}    Funds available for nursing home? {SC yes/no:62872}    Do you rent or own your home? {senior care housing (Optional):69378}       End-Of-Life Checklist   Have wishes or desires for end-of-life care been discussed? ***   {Advanced directives:03559}       For all patients, we encourage seeing a  to establish a Financial Power of , a Health Care Power of , and an Advanced Directive (living will). Particularly for patients experiencing memory concerns, we strongly recommend that this is completed as soon as possible.       Safety  "Assessment   Is the patient still driving? {SC yes/no:08354}    Is the patient taking medications as prescribed? {SC yes/no:72465}    Is there a system in place for medication management? ***  Are firearms or weapons in the home? ***  Recommendations per [employer]: removing them from the home, keep ammunition stored separately, encourage patient to consider \"gifting\" them, if necessary, ask local law enforcement for assistance in removing the firearms from the home. The family may receive compensation from a gun buy-back program.    Does the patient live alone? ***  What is the layout of the home? ***   Do you feel comfortable leaving the person home alone? {SC yes/no:93210}   Have you noticed any burned pans or other signs of issues with the stove or other appliances? {SC yes/no:28367}   Do you have any concerns about the person’s cooking or eating habits? {SC yes/no:84111}   Are there working smoke detectors and fire extinguishers in the home? {SC yes/no:29601}   Have you thought about when it will no longer be safe for the patient to live alone? ***       [place] Cognitive Assessment (MoCA) Version ***  Education: ***    Points Earned POSSIBLE Points   Visuospatial/Executive   Alternating [place] Making *** 1   Visuoconstructional skills *** 1   Visuoconstructional skills (clock) *** 3   Naming   Naming Animals *** 3   Attention   Digit Span *** 2   Vigilance (letters) *** 1   Serial 7 subtraction *** 3   Language   Sentence Repetition *** 2   Verbal fluency *** 1   Abstraction   Abstraction (word pairings) *** 2   Delayed recall   Delayed recall *** 5   Memory index score: ***/15   Orientation   Orientation *** 6   TOTAL SCORE: ***/30  (Normal ?26/30)   Additional notes:        Geriatric Depression Scale (GDS) completed today, ***/15.   "

## 2024-01-26 ENCOUNTER — EVALUATION (OUTPATIENT)
Dept: PHYSICAL THERAPY | Facility: CLINIC | Age: 74
End: 2024-01-26
Payer: MEDICARE

## 2024-01-26 DIAGNOSIS — R29.898 LEFT LEG WEAKNESS: Primary | ICD-10-CM

## 2024-01-26 PROCEDURE — 97140 MANUAL THERAPY 1/> REGIONS: CPT | Performed by: PHYSICAL THERAPIST

## 2024-01-26 PROCEDURE — 97112 NEUROMUSCULAR REEDUCATION: CPT | Performed by: PHYSICAL THERAPIST

## 2024-01-26 PROCEDURE — 97530 THERAPEUTIC ACTIVITIES: CPT | Performed by: PHYSICAL THERAPIST

## 2024-01-26 PROCEDURE — 97110 THERAPEUTIC EXERCISES: CPT | Performed by: PHYSICAL THERAPIST

## 2024-01-27 ENCOUNTER — APPOINTMENT (OUTPATIENT)
Dept: LAB | Facility: CLINIC | Age: 74
End: 2024-01-27
Payer: MEDICARE

## 2024-01-27 DIAGNOSIS — Z00.00 MEDICARE ANNUAL WELLNESS VISIT, SUBSEQUENT: ICD-10-CM

## 2024-01-27 DIAGNOSIS — K21.00 GASTROESOPHAGEAL REFLUX DISEASE WITH ESOPHAGITIS, UNSPECIFIED WHETHER HEMORRHAGE: ICD-10-CM

## 2024-01-27 DIAGNOSIS — R41.3 MEMORY LOSS: ICD-10-CM

## 2024-01-27 DIAGNOSIS — E78.00 HYPERCHOLESTEROLEMIA: ICD-10-CM

## 2024-01-27 DIAGNOSIS — E03.8 OTHER SPECIFIED HYPOTHYROIDISM: ICD-10-CM

## 2024-01-27 LAB
ALBUMIN SERPL BCP-MCNC: 4 G/DL (ref 3.5–5)
ALP SERPL-CCNC: 39 U/L (ref 34–104)
ALT SERPL W P-5'-P-CCNC: 20 U/L (ref 7–52)
ANION GAP SERPL CALCULATED.3IONS-SCNC: 7 MMOL/L
AST SERPL W P-5'-P-CCNC: 25 U/L (ref 13–39)
BILIRUB SERPL-MCNC: 0.55 MG/DL (ref 0.2–1)
BUN SERPL-MCNC: 14 MG/DL (ref 5–25)
CALCIUM SERPL-MCNC: 9.1 MG/DL (ref 8.4–10.2)
CHLORIDE SERPL-SCNC: 105 MMOL/L (ref 96–108)
CO2 SERPL-SCNC: 28 MMOL/L (ref 21–32)
CREAT SERPL-MCNC: 0.8 MG/DL (ref 0.6–1.3)
ERYTHROCYTE [DISTWIDTH] IN BLOOD BY AUTOMATED COUNT: 11.8 % (ref 11.6–15.1)
FOLATE SERPL-MCNC: 21.6 NG/ML
GFR SERPL CREATININE-BSD FRML MDRD: 89 ML/MIN/1.73SQ M
GLUCOSE P FAST SERPL-MCNC: 91 MG/DL (ref 65–99)
HCT VFR BLD AUTO: 46.1 % (ref 36.5–49.3)
HGB BLD-MCNC: 15.6 G/DL (ref 12–17)
MCH RBC QN AUTO: 32 PG (ref 26.8–34.3)
MCHC RBC AUTO-ENTMCNC: 33.8 G/DL (ref 31.4–37.4)
MCV RBC AUTO: 95 FL (ref 82–98)
PLATELET # BLD AUTO: 185 THOUSANDS/UL (ref 149–390)
PMV BLD AUTO: 9.9 FL (ref 8.9–12.7)
POTASSIUM SERPL-SCNC: 4.2 MMOL/L (ref 3.5–5.3)
PROT SERPL-MCNC: 6.4 G/DL (ref 6.4–8.4)
RBC # BLD AUTO: 4.87 MILLION/UL (ref 3.88–5.62)
SODIUM SERPL-SCNC: 140 MMOL/L (ref 135–147)
TSH SERPL DL<=0.05 MIU/L-ACNC: 2.12 UIU/ML (ref 0.45–4.5)
VIT B12 SERPL-MCNC: 408 PG/ML (ref 180–914)
WBC # BLD AUTO: 5.73 THOUSAND/UL (ref 4.31–10.16)

## 2024-01-27 PROCEDURE — 84443 ASSAY THYROID STIM HORMONE: CPT

## 2024-01-27 PROCEDURE — 82607 VITAMIN B-12: CPT

## 2024-01-27 PROCEDURE — 82746 ASSAY OF FOLIC ACID SERUM: CPT

## 2024-01-27 PROCEDURE — 80053 COMPREHEN METABOLIC PANEL: CPT

## 2024-01-27 PROCEDURE — 36415 COLL VENOUS BLD VENIPUNCTURE: CPT

## 2024-01-27 PROCEDURE — 85027 COMPLETE CBC AUTOMATED: CPT

## 2024-02-02 ENCOUNTER — OFFICE VISIT (OUTPATIENT)
Dept: PHYSICAL THERAPY | Facility: CLINIC | Age: 74
End: 2024-02-02
Payer: MEDICARE

## 2024-02-02 DIAGNOSIS — R29.898 LEFT LEG WEAKNESS: Primary | ICD-10-CM

## 2024-02-02 PROCEDURE — 97110 THERAPEUTIC EXERCISES: CPT | Performed by: PHYSICAL THERAPIST

## 2024-02-02 PROCEDURE — 97530 THERAPEUTIC ACTIVITIES: CPT | Performed by: PHYSICAL THERAPIST

## 2024-02-02 NOTE — PROGRESS NOTES
"Daily Note     Today's date: 2024  Patient name: Jaguar Wilde  : 1950  MRN: [id number]  Referring provider: Nenita Renae CRNP  Dx:   Encounter Diagnosis     ICD-10-CM    1. Left leg weakness  R29.898                      Subjective: The patient denies pain and notes improvement with ambulating up/down the steps.      Objective: See treatment diary below      Assessment: The patient has progressed since last week with all of the new activities we performed.  The patient was shown new activities to challenge strength and balance at his L LE.  The patient tolerated the treatment well with no complaints of pain increase and mild fatigue L LE.  The patient will be re-assessed for probable discharge NV.      Plan: Progress note during next visit.  Potential discharge next visit.     Precautions: L Quad Weakness  RE:  ( 2 weeks from RE)  Access Code [email or web address]   Specialty Daily Treatment Diary   BASELINE: AMB UP STEPS WEAK L QUADS/HAMS/HIP FLEXOR    Manual   2/2   Lumbar PA   AD AD grade 3-4    PPU with OP   AD  AD done                                Ther Ex  2/2   Standing lumbar EXT  FLEX X10 no change    X10 WORSE   X10    X10 WORSE    TM walk    2.0 mph x 5 mins Walked this morning   SLR w/ QS  *x10 B/L X15 B/L reviewed    Bridges  *:03x10 :03 x 10 reviewed    Hoist knee EXT  Knee Flex    * 2pl 2x10  3pl 2x10   ROXIE  Prone press ups x5  x10 X5  x10 X5  x10 x5 X 30 sec  x10   T-spine extensions *   x10 X10 elbows up   Nu -step towel roll for endurance S= 7  *L3 x5 min L3 x 8 min  L4 x 5 mins  Felt L LE pain/fatigue L4 x 3 mins p! L LE   Bike S=7     L1 x 5 mins   Neuro Re-Ed        Alt step taps XL        Foam Balance feet together EO                       EC   EO 30\" x 1     EC 30\" x 1      TA  *x10 5\" x 10      Kegels  *x10      Multifidi  *x10      Glute sets  *x10 5\" x 10      CSMi weight shift/squat  balance *       Foam step overs *       Fitter Board " "front/side   X 10 ea      Towel roll education / posture education / movement precautions / HEP DONE AD       Heel walk / Toe walk  Test good B dir 10ft x1 ea  10ft x 1 ea    Heel-toe amb  *10 ft x4 10ft x 4  reviewed    Steam boats    1x10 ea B/L // bar 1x15 ea   Quadruped DLS    nv UE x10           Mat walk        SLB 1x:30 B/L :30x2 B/L 30x2 B/L :30x1 B/L            Ther Activity        Chair Squats x10 *x10 HOLD x11 ECCENTRIC x15 hand L knee   Up/down steps SOS    X5 4 steps SOS Crate carry0# x3 SOS  5# x3 SOS   Reverse Lunge    X15 B/L mirror X2 checked form   Step ups fwd/side L *L x10 ea 6\" 2x10 ea  reviewed    Chair squats with step taps    NV x15   Wall sit    3x:30 reviewed       Modalities                                           "

## 2024-02-09 ENCOUNTER — OFFICE VISIT (OUTPATIENT)
Dept: PHYSICAL THERAPY | Facility: CLINIC | Age: 74
End: 2024-02-09
Payer: MEDICARE

## 2024-02-09 DIAGNOSIS — N40.0 BENIGN PROSTATIC HYPERPLASIA WITHOUT LOWER URINARY TRACT SYMPTOMS: Primary | ICD-10-CM

## 2024-02-09 DIAGNOSIS — E03.9 HYPOTHYROIDISM, UNSPECIFIED TYPE: ICD-10-CM

## 2024-02-09 DIAGNOSIS — K59.00 CONSTIPATION, UNSPECIFIED CONSTIPATION TYPE: ICD-10-CM

## 2024-02-09 DIAGNOSIS — R29.898 LEFT LEG WEAKNESS: Primary | ICD-10-CM

## 2024-02-09 PROCEDURE — 97110 THERAPEUTIC EXERCISES: CPT | Performed by: PHYSICAL THERAPIST

## 2024-02-09 PROCEDURE — 97112 NEUROMUSCULAR REEDUCATION: CPT | Performed by: PHYSICAL THERAPIST

## 2024-02-09 PROCEDURE — 97530 THERAPEUTIC ACTIVITIES: CPT | Performed by: PHYSICAL THERAPIST

## 2024-02-09 RX ORDER — LEVOTHYROXINE SODIUM 0.07 MG/1
75 TABLET ORAL DAILY
Qty: 90 TABLET | Refills: 2 | Status: SHIPPED | OUTPATIENT
Start: 2024-02-09

## 2024-02-09 RX ORDER — FINASTERIDE 5 MG/1
5 TABLET, FILM COATED ORAL DAILY
Qty: 90 TABLET | Refills: 2 | Status: SHIPPED | OUTPATIENT
Start: 2024-02-09

## 2024-02-09 RX ORDER — DOCUSATE SODIUM 100 MG/1
100 CAPSULE, LIQUID FILLED ORAL DAILY
Qty: 90 CAPSULE | Refills: 2 | Status: SHIPPED | OUTPATIENT
Start: 2024-02-09

## 2024-02-09 NOTE — TELEPHONE ENCOUNTER
Patient requesting refill(s) of: Finasteride     Last filled: 2/15/23  Last appt: 12/13/23  Next appt: 6/13/24  Pharmacy: [facility]       Patient requesting refill(s) of: Levothyroxine     Last filled: 11/9/23      Patient requesting refill(s) of: stool softener     Last filled: 11/9/23

## 2024-02-09 NOTE — PROGRESS NOTES
PT Re-Evaluation  and PT Discharge    Today's date: 2024  Patient name: Jaguar Wilde  : 1950  MRN: [id number]  Referring provider: Nenita Renae CRNP  Dx:   Encounter Diagnosis     ICD-10-CM    1. Left leg weakness  R29.898                      Assessment  Assessment details: Jaguar Wilde is a 73 y.o. male with a history of GERD, chronic rhinitis, diverticulosis, dysphagia, hyperlipidemia, psoriatic arthritis, and RLS, that presents for a physical therapy RE evaluation/ DISCHARGE.  The patient demonstrates signs and symptoms consistent with L LE weakness.  During the examination the patient demonstrated improved L LE strength, improved but impaired lumbar EXT ROM, and improved activity tolerance.  The patient has made functional gains since starting therapy.  The patient is now making it up the steps 3/4 of the way without symptoms.  The patient notes improvement with walking on unlevel surfaces.  The patient has incorporated his exercises into his morning walk.  The patient feels independent with his HEP at this time and feels ready for discharge.                    Symptom irritability: lowUnderstanding of Dx/Px/POC: good   Prognosis: good  Prognosis details: Positive prognostic indicators include: positive attitude toward recovery, good understanding of diagnosis/treatment plan, and absence of observed red flags.      Negative prognostic indicators include: Chronicity      Goals  STG: achieve in 4-6 weeks  1.  Patient's L LE strength improve by 1/2-1 muscle grade to improve ambulation up/down the steps. MET  2.  Patient's tandem stance balance improve to >20 seconds without LOB B/L LE. MET  3. 30 second sit to stand score improve by 5 stands ( total 15 stands) to indicate improved transfers/ LE strength. MET    LTG:  Achieve in 6-12 weeks  1.  Patient's knee FOTO score improve to > 75% to indicate a return to normal functioning. MET  2.  Patient's SLB improve to > 30 sec without LOB  to indicate improved balance/stability L LE. PARTIALLY MET  3.  Patient ambulate up /down steps normally and find out what the problem is regarding L LE weakness to achieve their personal goal. MET  4. Patient to achieve independence with home exercise plan. MET        Plan  Plan details: D/C PT TO AN INDEPENDENT HEP  Treatment plan discussed with: PTA and patient        Subjective Evaluation    History of Present Illness  Date of onset: 6/21/2023  Mechanism of injury: SUBJECTIVE: 2/9/24: The patient is now making it up the steps 3/4 of the way without symptoms.  The patient notes improvement with walking on unlevel surfaces.  The patient has incorporated his exercises into his morning walk.  The patient feels independent with his HEP at this time and feels ready for discharge.        SUBJECTIVE: 1/26/24: The patient notes improvement in his ability to climb steps since starting physical therapy.  The patient is now able to make it up half of a flight of steps prior to the onset of L LE weakness.  The patient notes persistent difficulty with carrying items and climbing the steps.  The patient will benefit from continued PT to achieve his goals of therapy.         INJURY HISTORY: Jaguar Wilde is a 73 y.o. male that presents to outpatient physical therapy with complaints of L LE weakness when going up the steps.  The patient reports feeling like the L leg will give out which worsens with carrying items up/down the steps.  The patient notes numbness/tingling at L face, L hand, and L foot which the patient was told is related to stress/anxiety -  no CVA/TIA.  The patient reports Hx of EMG/NCV which was (-).  The patient notes onset 6 months prior without known cause.   Patient notes some pain wearing tied shoes at his L foot when wearing them all day versus wearing slip on shoes( ? Swelling). The patient's main goal for physical therapy is to find out the cause of the problem and rule out if this is a problem  requiring more invasive intervention/consultation.     Patient Goals  Patient goal: find out cause and need for further intervention ( MET)  Pain  No pain reported    Social Support  Steps to enter house: yes  Stairs in house: yes   Lives in: multiple-level home  Lives with: spouse    Employment status: not working (retired )  Treatments  Previous treatment: physical therapy      Objective     Concurrent Complaints  Negative for night pain, disturbed sleep, bladder dysfunction, bowel dysfunction, saddle (S4) numbness, history of cancer, history of trauma and infection    Postural Observations  Seated posture: fair  Standing posture: fair  Correction of posture: has no consistent effect      Neurological Testing     Sensation     Lumbar   Left   Intact: light touch    Right   Intact: light touch    Reflexes   Left   Patellar (L4): normal (2+)  Achilles (S1): normal (2+)  Babinski sign: negative    Right   Patellar (L4): normal (2+)  Achilles (S1): normal (2+)  Babinski sign: negative    Active Range of Motion     Lumbar   Flexion:  Restriction level: minimal  Extension:  Restriction level: minimal  Left lateral flexion:  Restriction level: minimal  Right lateral flexion:  Restriction level: minimal    Additional Active Range of Motion Details  IMPROVED  Mechanical Assessment    Cervical      Thoracic      Lumbar    Standing flexion: repeated movements   Pain location:no change  Standing extension: repeated movements  Pain location: no change  Lying extension: repeated movements  Pain location: no change    Strength/Myotome Testing     Left Hip   Planes of Motion   Flexion: 4-  Abduction: 4  Adduction: 4    Right Hip   Planes of Motion   Flexion: 4+  Abduction: 4  Adduction: 4    Left Knee   Flexion: 4  Extension: 4    Right Knee   Flexion: 4  Extension: 4+    Left Ankle/Foot   Dorsiflexion: 5  Great toe extension: 5    Right Ankle/Foot   Dorsiflexion: 5  Great toe extension: 5    Additional Strength  "Details  IMPROVED BUT IMPAIRED L LE    Tests     Additional Tests Details  30 SSTS: 15 stands  (+) fatigue L thigh -  IMPROVED 5 stand    FOTO: 77% ( predicted 75%) IMPROVED    SLB: L: 30 SEC R: 30 sec              Precautions: L Quad Weakness  RE: 2/9 ( 2 weeks from RE)  Access Code [email or web address]   Specialty Daily Treatment Diary   BASELINE: AMB UP STEPS WEAK L QUADS/HAMS/HIP FLEXOR    Manual  2/9 1/5 1/12 1/26 2/2   Lumbar PA   AD AD grade 3-4    PPU with OP   AD  AD done                                Ther Ex 2/9 1/5 1/12 1/26 2/2   Standing lumbar EXT  FLEX    X10    X10 WORSE    TM walk    2.0 mph x 5 mins Walked this morning   SLR w/ QS  *x10 B/L X15 B/L reviewed    Bridges  *:03x10 :03 x 10 reviewed    Hoist knee EXT  Knee Flex 2PL 2X10  3PL 2X10   * 2pl 2x10  3pl 2x10   ROXIE  Prone press ups  X5  x10 X5  x10 x5 X 30 sec  x10   T-spine extensions    x10 X10 elbows up   Nu -step towel roll for endurance S= 7  *L3 x5 min L3 x 8 min  L4 x 5 mins  Felt L LE pain/fatigue L4 x 3 mins p! L LE   Bike S=7 L1 x 8 mins    L1 x 5 mins   Neuro Re-Ed        Alt step taps XL        Foam Balance feet together EO                       EC Tandem 1x:30 B/L  EO 30\" x 1     EC 30\" x 1      TA  *x10 5\" x 10      Kegels  *x10      Multifidi  *x10      Glute sets  *x10 5\" x 10      CSMi weight shift/squat  balance        Foam step overs        Fitter Board front/side   X 10 ea      Towel roll education / posture education / movement precautions / HEP        Heel walk / Toe walk  Test good B dir 10ft x1 ea  10ft x 1 ea    Heel-toe amb 10ft x 2 *10 ft x4 10ft x 4  reviewed    Steam boats reviewed   1x10 ea B/L // bar 1x15 ea   Quadruped DLS reviewed   nv UE x10           Mat walk        SLB 1x:30 B/L :30x2 B/L 30x2 B/L :30x1 B/L            Ther Activity        Chair Squats X15  Reviewed  eccentric for HEP and progression *x10 HOLD x11 ECCENTRIC x15 hand L knee   Up/down steps SOS    X5 4 steps SOS Crate carry0# x3 SOS  5# x3 SOS   Reverse " "Jose discussed   X15 B/L mirror X2 checked form   Step ups fwd/side  *L x10 ea 6\" 2x10 ea  reviewed    Chair squats with step taps    NV x15   Wall sit Reviewed progression   3x:30 reviewed       Modalities                                                 "

## 2024-02-15 ENCOUNTER — OFFICE VISIT (OUTPATIENT)
Age: 74
End: 2024-02-15
Payer: MEDICARE

## 2024-02-15 DIAGNOSIS — G25.81 RESTLESS LEGS SYNDROME: ICD-10-CM

## 2024-02-15 DIAGNOSIS — R41.3 MEMORY LOSS: Primary | ICD-10-CM

## 2024-02-15 PROCEDURE — 96138 PSYCL/NRPSYC TECH 1ST: CPT | Performed by: FAMILY MEDICINE

## 2024-02-15 RX ORDER — CLONAZEPAM 0.25 MG/1
0.25 TABLET, ORALLY DISINTEGRATING ORAL 2 TIMES DAILY
Qty: 60 TABLET | Refills: 0 | Status: SHIPPED | OUTPATIENT
Start: 2024-02-15

## 2024-02-15 NOTE — TELEPHONE ENCOUNTER
Patient requesting refill(s) of: Clonazepam     Last filled: 1/16/24  Last appt: 12/13/23  Next appt: 6/13/24  Pharmacy: [facility]

## 2024-02-15 NOTE — PROGRESS NOTES
[address]  [facility] [address]  [phone]    Jaguar Wilde was here today for Neurotrax comprehensive test. It took the patient 43 minutes to complete.

## 2024-02-26 ENCOUNTER — HOSPITAL ENCOUNTER (OUTPATIENT)
Dept: MRI IMAGING | Facility: HOSPITAL | Age: 74
Discharge: HOME | End: 2024-02-26
Payer: MEDICARE

## 2024-02-26 DIAGNOSIS — R41.3 MEMORY LOSS: ICD-10-CM

## 2024-02-26 PROCEDURE — G1004 CDSM NDSC: HCPCS

## 2024-02-26 PROCEDURE — 70551 MRI BRAIN STEM W/O DYE: CPT

## 2024-02-27 DIAGNOSIS — N40.0 BENIGN PROSTATIC HYPERPLASIA WITHOUT LOWER URINARY TRACT SYMPTOMS: ICD-10-CM

## 2024-02-27 RX ORDER — TAMSULOSIN HYDROCHLORIDE 0.4 MG/1
0.8 CAPSULE ORAL
Qty: 90 CAPSULE | Refills: 0 | Status: SHIPPED | OUTPATIENT
Start: 2024-02-27

## 2024-02-27 NOTE — TELEPHONE ENCOUNTER
Patient requesting refill(s) of: Flomax     Last filled: 12/5/23  Last appt: 12/13/23  Next appt: 6/13/24  Ph[facility]

## 2024-03-05 NOTE — PROGRESS NOTES
[facility]  [address], [address]  [place] [address]  [phone]    [facility]    NAME: Jaguar Wilde  AGE: 73 y.o. SEX: male  YOB: 1950  DATE OF ASSESSMENT: 01/25/24  DATE OF CONFERENCE: 03/07/24    Family Present: Suni, wife.   Staff Present: Julian Moreira MD    Patient / Family Goals of Care: Review cognitive decline and associated symptoms, discuss treatment options and care planning.     Medical Concerns (Current/Historical): restless leg syndrome, hypercholesterolemia, depression with anxiety    Geriatric Syndromes/Age Related Syndromes:     Neuropsychological  Memory loss  [place] Cognitive Assessment: 28/30  Geriatric Depression Screen: 8/15  NeuroTrax:  106.9 (global cognitive score)  100.9 (memory)   109.2 (executive function)  101.6 (attention)   99.2 (information processing speed)  111.3 (visual spatial)  112.9 (verbal function)  113.5 (motor skills)  Reviewed Jaguar's concern for memory loss. At this time there is no evidence of cognitive decline. To review his MOCA was normal at 28/30. He scored well on Neurotrax with a global score of 106. He remains independent of adls and iadls.  His MRI was reviewed and there is no support for a neurodegenerative disease.     Discussed that ongoing depression and anxiety may cause some cognitive impairments. His GDS was 8/15 and admits to some issues with depression but mostly with Anxiety. I did recommend that he review and discuss this with his PCP further. Perhaps he should consider therapy or counseling.     Decision-making capacity: Intact  Staging: n/a    Remain active physically, mentally and socially  Engage in cognitively challenging exercises such as crosswords and puzzles  Maintain chronic conditions under control  Will follow up as needed.   Diagnostic Studies  Review of bloodwork: folate and b12 are normal.     Review of MRI NeuroQuant: IMPRESSION:Minimal white matter changes  suggestive of chronic microangiopathy.  No acute intracranial pathology.NeuroQuant analysis was performed: Normal study; Does not support neurodegeneration    Physical Finding Impacting Function   Fall Risk: minimal   Activities of Daily Living: Independent   Instrumental Activities of Daily Living: Independent    Encourage appropriate footwear at all times  Review fall risk prevention tips and adjust within the home environment as needed    Medications Reviewed   Reviewed. Concern for use of Klonopin. Consider alternatives for management of RLS.  Check with PCP before using over the counter medications  Avoid over the counter medications that can affect cognition (e.g., Benadryl, Tylenol PM)   Avoid NSAIDs due to risk of GI bleed and renal impairment    Other Findings   Overall health  BMI: 26.00 kg/m2  Maintain well-balanced diet  Continue following with primary care physician regularly  Restless leg syndrome  Has been on low dose but regular use of klonopin. Although a low dose this may contribute to his cognition.    Discussed to discuss this further with his PCP. Recommend to consider other agents such as low dose of requip or gabapentin for treatment of RLS.   Hypercholesterolemia  On statin. Continue management per PCP    Depression with anxiety  Discussed concern for ongoing depression with anxiety. Suspect this is the underlying issue that is affecting his cognition.   Advised to reach out to his PCP to discuss this further . GDS is 8/15 and admits to a lot of anxiety that has worsened over the past year or few years. Consider initiation of medication , such as SSRI or therapy.     Recommended Health Maintenance   Immunizations, if not contraindicated:   Influenza vaccine yearly  Pneumo vaccine every 5 years (65 years and over)  Shingles vaccine  COVID-19 vaccine    Social / Safety Considerations  Consider a [facility] for positive socialization, physical exercise, cognitive stimulation  Stay  in touch with family and friends  Plan self-care activities for your mental well-being each week  Consider volunteering through [employer] ([phone]) or [employer]  Recommend review of fall risk prevention tips  Recommend use of fall precautions including fall alert device  Consider assistance for medication administration such as blister packaging or use of an automated pill dispenser    Long Term Care Issues  Maintain updated advance directives and provide a copy to your primary care provider  Educational information provided      Patient and family verbalized understanding of above care plan.    For care coordination purposes, this care plan will be shared with your primary care provider. With any questions, please contact our office at [phone].       History of Present Illness     HPI  Jaguar Wilde is a 73 y.o. male who is in the office for Geriatric Care Plan. History obtained from patient and family members. Denies any new complaints, falls, hospitalization.     Past Medical, Social, Surgical, Medications and Allergy history reviewed.     Review of Systems     Review of Systems   Constitutional: Negative.  Negative for activity change, appetite change, chills and diaphoresis.   HENT:  Negative for congestion and dental problem.    Respiratory: Negative.  Negative for apnea, chest tightness, shortness of breath and wheezing.    Cardiovascular: Negative.  Negative for chest pain, palpitations and leg swelling.   Gastrointestinal: Negative.  Negative for abdominal distention, abdominal pain, constipation, diarrhea and nausea.   Genitourinary: Negative.  Negative for difficulty urinating, dysuria and frequency.       History     Past Medical History:   Diagnosis Date   • Benign neoplasm of colon    • Chronic rhinitis    • Colon polyp    • Diverticular disease of colon    • Dysphagia    • Environmental allergies    • GERD (gastroesophageal reflux disease)    • Hyperlipidemia    • Internal  hemorrhoids without complication    • PSA (psoriatic arthritis) (HCC)     elevated   • Restless leg syndrome    • Thyroid disorder    • Thyroid nodule      Past Surgical History:   Procedure Laterality Date   • CATARACT EXTRACTION  2022   • COLONOSCOPY  09/22/2010   • COLONOSCOPY  06/13/2007   • COLONOSCOPY  05/27/2004   • COLONOSCOPY  12/09/2015   • GALLBLADDER SURGERY     • OTHER SURGICAL HISTORY  11/2012    abcessed tooth   • THYROIDECTOMY     • TONSILLECTOMY     • UPPER GASTROINTESTINAL ENDOSCOPY  10/30/2013     Social History     Socioeconomic History   • Marital status: /Civil Union     Spouse name: Not on file   • Number of children: Not on file   • Years of education: Not on file   • Highest education level: Not on file   Occupational History   • Not on file   Tobacco Use   • Smoking status: Never   • Smokeless tobacco: Never   Vaping Use   • Vaping status: Never Used   Substance and Sexual Activity   • Alcohol use: No   • Drug use: No   • Sexual activity: Not on file   Other Topics Concern   • Not on file   Social History Narrative    Consumes on average 3 cups of regular coffee per day.     Social Determinants of Health     Financial Resource Strain: Low Risk  (12/8/2023)    Overall Financial Resource Strain (CARDIA)    • Difficulty of Paying Living Expenses: Not hard at all   Food Insecurity: Not on file   Transportation Needs: No Transportation Needs (12/8/2023)    PRAPARE - Transportation    • Lack of Transportation (Medical): No    • Lack of Transportation (Non-Medical): No   Physical Activity: Not on file   Stress: Not on file   Social Connections: Not on file   Intimate Partner Violence: Not on file   Housing Stability: Not on file     Current Outpatient Medications   Medication Sig Dispense Refill   • aspirin 81 MG tablet Take 81 mg by mouth     • CALCIUM/MAGNESIUM/ZINC FORMULA PO Take 1 tablet by mouth daily     • clobetasol (TEMOVATE) 0.05 % ointment Apply topically as needed     •  clonazePAM (KlonoPIN) 0.25 MG disintegrating tablet Take 1 tablet (0.25 mg total) by mouth 2 (two) times a day 60 tablet 0   • docusate sodium (QC Stool Softener) 100 mg capsule Take 1 capsule (100 mg total) by mouth daily 90 capsule 2   • finasteride (PROSCAR) 5 mg tablet Take 1 tablet (5 mg total) by mouth daily 90 tablet 2   • GARLIC PO Take by mouth daily     • levothyroxine 75 mcg tablet Take 1 tablet (75 mcg total) by mouth daily 90 tablet 2   • Multiple Vitamins-Minerals (LUTEIN-ZEAXANTHIN PO) Take 20 mg by mouth     • Multiple Vitamins-Minerals (MULTIVITAMIN PO) Take by mouth daily     • Omega-3 Fatty Acids (FISH OIL PO) Take by mouth daily     • omeprazole (PriLOSEC) 20 mg delayed release capsule Take 20 mg by mouth     • simvastatin (ZOCOR) 40 mg tablet Take 1 tablet (40 mg total) by mouth daily at bedtime 90 tablet 3   • tamsulosin (FLOMAX) 0.4 mg Take 2 capsules (0.8 mg total) by mouth daily with dinner 90 capsule 0     No current facility-administered medications for this visit.     Family History   Problem Relation Age of Onset   • Colon cancer Father    • Other Father         metastatic: lymph tissue, breast, thyroid, prostate, liver, lung   • Other Family         malignant neoplasm of gastrointestinal disease   • Heart disease Family    • Diabetes Family        Allergies:  Allergies   Allergen Reactions   • Codeine          Objective      Vitals:  Vitals:    03/07/24 0853   BP: 108/68   Pulse: 71   Temp: (!) 97 °F (36.1 °C)   SpO2: 97%       Physical Exam  Vitals reviewed.   Constitutional:       General: He is not in acute distress.     Appearance: Normal appearance. He is not ill-appearing.   HENT:      Head: Normocephalic.   Pulmonary:      Effort: Pulmonary effort is normal.   Neurological:      General: No focal deficit present.      Mental Status: He is alert and oriented to person, place, and time.   Psychiatric:         Mood and Affect: Mood normal.         Behavior: Behavior normal.          Thought Content: Thought content normal.         Judgment: Judgment normal.      I have spent a total time of 46 minutes on 03/07/24 in caring for this patient including Diagnostic results, Prognosis, Risks and benefits of tx options, Instructions for management, Patient and family education, Risk factor reductions, Impressions, Counseling / Coordination of care, Documenting in the medical record, and Reviewing / ordering tests, medicine, procedures  .

## 2024-03-07 ENCOUNTER — OFFICE VISIT (OUTPATIENT)
Age: 74
End: 2024-03-07
Payer: MEDICARE

## 2024-03-07 VITALS
WEIGHT: 146.2 LBS | SYSTOLIC BLOOD PRESSURE: 108 MMHG | HEART RATE: 71 BPM | HEIGHT: 64 IN | DIASTOLIC BLOOD PRESSURE: 68 MMHG | TEMPERATURE: 97 F | OXYGEN SATURATION: 97 % | BODY MASS INDEX: 24.96 KG/M2

## 2024-03-07 DIAGNOSIS — E03.8 OTHER SPECIFIED HYPOTHYROIDISM: ICD-10-CM

## 2024-03-07 DIAGNOSIS — K21.00 GASTROESOPHAGEAL REFLUX DISEASE WITH ESOPHAGITIS, UNSPECIFIED WHETHER HEMORRHAGE: ICD-10-CM

## 2024-03-07 DIAGNOSIS — E78.00 HYPERCHOLESTEROLEMIA: ICD-10-CM

## 2024-03-07 DIAGNOSIS — R41.3 MEMORY LOSS: Primary | ICD-10-CM

## 2024-03-07 DIAGNOSIS — G25.81 RESTLESS LEGS SYNDROME: ICD-10-CM

## 2024-03-07 DIAGNOSIS — F41.8 DEPRESSION WITH ANXIETY: ICD-10-CM

## 2024-03-07 PROCEDURE — G2211 COMPLEX E/M VISIT ADD ON: HCPCS | Performed by: FAMILY MEDICINE

## 2024-03-07 PROCEDURE — 99215 OFFICE O/P EST HI 40 MIN: CPT | Performed by: FAMILY MEDICINE

## 2024-03-07 NOTE — PROGRESS NOTES
[facility]  [address], [address]  [place], [place] [id number] [phone]    Care Conference: Resources Provided    LSW provided the following resources, along with a copy of care plan:  [place] GARCIA [id number]    General Information  - 10 Ways to Love Your Brain  - NIH: Reducing your risk of Dementia    Caregiver Support  - Flyer for [facility] Virtual Caregiver Support Group (meeting 2x per month by Zoom)  - Alzheimer's Association Rx Pad (guide to website and 24/7 helpline, 1-121.446.3045)    Safety  - CDC fall prevention / home safety checklist    Community Resources  - [facility] Resource Guide (contains information about higher levels of care, homecare, etc.)

## 2024-03-18 DIAGNOSIS — G25.81 RESTLESS LEGS SYNDROME: ICD-10-CM

## 2024-03-18 NOTE — TELEPHONE ENCOUNTER
Patient requesting refill(s) of: klonopin 0.25 mg BID    Last filled: 2/15/2024 #60 x 0  Last appt: 12/13/2023  Next appt: 6/13/2024  Pharmacy: [facility] [place]

## 2024-03-19 RX ORDER — CLONAZEPAM 0.25 MG/1
0.25 TABLET, ORALLY DISINTEGRATING ORAL 2 TIMES DAILY
Qty: 60 TABLET | Refills: 0 | Status: SHIPPED | OUTPATIENT
Start: 2024-03-19

## 2024-04-08 DIAGNOSIS — N40.0 BENIGN PROSTATIC HYPERPLASIA WITHOUT LOWER URINARY TRACT SYMPTOMS: ICD-10-CM

## 2024-04-08 RX ORDER — TAMSULOSIN HYDROCHLORIDE 0.4 MG/1
0.8 CAPSULE ORAL
Qty: 90 CAPSULE | Refills: 0 | Status: SHIPPED | OUTPATIENT
Start: 2024-04-08

## 2024-04-08 NOTE — TELEPHONE ENCOUNTER
Patient requesting refill(s) of: Flomax    Last filled: 2/27/24  Last appt: 12/13/23  Next appt: 6/18/24  Pharmacy: [facility] [place]

## 2024-04-16 DIAGNOSIS — G25.81 RESTLESS LEGS SYNDROME: ICD-10-CM

## 2024-04-16 RX ORDER — CLONAZEPAM 0.25 MG/1
0.25 TABLET, ORALLY DISINTEGRATING ORAL 2 TIMES DAILY
Qty: 60 TABLET | Refills: 0 | Status: SHIPPED | OUTPATIENT
Start: 2024-04-16

## 2024-04-16 NOTE — TELEPHONE ENCOUNTER
Patient requesting refill(s) of: YonathaninoAbelardo    Last filled: 3/19/24  Last appt: 12/13/23  Next appt: 6/18/24  Pharmacy: [facility] [place]

## 2024-05-30 DIAGNOSIS — N40.0 BENIGN PROSTATIC HYPERPLASIA WITHOUT LOWER URINARY TRACT SYMPTOMS: ICD-10-CM

## 2024-05-30 DIAGNOSIS — G25.81 RESTLESS LEGS SYNDROME: ICD-10-CM

## 2024-05-31 RX ORDER — TAMSULOSIN HYDROCHLORIDE 0.4 MG/1
0.8 CAPSULE ORAL
Qty: 90 CAPSULE | Refills: 0 | Status: SHIPPED | OUTPATIENT
Start: 2024-05-31

## 2024-05-31 RX ORDER — CLONAZEPAM 0.25 MG/1
0.25 TABLET, ORALLY DISINTEGRATING ORAL 2 TIMES DAILY
Qty: 60 TABLET | Refills: 0 | Status: SHIPPED | OUTPATIENT
Start: 2024-05-31

## 2024-06-03 ENCOUNTER — APPOINTMENT (OUTPATIENT)
Dept: LAB | Facility: CLINIC | Age: 74
End: 2024-06-03
Payer: MEDICARE

## 2024-06-03 DIAGNOSIS — R97.20 ELEVATED PROSTATE SPECIFIC ANTIGEN (PSA): ICD-10-CM

## 2024-06-03 PROCEDURE — 36415 COLL VENOUS BLD VENIPUNCTURE: CPT

## 2024-06-03 PROCEDURE — 84153 ASSAY OF PSA TOTAL: CPT

## 2024-06-03 PROCEDURE — 84154 ASSAY OF PSA FREE: CPT

## 2024-06-04 ENCOUNTER — APPOINTMENT (OUTPATIENT)
Dept: RADIOLOGY | Facility: CLINIC | Age: 74
End: 2024-06-04
Payer: MEDICARE

## 2024-06-04 ENCOUNTER — OFFICE VISIT (OUTPATIENT)
Dept: FAMILY MEDICINE CLINIC | Facility: CLINIC | Age: 74
End: 2024-06-04
Payer: MEDICARE

## 2024-06-04 VITALS
OXYGEN SATURATION: 97 % | WEIGHT: 146 LBS | HEART RATE: 90 BPM | DIASTOLIC BLOOD PRESSURE: 64 MMHG | HEIGHT: 64 IN | RESPIRATION RATE: 16 BRPM | SYSTOLIC BLOOD PRESSURE: 106 MMHG | TEMPERATURE: 97.8 F | BODY MASS INDEX: 24.92 KG/M2

## 2024-06-04 DIAGNOSIS — R05.1 ACUTE COUGH: ICD-10-CM

## 2024-06-04 DIAGNOSIS — J06.9 UPPER RESPIRATORY TRACT INFECTION, UNSPECIFIED TYPE: ICD-10-CM

## 2024-06-04 DIAGNOSIS — J40 BRONCHITIS: Primary | ICD-10-CM

## 2024-06-04 DIAGNOSIS — J40 BRONCHITIS: ICD-10-CM

## 2024-06-04 LAB
PSA FREE MFR SERPL: 22.7 %
PSA FREE SERPL-MCNC: 1.5 NG/ML
PSA SERPL-MCNC: 6.6 NG/ML (ref 0–4)

## 2024-06-04 PROCEDURE — 71046 X-RAY EXAM CHEST 2 VIEWS: CPT

## 2024-06-04 PROCEDURE — 99213 OFFICE O/P EST LOW 20 MIN: CPT | Performed by: INTERNAL MEDICINE

## 2024-06-04 PROCEDURE — G2211 COMPLEX E/M VISIT ADD ON: HCPCS | Performed by: INTERNAL MEDICINE

## 2024-06-04 RX ORDER — AZITHROMYCIN 250 MG/1
TABLET, FILM COATED ORAL
Qty: 6 TABLET | Refills: 0 | Status: SHIPPED | OUTPATIENT
Start: 2024-06-04 | End: 2024-06-08

## 2024-06-04 RX ORDER — DEXTROMETHORPHAN HYDROBROMIDE AND PROMETHAZINE HYDROCHLORIDE 15; 6.25 MG/5ML; MG/5ML
5 SYRUP ORAL 4 TIMES DAILY PRN
Qty: 240 ML | Refills: 0 | Status: SHIPPED | OUTPATIENT
Start: 2024-06-04

## 2024-06-04 NOTE — PROGRESS NOTES
Ambulatory Visit  Name: Jaguar Wilde      : 1950      MRN: [id number]  Encounter Provider: Jennifer Farley MD  Encounter Date: 2024   Encounter department: [facility] PRIMARY CARE    Assessment & Plan   1. Bronchitis  -     XR chest pa & lateral; Future; Expected date: 2024  -     azithromycin (ZITHROMAX) 250 mg tablet; Take 2 tablets today then 1 tablet daily x 4 days  -     promethazine-dextromethorphan (PHENERGAN-DM) 6.25-15 mg/5 mL oral syrup; Take 5 mL by mouth 4 (four) times a day as needed for cough  2. Upper respiratory tract infection, unspecified type  -     XR chest pa & lateral; Future; Expected date: 2024  3. Acute cough  -     XR chest pa & lateral; Future; Expected date: 2024  -     promethazine-dextromethorphan (PHENERGAN-DM) 6.25-15 mg/5 mL oral syrup; Take 5 mL by mouth 4 (four) times a day as needed for cough     History of Present Illness     75 yo male here for acute visit. Pt reports symptoms began around May 15th after visiting his son in [place]. Initially thought he had allergies- sinus pressure, rhinorrhea, cough but symptoms progressed and cough hasn't resolved. Initially productive, now dry but also feels like he cannot take deep breath. No fever/chills, appetite is good but does endorse fatigue. No sick contacts.         Review of Systems   Constitutional:  Negative for appetite change, chills, fatigue and fever.   HENT:  Positive for congestion, postnasal drip, rhinorrhea and sinus pressure.    Respiratory:  Positive for cough and chest tightness. Negative for shortness of breath.    Gastrointestinal:  Negative for abdominal pain, diarrhea, nausea and vomiting.   Skin:  Negative for rash.     Current Outpatient Medications on File Prior to Visit   Medication Sig Dispense Refill   • aspirin 81 MG tablet Take 81 mg by mouth     • CALCIUM/MAGNESIUM/ZINC FORMULA PO Take 1 tablet by mouth daily     • clobetasol (TEMOVATE) 0.05 % ointment  "Apply topically as needed     • clonazePAM (KlonoPIN) 0.25 MG disintegrating tablet Take 1 tablet (0.25 mg total) by mouth 2 (two) times a day 60 tablet 0   • docusate sodium (QC Stool Softener) 100 mg capsule Take 1 capsule (100 mg total) by mouth daily 90 capsule 2   • finasteride (PROSCAR) 5 mg tablet Take 1 tablet (5 mg total) by mouth daily 90 tablet 2   • GARLIC PO Take by mouth daily     • levothyroxine 75 mcg tablet Take 1 tablet (75 mcg total) by mouth daily 90 tablet 2   • Multiple Vitamins-Minerals (LUTEIN-ZEAXANTHIN PO) Take 20 mg by mouth     • Multiple Vitamins-Minerals (MULTIVITAMIN PO) Take by mouth daily     • Omega-3 Fatty Acids (FISH OIL PO) Take by mouth daily     • omeprazole (PriLOSEC) 20 mg delayed release capsule Take 20 mg by mouth     • simvastatin (ZOCOR) 40 mg tablet Take 1 tablet (40 mg total) by mouth daily at bedtime 90 tablet 3   • tamsulosin (FLOMAX) 0.4 mg Take 2 capsules (0.8 mg total) by mouth daily with dinner 90 capsule 0     No current facility-administered medications on file prior to visit.      Objective     /64   Pulse 90   Temp 97.8 °F (36.6 °C) (Temporal)   Resp 16   Ht 5' 3.6\" (1.615 m)   Wt 66.2 kg (146 lb)   SpO2 97%   BMI 25.38 kg/m²     Physical Exam  Vitals reviewed.   Constitutional:       General: He is not in acute distress.     Appearance: He is normal weight.   HENT:      Head: Normocephalic and atraumatic.      Right Ear: Tympanic membrane, ear canal and external ear normal.      Left Ear: Tympanic membrane, ear canal and external ear normal.      Mouth/Throat:      Pharynx: No oropharyngeal exudate or posterior oropharyngeal erythema.   Cardiovascular:      Rate and Rhythm: Regular rhythm. Tachycardia present.      Heart sounds: No murmur heard.     No friction rub. No gallop.   Pulmonary:      Effort: Pulmonary effort is normal. No respiratory distress.      Breath sounds: Examination of the right-lower field reveals rales. Rales present. No " wheezing or rhonchi.   Lymphadenopathy:      Cervical: No cervical adenopathy.   Neurological:      General: No focal deficit present.      Mental Status: He is alert.   Psychiatric:         Mood and Affect: Mood and affect normal.         Behavior: Behavior normal. Behavior is cooperative.       Administrative Statements

## 2024-06-04 NOTE — PATIENT INSTRUCTIONS
Please get chest xray done to check for pneumonia  Z-pack and cough syrup sent to your pharmacy  Call or message later this week with symptom update

## 2024-06-12 ENCOUNTER — APPOINTMENT (OUTPATIENT)
Dept: LAB | Facility: CLINIC | Age: 74
End: 2024-06-12
Payer: MEDICARE

## 2024-06-12 DIAGNOSIS — E78.5 HYPERLIPIDEMIA, UNSPECIFIED HYPERLIPIDEMIA TYPE: ICD-10-CM

## 2024-06-12 LAB
ALBUMIN SERPL BCP-MCNC: 4.1 G/DL (ref 3.5–5)
ALP SERPL-CCNC: 49 U/L (ref 34–104)
ALT SERPL W P-5'-P-CCNC: 17 U/L (ref 7–52)
ANION GAP SERPL CALCULATED.3IONS-SCNC: 9 MMOL/L (ref 4–13)
AST SERPL W P-5'-P-CCNC: 22 U/L (ref 13–39)
BASOPHILS # BLD AUTO: 0.05 THOUSANDS/ÂΜL (ref 0–0.1)
BASOPHILS NFR BLD AUTO: 1 % (ref 0–1)
BILIRUB SERPL-MCNC: 0.62 MG/DL (ref 0.2–1)
BUN SERPL-MCNC: 14 MG/DL (ref 5–25)
CALCIUM SERPL-MCNC: 9.2 MG/DL (ref 8.4–10.2)
CHLORIDE SERPL-SCNC: 103 MMOL/L (ref 96–108)
CHOLEST SERPL-MCNC: 134 MG/DL
CO2 SERPL-SCNC: 27 MMOL/L (ref 21–32)
CREAT SERPL-MCNC: 0.91 MG/DL (ref 0.6–1.3)
EOSINOPHIL # BLD AUTO: 0.16 THOUSAND/ÂΜL (ref 0–0.61)
EOSINOPHIL NFR BLD AUTO: 3 % (ref 0–6)
ERYTHROCYTE [DISTWIDTH] IN BLOOD BY AUTOMATED COUNT: 12.2 % (ref 11.6–15.1)
EST. AVERAGE GLUCOSE BLD GHB EST-MCNC: 114 MG/DL
GFR SERPL CREATININE-BSD FRML MDRD: 83 ML/MIN/1.73SQ M
GLUCOSE P FAST SERPL-MCNC: 96 MG/DL (ref 65–99)
HBA1C MFR BLD: 5.6 %
HCT VFR BLD AUTO: 45.5 % (ref 36.5–49.3)
HDLC SERPL-MCNC: 43 MG/DL
HGB BLD-MCNC: 15.1 G/DL (ref 12–17)
IMM GRANULOCYTES # BLD AUTO: 0.04 THOUSAND/UL (ref 0–0.2)
IMM GRANULOCYTES NFR BLD AUTO: 1 % (ref 0–2)
LDLC SERPL CALC-MCNC: 74 MG/DL (ref 0–100)
LYMPHOCYTES # BLD AUTO: 1.66 THOUSANDS/ÂΜL (ref 0.6–4.47)
LYMPHOCYTES NFR BLD AUTO: 27 % (ref 14–44)
MCH RBC QN AUTO: 31.5 PG (ref 26.8–34.3)
MCHC RBC AUTO-ENTMCNC: 33.2 G/DL (ref 31.4–37.4)
MCV RBC AUTO: 95 FL (ref 82–98)
MONOCYTES # BLD AUTO: 0.57 THOUSAND/ÂΜL (ref 0.17–1.22)
MONOCYTES NFR BLD AUTO: 9 % (ref 4–12)
NEUTROPHILS # BLD AUTO: 3.66 THOUSANDS/ÂΜL (ref 1.85–7.62)
NEUTS SEG NFR BLD AUTO: 59 % (ref 43–75)
NONHDLC SERPL-MCNC: 91 MG/DL
NRBC BLD AUTO-RTO: 0 /100 WBCS
PLATELET # BLD AUTO: 178 THOUSANDS/UL (ref 149–390)
PMV BLD AUTO: 10.1 FL (ref 8.9–12.7)
POTASSIUM SERPL-SCNC: 4.2 MMOL/L (ref 3.5–5.3)
PROT SERPL-MCNC: 6.6 G/DL (ref 6.4–8.4)
RBC # BLD AUTO: 4.8 MILLION/UL (ref 3.88–5.62)
SODIUM SERPL-SCNC: 139 MMOL/L (ref 135–147)
TRIGL SERPL-MCNC: 86 MG/DL
TSH SERPL DL<=0.05 MIU/L-ACNC: 1.86 UIU/ML (ref 0.45–4.5)
WBC # BLD AUTO: 6.14 THOUSAND/UL (ref 4.31–10.16)

## 2024-06-12 RX ORDER — SIMVASTATIN 40 MG
40 TABLET ORAL
Qty: 90 TABLET | Refills: 1 | Status: SHIPPED | OUTPATIENT
Start: 2024-06-12

## 2024-06-18 ENCOUNTER — OFFICE VISIT (OUTPATIENT)
Dept: FAMILY MEDICINE CLINIC | Facility: CLINIC | Age: 74
End: 2024-06-18
Payer: MEDICARE

## 2024-06-18 VITALS
OXYGEN SATURATION: 97 % | HEIGHT: 63 IN | DIASTOLIC BLOOD PRESSURE: 68 MMHG | BODY MASS INDEX: 25.98 KG/M2 | WEIGHT: 146.6 LBS | HEART RATE: 77 BPM | TEMPERATURE: 98.3 F | SYSTOLIC BLOOD PRESSURE: 112 MMHG

## 2024-06-18 DIAGNOSIS — F41.9 ANXIETY AND DEPRESSION: Primary | ICD-10-CM

## 2024-06-18 DIAGNOSIS — G25.81 RESTLESS LEGS SYNDROME: ICD-10-CM

## 2024-06-18 DIAGNOSIS — E03.9 HYPOTHYROIDISM, UNSPECIFIED TYPE: ICD-10-CM

## 2024-06-18 DIAGNOSIS — E78.00 HYPERCHOLESTEROLEMIA: ICD-10-CM

## 2024-06-18 DIAGNOSIS — N40.0 BENIGN PROSTATIC HYPERPLASIA WITHOUT LOWER URINARY TRACT SYMPTOMS: ICD-10-CM

## 2024-06-18 DIAGNOSIS — F32.A ANXIETY AND DEPRESSION: Primary | ICD-10-CM

## 2024-06-18 DIAGNOSIS — K21.00 GASTROESOPHAGEAL REFLUX DISEASE WITH ESOPHAGITIS, UNSPECIFIED WHETHER HEMORRHAGE: ICD-10-CM

## 2024-06-18 DIAGNOSIS — E03.8 OTHER SPECIFIED HYPOTHYROIDISM: ICD-10-CM

## 2024-06-18 PROCEDURE — G2211 COMPLEX E/M VISIT ADD ON: HCPCS | Performed by: NURSE PRACTITIONER

## 2024-06-18 PROCEDURE — 99213 OFFICE O/P EST LOW 20 MIN: CPT | Performed by: NURSE PRACTITIONER

## 2024-06-18 RX ORDER — SERTRALINE HYDROCHLORIDE 25 MG/1
25 TABLET, FILM COATED ORAL DAILY
Qty: 30 TABLET | Refills: 5 | Status: SHIPPED | OUTPATIENT
Start: 2024-06-18 | End: 2024-12-15

## 2024-06-18 NOTE — PROGRESS NOTES
Ambulatory Visit  Name: Jaguar Wilde      : 1950      MRN: [id number]  Encounter Provider: MARGARITA Thomas  Encounter Date: 2024   Encounter department: [facility] PRIMARY CARE    Assessment & Plan   1. Anxiety and depression  -     sertraline (ZOLOFT) 25 mg tablet; Take 1 tablet (25 mg total) by mouth daily  2. Restless legs syndrome  3. Hypercholesterolemia  -     Lipid panel; Future  4. Gastroesophageal reflux disease with esophagitis, unspecified whether hemorrhage  -     Lipid panel; Future  -     Comprehensive metabolic panel; Future  -     CBC and differential; Future  5. Other specified hypothyroidism  6. Benign prostatic hyperplasia without lower urinary tract symptoms  7. Hypothyroidism, unspecified type  -     TSH, 3rd generation with Free T4 reflex; Future      Depression Screening and Follow-up Plan: Patient was screened for depression during today's encounter. They screened negative with a PHQ-2 score of 2.        History of Present Illness     Patient here for follow up visit.     Anxiety-  took medication many years ago, possibly Zoloft. Gets numbness of the face and hand when getting anxious. Sleep trouble. Feeling drepressed. Will nap when     Went to [facility] for memory testing. Was told that klonopin could be causing memory loss as well as anxiety and depression.  Discussed trying gabapentin or remron for restless leg after starting zoloft.           Review of Systems   Constitutional: Negative.  Negative for chills, fatigue and fever.   Respiratory: Negative.  Negative for shortness of breath and wheezing.    Cardiovascular: Negative.  Negative for chest pain and palpitations.   Musculoskeletal: Negative.    Skin: Negative.  Negative for rash.   Neurological: Negative.  Negative for dizziness, light-headedness and headaches.   Psychiatric/Behavioral: Negative.  Negative for decreased concentration and dysphoric mood. The patient is not nervous/anxious.       Past Medical History:   Diagnosis Date    Benign neoplasm of colon     Chronic rhinitis     Colon polyp     Diverticular disease of colon     Dysphagia     Environmental allergies     GERD (gastroesophageal reflux disease)     Hyperlipidemia     Internal hemorrhoids without complication     PSA (psoriatic arthritis) (HCC)     elevated    Restless leg syndrome     Thyroid disorder     Thyroid nodule      Past Surgical History:   Procedure Laterality Date    CATARACT EXTRACTION  2022    COLONOSCOPY  09/22/2010    COLONOSCOPY  06/13/2007    COLONOSCOPY  05/27/2004    COLONOSCOPY  12/09/2015    GALLBLADDER SURGERY      OTHER SURGICAL HISTORY  11/2012    abcessed tooth    THYROIDECTOMY      TONSILLECTOMY      UPPER GASTROINTESTINAL ENDOSCOPY  10/30/2013     Family History   Problem Relation Age of Onset    Colon cancer Father     Other Father         metastatic: lymph tissue, breast, thyroid, prostate, liver, lung    Other Family         malignant neoplasm of gastrointestinal disease    Heart disease Family     Diabetes Family      Social History     Tobacco Use    Smoking status: Never    Smokeless tobacco: Never   Vaping Use    Vaping status: Never Used   Substance and Sexual Activity    Alcohol use: No    Drug use: No    Sexual activity: Not on file     Current Outpatient Medications on File Prior to Visit   Medication Sig    aspirin 81 MG tablet Take 81 mg by mouth    CALCIUM/MAGNESIUM/ZINC FORMULA PO Take 1 tablet by mouth daily    clobetasol (TEMOVATE) 0.05 % ointment Apply topically as needed    clonazePAM (KlonoPIN) 0.25 MG disintegrating tablet Take 1 tablet (0.25 mg total) by mouth 2 (two) times a day    docusate sodium (QC Stool Softener) 100 mg capsule Take 1 capsule (100 mg total) by mouth daily    finasteride (PROSCAR) 5 mg tablet Take 1 tablet (5 mg total) by mouth daily    GARLIC PO Take by mouth daily    levothyroxine 75 mcg tablet Take 1 tablet (75 mcg total) by mouth daily    Multiple  "Vitamins-Minerals (LUTEIN-ZEAXANTHIN PO) Take 20 mg by mouth    Multiple Vitamins-Minerals (MULTIVITAMIN PO) Take by mouth daily    Omega-3 Fatty Acids (FISH OIL PO) Take by mouth daily    omeprazole (PriLOSEC) 20 mg delayed release capsule Take 20 mg by mouth    simvastatin (ZOCOR) 40 mg tablet Take 1 tablet (40 mg total) by mouth daily at bedtime    tamsulosin (FLOMAX) 0.4 mg Take 2 capsules (0.8 mg total) by mouth daily with dinner    promethazine-dextromethorphan (PHENERGAN-DM) 6.25-15 mg/5 mL oral syrup Take 5 mL by mouth 4 (four) times a day as needed for cough     Allergies   Allergen Reactions    Codeine      Immunization History   Administered Date(s) Administered    COVID-19 PFIZER VACCINE 0.3 ML IM 04/01/2021, 04/22/2021, 11/29/2021, 06/28/2022    COVID-19 Pfizer Vac BIVALENT Nir-sucrose 12 Yr+ IM 03/28/2023    INFLUENZA 12/05/2017, 10/05/2022    Influenza, high dose seasonal 0.7 mL 09/24/2018, 09/25/2019, 09/24/2020, 10/27/2021, 10/05/2022, 10/18/2023    Pneumococcal Conjugate 13-Valent 12/19/2018    Pneumococcal Polysaccharide PPV23 05/13/2020    Tdap 12/19/2018    Zoster 09/06/2016    Zoster Vaccine Recombinant 08/24/2022     Objective     /68   Pulse 77   Temp 98.3 °F (36.8 °C)   Ht 5' 3\" (1.6 m)   Wt 66.5 kg (146 lb 9.6 oz)   SpO2 97%   BMI 25.97 kg/m²     Physical Exam  Vitals and nursing note reviewed.   Constitutional:       General: He is not in acute distress.     Appearance: He is well-developed. He is not diaphoretic.   Cardiovascular:      Rate and Rhythm: Normal rate and regular rhythm.      Heart sounds: Normal heart sounds. No murmur heard.  Pulmonary:      Effort: Pulmonary effort is normal. No respiratory distress.      Breath sounds: Normal breath sounds. No wheezing.   Abdominal:      General: Bowel sounds are normal.      Palpations: Abdomen is soft.   Musculoskeletal:         General: Normal range of motion.   Skin:     General: Skin is warm and dry.      Capillary " Refill: Capillary refill takes less than 2 seconds.      Findings: No erythema or rash.   Neurological:      General: No focal deficit present.      Mental Status: He is alert and oriented to person, place, and time.   Psychiatric:         Behavior: Behavior normal. Behavior is cooperative.         Thought Content: Thought content normal.       Administrative Statements

## 2024-07-04 DIAGNOSIS — G25.81 RESTLESS LEGS SYNDROME: ICD-10-CM

## 2024-07-09 RX ORDER — CLONAZEPAM 0.25 MG/1
0.25 TABLET, ORALLY DISINTEGRATING ORAL 2 TIMES DAILY
Qty: 60 TABLET | Refills: 0 | Status: SHIPPED | OUTPATIENT
Start: 2024-07-09

## 2024-07-17 DIAGNOSIS — F32.A ANXIETY AND DEPRESSION: ICD-10-CM

## 2024-07-17 DIAGNOSIS — F41.9 ANXIETY AND DEPRESSION: ICD-10-CM

## 2024-07-17 RX ORDER — SERTRALINE HYDROCHLORIDE 25 MG/1
25 TABLET, FILM COATED ORAL DAILY
Qty: 90 TABLET | Refills: 0 | Status: SHIPPED | OUTPATIENT
Start: 2024-07-17 | End: 2024-07-24 | Stop reason: SDUPTHER

## 2024-07-18 DIAGNOSIS — N40.0 BENIGN PROSTATIC HYPERPLASIA WITHOUT LOWER URINARY TRACT SYMPTOMS: ICD-10-CM

## 2024-07-18 RX ORDER — TAMSULOSIN HYDROCHLORIDE 0.4 MG/1
0.8 CAPSULE ORAL
Qty: 90 CAPSULE | Refills: 1 | Status: SHIPPED | OUTPATIENT
Start: 2024-07-18

## 2024-07-24 ENCOUNTER — OFFICE VISIT (OUTPATIENT)
Dept: FAMILY MEDICINE CLINIC | Facility: CLINIC | Age: 74
End: 2024-07-24
Payer: MEDICARE

## 2024-07-24 VITALS
RESPIRATION RATE: 18 BRPM | SYSTOLIC BLOOD PRESSURE: 120 MMHG | TEMPERATURE: 97.7 F | OXYGEN SATURATION: 99 % | HEART RATE: 78 BPM | DIASTOLIC BLOOD PRESSURE: 70 MMHG | HEIGHT: 63 IN | BODY MASS INDEX: 25.52 KG/M2 | WEIGHT: 144 LBS

## 2024-07-24 DIAGNOSIS — F32.A ANXIETY AND DEPRESSION: ICD-10-CM

## 2024-07-24 DIAGNOSIS — F41.9 ANXIETY AND DEPRESSION: ICD-10-CM

## 2024-07-24 PROCEDURE — 99214 OFFICE O/P EST MOD 30 MIN: CPT | Performed by: NURSE PRACTITIONER

## 2024-07-24 PROCEDURE — G2211 COMPLEX E/M VISIT ADD ON: HCPCS | Performed by: NURSE PRACTITIONER

## 2024-07-24 NOTE — PROGRESS NOTES
Ambulatory Visit  Name: Jaguar Wilde      : 1950      MRN: [id number]  Encounter Provider: MARGARITA Thomas  Encounter Date: 2024   Encounter department: [facility] PRIMARY [facility]ARE    Assessment & Plan   1. Anxiety and depression  -     sertraline (ZOLOFT) 50 mg tablet; Take 1 tablet (50 mg total) by mouth daily      Depression Screening and Follow-up Plan: Patient's depression screening was positive with a PHQ-2 score of 4. Their PHQ-9 score was 17. Patient assessed for underlying major depression. Brief counseling provided and recommend additional follow-up/re-evaluation next office visit.         History of Present Illness      Patient here for follow up visit for anxiety.  Not much  improvement. Able to deescalate situations easier but can still get physical symptoms of dizziness, lightheadedness, weakness, shakiness.       Review of Systems   Constitutional: Negative.  Negative for fatigue and fever.   Respiratory: Negative.  Negative for shortness of breath and wheezing.    Cardiovascular: Negative.  Negative for chest pain and palpitations.   Skin: Negative.    Neurological: Negative.  Negative for dizziness, light-headedness and headaches.   Psychiatric/Behavioral:  Negative for decreased concentration and dysphoric mood. The patient is nervous/anxious. The patient is not hyperactive.      Past Medical History:   Diagnosis Date    Benign neoplasm of colon     Chronic rhinitis     Colon polyp     Diverticular disease of colon     Dysphagia     Environmental allergies     GERD (gastroesophageal reflux disease)     Hyperlipidemia     Internal hemorrhoids without complication     PSA (psoriatic arthritis) (HCC)     elevated    Restless leg syndrome     Thyroid disorder     Thyroid nodule      Past Surgical History:   Procedure Laterality Date    CATARACT EXTRACTION      COLONOSCOPY  2010    COLONOSCOPY  2007    COLONOSCOPY  2004    COLONOSCOPY  2015     GALLBLADDER SURGERY      OTHER SURGICAL HISTORY  11/2012    abcessed tooth    THYROIDECTOMY      TONSILLECTOMY      UPPER GASTROINTESTINAL ENDOSCOPY  10/30/2013     Family History   Problem Relation Age of Onset    Colon cancer Father     Other Father         metastatic: lymph tissue, breast, thyroid, prostate, liver, lung    Other Family         malignant neoplasm of gastrointestinal disease    Heart disease Family     Diabetes Family      Social History     Tobacco Use    Smoking status: Never    Smokeless tobacco: Never   Vaping Use    Vaping status: Never Used   Substance and Sexual Activity    Alcohol use: No    Drug use: No    Sexual activity: Not on file     Current Outpatient Medications on File Prior to Visit   Medication Sig    aspirin 81 MG tablet Take 81 mg by mouth    CALCIUM/MAGNESIUM/ZINC FORMULA PO Take 1 tablet by mouth daily    clobetasol (TEMOVATE) 0.05 % ointment Apply topically as needed    clonazePAM (KlonoPIN) 0.25 MG disintegrating tablet Take 1 tablet (0.25 mg total) by mouth 2 (two) times a day    docusate sodium (QC Stool Softener) 100 mg capsule Take 1 capsule (100 mg total) by mouth daily    finasteride (PROSCAR) 5 mg tablet Take 1 tablet (5 mg total) by mouth daily    GARLIC PO Take by mouth daily    levothyroxine 75 mcg tablet Take 1 tablet (75 mcg total) by mouth daily    Multiple Vitamins-Minerals (LUTEIN-ZEAXANTHIN PO) Take 20 mg by mouth    Multiple Vitamins-Minerals (MULTIVITAMIN PO) Take by mouth daily    Omega-3 Fatty Acids (FISH OIL PO) Take by mouth daily    omeprazole (PriLOSEC) 20 mg delayed release capsule Take 20 mg by mouth    simvastatin (ZOCOR) 40 mg tablet Take 1 tablet (40 mg total) by mouth daily at bedtime    tamsulosin (FLOMAX) 0.4 mg Take 2 capsules (0.8 mg total) by mouth daily with dinner    [DISCONTINUED] promethazine-dextromethorphan (PHENERGAN-DM) 6.25-15 mg/5 mL oral syrup Take 5 mL by mouth 4 (four) times a day as needed for cough    [DISCONTINUED]  "sertraline (ZOLOFT) 25 mg tablet take 1 tablet by mouth once daily     Allergies   Allergen Reactions    Codeine      Immunization History   Administered Date(s) Administered    COVID-19 PFIZER VACCINE 0.3 ML IM 04/01/2021, 04/22/2021, 11/29/2021, 06/28/2022    COVID-19 Pfizer Vac BIVALENT Nir-sucrose 12 Yr+ IM 03/28/2023    INFLUENZA 12/05/2017, 10/05/2022    Influenza, high dose seasonal 0.7 mL 09/24/2018, 09/25/2019, 09/24/2020, 10/27/2021, 10/05/2022, 10/18/2023    Pneumococcal Conjugate 13-Valent 12/19/2018    Pneumococcal Polysaccharide PPV23 05/13/2020    Tdap 12/19/2018    Zoster 09/06/2016    Zoster Vaccine Recombinant 08/24/2022     Objective     /70   Pulse 78   Temp 97.7 °F (36.5 °C)   Resp 18   Ht 5' 3\" (1.6 m)   Wt 65.3 kg (144 lb)   SpO2 99%   BMI 25.51 kg/m²     Physical Exam  Vitals and nursing note reviewed.   Constitutional:       General: He is not in acute distress.     Appearance: Normal appearance. He is well-developed. He is not toxic-appearing.   HENT:      Head: Normocephalic and atraumatic.   Pulmonary:      Effort: Pulmonary effort is normal. No respiratory distress.      Breath sounds: Normal breath sounds. No stridor.   Neurological:      Mental Status: He is alert and oriented to person, place, and time.   Psychiatric:         Mood and Affect: Mood and affect normal.         Behavior: Behavior normal. Behavior is cooperative.         Thought Content: Thought content normal.         Judgment: Judgment normal.         "

## 2024-07-24 NOTE — PATIENT INSTRUCTIONS
Call the office in 2 weeks if you want to increase dose up to the 100mg tablets.   Follow up in 4-6 weeks.   Phone  [phone]  gina leyva - therapist.

## 2024-07-29 ENCOUNTER — TELEPHONE (OUTPATIENT)
Age: 74
End: 2024-07-29

## 2024-07-29 NOTE — TELEPHONE ENCOUNTER
Patient calling [facility] requesting an appointment with a therapist, referred by his PCP MARGARITA Theodore.  OK to call landline and leave message.

## 2024-08-02 ENCOUNTER — TELEPHONE (OUTPATIENT)
Age: 74
End: 2024-08-02

## 2024-08-02 ENCOUNTER — TELEPHONE (OUTPATIENT)
Dept: FAMILY MEDICINE CLINIC | Facility: CLINIC | Age: 74
End: 2024-08-02

## 2024-08-02 DIAGNOSIS — F32.A ANXIETY AND DEPRESSION: Primary | ICD-10-CM

## 2024-08-02 DIAGNOSIS — F41.9 ANXIETY AND DEPRESSION: Primary | ICD-10-CM

## 2024-08-02 NOTE — TELEPHONE ENCOUNTER
"Behavioral Health Integration Screening Questionnaire     Are you aware of the referred from your [facility] Provider  : Yes     Please advise interviewee that they need to answer all questions truthfully to allow for best care, and any misrepresentations of information may affect their ability to be seen at this clinic   => Was this discussed? Yes     If Minor Child (under age 18)    Who is/are the legal guardian(s) of the child?     Is there a custody agreement? No     If \"YES\"- Custody orders must be obtained prior to scheduling the first appointment  In addition, Consent to Treatment must be signed by all legal guardians prior to scheduling the first appointment    If \"NO\"- Consent to Treatment must be signed by all legal guardians prior to scheduling the first appointment    Behavioral Health Outpatient Intake History -     Presenting Problem (in patient's own words): anxiety, depression    Are there any communication barriers for this patient?     No                                               If yes, please describe barriers:       Are you taking any psychiatric medications? Yes     If \"YES\" -What are they Zoloft     If \"YES\" -Who prescribes?     Has the Patient abused alcohol or other substances in the last 6 months ? No  No concerns of substance abuse are reported.     If \"YES\" -What substance, How much, How often?     If illegal substance: Refer to [facility] (for VALERIA) or [facility].   If Alcohol in excess of 10 drinks per week:  Refer to [facility] (for VALERIA) or [facility] Off[facility]    ACCEPTED as a patient Yes  If \"Yes\" Appointment Date: 8/15/2024 at 1:00 pm    Referred Elsewhere? No  If “Yes” - (Where? Ex: Therapy Anywhere; [facility];  [facility], etc.)       Name of Insurance Co:Medicare A&B  Insurance ID# [id number]  Insurance Phone #[phone]  If ins is primary or secondary?Primary  If patient is a minor, parents information such as Name, D.O.B of " guarantor.

## 2024-08-02 NOTE — TELEPHONE ENCOUNTER
Patient called to follow up. Patient does have a referral in his chart now. Advised to tell patient that [facility] has a therapist there. Can someone reach out to him and get him scheduled?  Thank you!    Jaguar Wilde  Home: [phone]  Cell: [phone]

## 2024-08-05 DIAGNOSIS — G25.81 RESTLESS LEGS SYNDROME: ICD-10-CM

## 2024-08-06 NOTE — TELEPHONE ENCOUNTER
Writer contacted pt and informed him that, unfortunately, due to conflict of interests, his appt with Melinda Naqvi has to be canceled. Pt verbalized understanding. Writer also informed him that he will receive a call as soon as he can be schedule with another provider

## 2024-08-07 RX ORDER — CLONAZEPAM 0.25 MG/1
0.25 TABLET, ORALLY DISINTEGRATING ORAL 2 TIMES DAILY
Qty: 60 TABLET | Refills: 0 | Status: SHIPPED | OUTPATIENT
Start: 2024-08-07

## 2024-08-27 ENCOUNTER — APPOINTMENT (OUTPATIENT)
Dept: LAB | Facility: CLINIC | Age: 74
End: 2024-08-27
Payer: MEDICARE

## 2024-08-27 DIAGNOSIS — R97.20 ELEVATED PROSTATE SPECIFIC ANTIGEN (PSA): ICD-10-CM

## 2024-08-27 LAB
PSA FREE MFR SERPL: 24.2 %
PSA FREE SERPL-MCNC: 1.4 NG/ML
PSA SERPL-MCNC: 5.78 NG/ML (ref 0–4)

## 2024-08-27 PROCEDURE — 36415 COLL VENOUS BLD VENIPUNCTURE: CPT

## 2024-08-27 PROCEDURE — 84154 ASSAY OF PSA FREE: CPT

## 2024-08-27 PROCEDURE — 84153 ASSAY OF PSA TOTAL: CPT

## 2024-09-04 ENCOUNTER — OFFICE VISIT (OUTPATIENT)
Dept: FAMILY MEDICINE CLINIC | Facility: CLINIC | Age: 74
End: 2024-09-04
Payer: MEDICARE

## 2024-09-04 VITALS
HEART RATE: 84 BPM | HEIGHT: 63 IN | SYSTOLIC BLOOD PRESSURE: 108 MMHG | BODY MASS INDEX: 25.52 KG/M2 | WEIGHT: 144 LBS | TEMPERATURE: 98.1 F | RESPIRATION RATE: 18 BRPM | DIASTOLIC BLOOD PRESSURE: 64 MMHG | OXYGEN SATURATION: 98 %

## 2024-09-04 DIAGNOSIS — F33.9 DEPRESSION, RECURRENT (HCC): Primary | ICD-10-CM

## 2024-09-04 DIAGNOSIS — G25.81 RESTLESS LEGS SYNDROME: ICD-10-CM

## 2024-09-04 PROCEDURE — G2211 COMPLEX E/M VISIT ADD ON: HCPCS | Performed by: NURSE PRACTITIONER

## 2024-09-04 PROCEDURE — 99213 OFFICE O/P EST LOW 20 MIN: CPT | Performed by: NURSE PRACTITIONER

## 2024-09-04 RX ORDER — CLONAZEPAM 0.25 MG/1
0.25 TABLET, ORALLY DISINTEGRATING ORAL 2 TIMES DAILY
Qty: 60 TABLET | Refills: 0 | Status: SHIPPED | OUTPATIENT
Start: 2024-09-04

## 2024-09-04 NOTE — PROGRESS NOTES
Ambulatory Visit  Name: Jaguar Wilde      : 1950      MRN: [id number]  Encounter Provider: MARGARITA Thomas  Encounter Date: 2024   Encounter department: [facility] PRIMARY [facility]ARE    Assessment & Plan   1. Depression, recurrent (HCC)  Comments:  Continue Zoloft 50mg daily.  Therapy starting 24  2. Restless legs syndrome  -     clonazePAM (KlonoPIN) 0.25 MG disintegrating tablet; Take 1 tablet (0.25 mg total) by mouth 2 (two) times a day         History of Present Illness      Patient here for follow up visit. Taking 50mg of Zoloft.   Doing well, anxiety and depression have lessened. Able to recover more quickly.    Has a therapy appointment on  in [facility].   Cataract surgery on  for secondary cataract.       Review of Systems   Constitutional: Negative.  Negative for activity change, appetite change, chills, fatigue and fever.   HENT:  Negative for congestion, ear pain, nosebleeds, rhinorrhea and sore throat.    Eyes:  Negative for photophobia, pain, redness and visual disturbance.   Respiratory:  Negative for cough, shortness of breath and wheezing.    Cardiovascular: Negative.  Negative for chest pain.   Gastrointestinal: Negative.  Negative for abdominal pain, constipation, diarrhea and vomiting.   Endocrine: Negative.    Genitourinary:  Negative for difficulty urinating, dysuria and flank pain.   Musculoskeletal: Negative.    Skin:  Negative for color change and rash.   Neurological:  Negative for dizziness, weakness, numbness and headaches.   Hematological:  Negative for adenopathy.   Psychiatric/Behavioral:  Negative for agitation and confusion. The patient is not nervous/anxious.      Past Medical History:   Diagnosis Date    Allergic     Seasonal    Benign neoplasm of colon     Cataract     Chronic rhinitis     Colon polyp     Diverticular disease of colon     Dysphagia     Environmental allergies     GERD (gastroesophageal reflux disease)      Hyperlipidemia     Internal hemorrhoids without complication     PSA (psoriatic arthritis) (HCC)     elevated    Restless leg syndrome     Thyroid disorder     Thyroid nodule      Past Surgical History:   Procedure Laterality Date    CATARACT EXTRACTION  2022    CHOLECYSTECTOMY      COLONOSCOPY  09/22/2010    COLONOSCOPY  06/13/2007    COLONOSCOPY  05/27/2004    COLONOSCOPY  12/09/2015    EYE SURGERY      Both eyes - cataracts    GALLBLADDER SURGERY      OTHER SURGICAL HISTORY  11/2012    abcessed tooth    THYROIDECTOMY      TONSILLECTOMY      UPPER GASTROINTESTINAL ENDOSCOPY  10/30/2013     Family History   Problem Relation Age of Onset    Colon cancer Father     Other Father         metastatic: lymph tissue, breast, thyroid, prostate, liver, lung    Breast cancer Father     Prostate cancer Father     Cancer Father     Other Family         malignant neoplasm of gastrointestinal disease    Heart disease Family     Diabetes Family     Mental illness Mother     Heart disease Mother     Diabetes Mother     Psychiatric Illness Mother     Diabetes Maternal Grandmother     Alcohol abuse Paternal Grandfather     Alcohol abuse Son      Social History     Tobacco Use    Smoking status: Never    Smokeless tobacco: Never   Vaping Use    Vaping status: Never Used   Substance and Sexual Activity    Alcohol use: No    Drug use: No    Sexual activity: Not Currently     Partners: Female     Birth control/protection: Surgical     Comment: Vasectomy     Current Outpatient Medications on File Prior to Visit   Medication Sig    aspirin 81 MG tablet Take 81 mg by mouth    CALCIUM/MAGNESIUM/ZINC FORMULA PO Take 1 tablet by mouth daily    clobetasol (TEMOVATE) 0.05 % ointment Apply topically as needed    docusate sodium (QC Stool Softener) 100 mg capsule Take 1 capsule (100 mg total) by mouth daily    finasteride (PROSCAR) 5 mg tablet Take 1 tablet (5 mg total) by mouth daily    GARLIC PO Take by mouth daily    levothyroxine 75 mcg tablet  "Take 1 tablet (75 mcg total) by mouth daily    Multiple Vitamins-Minerals (LUTEIN-ZEAXANTHIN PO) Take 20 mg by mouth    Multiple Vitamins-Minerals (MULTIVITAMIN PO) Take by mouth daily    Omega-3 Fatty Acids (FISH OIL PO) Take by mouth daily    omeprazole (PriLOSEC) 20 mg delayed release capsule Take 20 mg by mouth    sertraline (ZOLOFT) 50 mg tablet Take 1 tablet (50 mg total) by mouth daily    simvastatin (ZOCOR) 40 mg tablet Take 1 tablet (40 mg total) by mouth daily at bedtime    tamsulosin (FLOMAX) 0.4 mg Take 2 capsules (0.8 mg total) by mouth daily with dinner    [DISCONTINUED] clonazePAM (KlonoPIN) 0.25 MG disintegrating tablet Take 1 tablet (0.25 mg total) by mouth 2 (two) times a day     Allergies   Allergen Reactions    Codeine      Immunization History   Administered Date(s) Administered    COVID-19 PFIZER VACCINE 0.3 ML IM 04/01/2021, 04/22/2021, 11/29/2021, 06/28/2022    COVID-19 Pfizer Vac BIVALENT Nir-sucrose 12 Yr+ IM 03/28/2023    INFLUENZA 12/05/2017, 10/05/2022    Influenza, high dose seasonal 0.7 mL 09/24/2018, 09/25/2019, 09/24/2020, 10/27/2021, 10/05/2022, 10/18/2023    Pneumococcal Conjugate 13-Valent 12/19/2018    Pneumococcal Polysaccharide PPV23 05/13/2020    Tdap 12/19/2018    Zoster 09/06/2016    Zoster Vaccine Recombinant 08/24/2022     Objective     /64   Pulse 84   Temp 98.1 °F (36.7 °C) (Temporal)   Resp 18   Ht 5' 3\" (1.6 m)   Wt 65.3 kg (144 lb)   SpO2 98%   BMI 25.51 kg/m²     Physical Exam  Vitals and nursing note reviewed.   Constitutional:       General: He is not in acute distress.     Appearance: He is well-developed. He is not ill-appearing, toxic-appearing or diaphoretic.   HENT:      Head: Normocephalic and atraumatic.   Pulmonary:      Effort: Pulmonary effort is normal. No respiratory distress.      Breath sounds: Normal breath sounds.   Neurological:      Mental Status: He is alert and oriented to person, place, and time.   Psychiatric:         Mood and " Affect: Mood and affect normal.         Behavior: Behavior normal. Behavior is cooperative.         Thought Content: Thought content normal.         Judgment: Judgment normal.

## 2024-09-20 ENCOUNTER — OFFICE VISIT (OUTPATIENT)
Dept: BEHAVIORAL/MENTAL HEALTH CLINIC | Facility: CLINIC | Age: 74
End: 2024-09-20
Payer: MEDICARE

## 2024-09-20 DIAGNOSIS — F41.9 ANXIETY AND DEPRESSION: ICD-10-CM

## 2024-09-20 DIAGNOSIS — F32.A ANXIETY AND DEPRESSION: ICD-10-CM

## 2024-09-20 PROCEDURE — 90791 PSYCH DIAGNOSTIC EVALUATION: CPT | Performed by: SOCIAL WORKER
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
## 2024-09-20 NOTE — BH TREATMENT PLAN
"Outpatient Behavioral Health Psychotherapy Treatment Plan    Jaguar Wilde  1950     Date of Initial Psychotherapy Assessment: 09/20/2024   Date of Current Treatment Plan: 09/20/24  Treatment Plan Target Date: 03/19/25  Treatment Plan Expiration Date: 03/19/25    Diagnosis:   1. Anxiety and depression  Ambulatory referral to Psych Services          Area(s) of Need: Alleviation of anxiety and depression as he is a controlling (like to be in control) person and does not feel in control and he is non confronttional so he wants things peaceful and status quo    Long Term Goal 1 (in the client's own words): \"To get my worry under control so it is not such a constant negative in my life.\"    Stage of Change: Preparation    Target Date for completion: 03/19/2025     Anticipated therapeutic modalities: CBT and Mindfulness     People identified to complete this goal: Therapist and Ravi      Objective 1: (identify the means of measuring success in meeting the objective): Learn and implement mindfulness techniques daily to try to decrease symptoms of anxiety.      Objective 2: (identify the means of measuring success in meeting the objective): Identify and  process the antecedents to his anxiety       Long Term Goal 2 (in the client's own words): Medication Management    Stage of Change: Action    Target Date for completion: 03/19/2025     Anticipated therapeutic modalities: Medication Management     People identified to complete this goal: PCP and Ravi      Objective 1: (identify the means of measuring success in meeting the objective): Ravi will take medications as prescribed      Objective 2: (identify the means of measuring success in meeting the objective): Ravi will attend appointments as scheduled      I am currently under the care of a [facility] psychiatric provider: no    My [facility] psychiatric provider is: N/A    I am currently taking psychiatric medications: Yes, as prescribed    I feel that I will be " "ready for discharge from mental health care when I reach the following (measurable goal/objective): \"When I can a problem or a perceived problem enters my mind or life I will be able to handle it without the excessive worry, dizziness and oh my God what am I going to do.\"    For children and adults who have a legal guardian:   Has there been any change to custody orders and/or guardianship status? NA. If yes, attach updated documentation.    I have created my Crisis Plan and have been offered a copy of this plan    Behavioral Health Treatment Plan [facility]: Diagnosis and Treatment Plan explained to Jaguar Wilde acknowledges an understanding of their diagnosis. Jaguar Wilde agrees to this treatment plan.    I have been offered a copy of this Treatment Plan. yes      Electronically Signed by Trang Harper    "

## 2024-09-20 NOTE — BH CRISIS PLAN
Client Name: Jaguar Wilde       Client YOB: 1950    [place] Safety Plan      Creation Date: 9/20/24 Update Date: 9/19/25   Created By: Trang Harper       Step 1: Warning Signs:   Warning Signs   Worry   Not feeling like I am in control            Step 2: Internal Coping Strategies:   Internal Coping Strategies   Eat            Step 3: People and social settings that provide distraction:   Name Contact Information   aHn (friend) In cell phone    Places   Walk   Front porch           Step 4: People whom I can ask for help during a crisis:      Name Contact Information    Suni (Wife) In cell phone      Step 5: Professionals or agencies I can contact during a crisis:      Clinican/Agency Name Phone Emergency Contact    None        Local Emergency Department Emergency Department Phone Emergency Department Address    [facility] [phone], [place] [address]        Crisis Phone Numbers:   Suicide Prevention Lifeline: Call or Text  988 Crisis Text Line: Text HOME to 860-150   Please note: Some [facility] do not have a separate number for Child/Adolescent specific crisis. If your county is not listed under Child/Adolescent, please call the adult number for your county      Adult Crisis Numbers: Child/Adolescent Crisis Numbers   [facility]: [phone] [facility]: [phone]   [facility]: [phone] [facility]: [phone]   [facility]: [phone] [place]: [phone]   [facility]: [phone] [facility]: [phone]   [place]: [phone]   [facility]: [phone]   [facility]: [phone]   [facility]: [phone] (daytime) [phone] (after hours, weekends, holidays)      Step 6: Making the environment safer (plan for lethal means safety):   Patient did not identify any lethal methods: Yes     Optional: What is most important to me and worth living for?  "  \"My Family\"     [place] Safety Plan. Bernadette Marcus and Luther Neal. Used with permission of the authors.           "

## 2024-09-22 DIAGNOSIS — F32.A ANXIETY AND DEPRESSION: ICD-10-CM

## 2024-09-22 DIAGNOSIS — F41.9 ANXIETY AND DEPRESSION: ICD-10-CM

## 2024-09-30 DIAGNOSIS — G25.81 RESTLESS LEGS SYNDROME: ICD-10-CM

## 2024-10-01 DIAGNOSIS — N40.0 BENIGN PROSTATIC HYPERPLASIA WITHOUT LOWER URINARY TRACT SYMPTOMS: ICD-10-CM

## 2024-10-01 RX ORDER — TAMSULOSIN HYDROCHLORIDE 0.4 MG/1
0.8 CAPSULE ORAL
Qty: 60 CAPSULE | Refills: 5 | Status: SHIPPED | OUTPATIENT
Start: 2024-10-01 | End: 2024-10-04

## 2024-10-01 RX ORDER — CLONAZEPAM 0.25 MG/1
0.25 TABLET, ORALLY DISINTEGRATING ORAL 2 TIMES DAILY
Qty: 60 TABLET | Refills: 0 | Status: SHIPPED | OUTPATIENT
Start: 2024-10-01

## 2024-10-04 DIAGNOSIS — N40.0 BENIGN PROSTATIC HYPERPLASIA WITHOUT LOWER URINARY TRACT SYMPTOMS: ICD-10-CM

## 2024-10-04 RX ORDER — TAMSULOSIN HYDROCHLORIDE 0.4 MG/1
0.8 CAPSULE ORAL
Qty: 180 CAPSULE | Refills: 1 | Status: SHIPPED | OUTPATIENT
Start: 2024-10-04

## 2024-10-18 ENCOUNTER — SOCIAL WORK (OUTPATIENT)
Dept: BEHAVIORAL/MENTAL HEALTH CLINIC | Facility: CLINIC | Age: 74
End: 2024-10-18
Payer: MEDICARE

## 2024-10-18 DIAGNOSIS — F33.9 DEPRESSION, RECURRENT (HCC): Primary | ICD-10-CM

## 2024-10-18 PROCEDURE — 90837 PSYTX W PT 60 MINUTES: CPT | Performed by: SOCIAL WORKER

## 2024-10-18 NOTE — PSYCH
Behavioral Health Psychotherapy Progress Note    Psychotherapy Provided: Individual Psychotherapy     1. Depression, recurrent (HCC)            Goals addressed in session: Goal 1     DATA: Ravi states that he is able to deescalate his feelings as they happen. One concern right now is with their daughter in law and their son.  They have been having marital problems and they live in [place] so they do not see each other often.  They have never been close with their daughter in law and it got worse when they had kids.  He acknowledges that their son was not the best .  He had an affair, and there was lying.  They would visit and his son's wife would disappear for hours on end. In August things got worse and he was calling them all the time and then things were getting better.  And then she started contacting Ravi and his wife.  She wanted to have a group chat to discuss their son's problems and the marriage.  Ravi suggested a marital counselor.  His son's wife said he was a narcissist, he was gas lighting her and then said she was an alcoholic and she is a child therapist so she knows what to do and does not need a marriage counselor.  Ravi is appropriately dressed in a casual manner, and well groomed.  His thought process is logical and speech is normal rate, volume and content.  We went over the problems with his son [place] at length.  He feels guilty that he hurt another human being.  He said his son and his family aside, he feels like things are not okay with his son.  He goes back and forth with his possibly doing something wrong.  We processed at length.  Support was offered.      During this session, this clinician used the following therapeutic modalities: Cognitive Behavioral Therapy, Mindfulness-based Strategies, and Supportive Psychotherapy    Substance Abuse was not addressed during this session. If the client is diagnosed with a co-occurring substance use disorder, please indicate any changes in the  "frequency or amount of use: N/A. Stage of change for addressing substance use diagnoses: No substance use/Not applicable    ASSESSMENT:  Jaguar Wilde presents with a Euthymic/ normal mood his affect is Normal range and intensity, which is congruent, with his mood and the content of the session. The client has made progress on their goals.       Jaguar Wilde presents with a minimal risk of suicide, minimal risk of self-harm, and minimal risk of harm to others.    For any risk assessment that surpasses a \"low\" rating, a safety plan must be developed.    A safety plan was indicated: no  If yes, describe in detail N/A    PLAN: Between sessions, Jaguar Wilde will try and talk to his son . At the next session, the therapist will use Cognitive Behavioral Therapy, Mindfulness-based Strategies, and Solution-Focused Therapy to address his dysfunctional relationship with their one son.  .    Behavioral Health Treatment Plan and Discharge Planning: Jaguar Wilde is aware of and agrees to continue to work on their treatment plan. They have identified and are working toward their discharge goals. yes    Visit start and stop times:    10/18/24  Start Time: 4824                                             "

## 2024-10-28 DIAGNOSIS — K59.00 CONSTIPATION, UNSPECIFIED CONSTIPATION TYPE: ICD-10-CM

## 2024-10-28 DIAGNOSIS — N40.0 BENIGN PROSTATIC HYPERPLASIA WITHOUT LOWER URINARY TRACT SYMPTOMS: ICD-10-CM

## 2024-10-28 DIAGNOSIS — E03.9 HYPOTHYROIDISM, UNSPECIFIED TYPE: ICD-10-CM

## 2024-10-28 RX ORDER — LEVOTHYROXINE SODIUM 75 UG/1
75 TABLET ORAL DAILY
Qty: 90 TABLET | Refills: 1 | Status: SHIPPED | OUTPATIENT
Start: 2024-10-28

## 2024-10-28 RX ORDER — DOCUSATE SODIUM 100 MG/1
100 CAPSULE, LIQUID FILLED ORAL DAILY
Qty: 100 CAPSULE | Refills: 1 | Status: SHIPPED | OUTPATIENT
Start: 2024-10-28

## 2024-10-28 RX ORDER — FINASTERIDE 5 MG/1
5 TABLET, FILM COATED ORAL DAILY
Qty: 90 TABLET | Refills: 2 | Status: SHIPPED | OUTPATIENT
Start: 2024-10-28

## 2024-10-30 DIAGNOSIS — G25.81 RESTLESS LEGS SYNDROME: ICD-10-CM

## 2024-10-31 RX ORDER — CLONAZEPAM 0.25 MG/1
0.25 TABLET, ORALLY DISINTEGRATING ORAL 2 TIMES DAILY
Qty: 60 TABLET | Refills: 0 | Status: SHIPPED | OUTPATIENT
Start: 2024-10-31

## 2024-10-31 NOTE — TELEPHONE ENCOUNTER
Patient requesting refill(s) of: klonopin 0.25 mg BID    Last filled: 10/1/24 #60 x 0  Last appt: 9/4/24  Next appt: 12/19/24  Pharmacy: [facility]

## 2024-11-09 DIAGNOSIS — F33.9 DEPRESSION, RECURRENT (HCC): Primary | ICD-10-CM

## 2024-11-14 ENCOUNTER — SOCIAL WORK (OUTPATIENT)
Dept: BEHAVIORAL/MENTAL HEALTH CLINIC | Facility: CLINIC | Age: 74
End: 2024-11-14
Payer: MEDICARE

## 2024-11-14 DIAGNOSIS — F33.9 DEPRESSION, RECURRENT (HCC): Primary | ICD-10-CM

## 2024-11-14 PROCEDURE — 90834 PSYTX W PT 45 MINUTES: CPT | Performed by: SOCIAL WORKER

## 2024-11-14 RX ORDER — SERTRALINE HYDROCHLORIDE 25 MG/1
25 TABLET, FILM COATED ORAL DAILY
Qty: 90 TABLET | Refills: 3 | Status: SHIPPED | OUTPATIENT
Start: 2024-11-14 | End: 2024-11-16

## 2024-11-14 NOTE — PSYCH
Behavioral Health Psychotherapy Progress Note    Psychotherapy Provided: Individual Psychotherapy     1. Depression, recurrent (HCC)            Goals addressed in session: Goal 1     DATA: Ravi states that his son and daughter in law are living in the same house but are  and .  He is having problems with the whole situation and he feels his son knows he needs to get away from his wife.  He has control issues with how he feels about his children.  One of their big concerns is that his son lies and Ravi states he does and always has.  His son has admitted that he is an alcoholic.  He has been sober for a month and his estranged wife continues to accusing him of drinking.  He is calling them every other day.  Thanksgiving Day will just be his wife and himself.  Their son from [place] and their son who is local are coming Saturday after Thanksgiving.  They get left overs.  His depression causes him at times to be anxious.  Typically it is after talking to his son who is getting .  She is accusing him of being addicted to [employer] since he started drinking that.  Ravi is appropriately dressed in a casual manner, being adequately groomed.  His thought process is logical and speech is normal rate,.volume and content.  His son's wife is very controlling. We processed this relationship at length.  He does not know all the facts of their financial situation.  He says every morning he walks for an hour and has done this for years.  We tried to process various distractions which can help manage the depression.  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  He watches 2 game shows.        During this session, this clinician used the following therapeutic modalities: Cognitive Behavioral Therapy, Mindfulness-based Strategies, Solution-Focused Therapy, and Supportive Psychotherapy    Substance Abuse was not addressed during this session. If the client is diagnosed with a co-occurring substance use disorder, please  "indicate any changes in the frequency or amount of use: N/A. Stage of change for addressing substance use diagnoses: No substance use/Not applicable    ASSESSMENT:  Jaguar Wilde presents with a Euthymic/ normal mood his affect is Normal range and intensity, which is congruent, with his mood and the content of the session. The client has made progress on their goals.       Jaguar Wilde presents with a minimal risk of suicide, minimal risk of self-harm, and minimal risk of harm to others.    For any risk assessment that surpasses a \"low\" rating, a safety plan must be developed.    A safety plan was indicated: no  If yes, describe in detail N/A    PLAN: Between sessions, Jaguar Wilde will try to keep boundaries with his son and his family. At the next session, the therapist will use Cognitive Behavioral Therapy, Mindfulness-based Strategies, Solution-Focused Therapy, and Supportive Psychotherapy to address his relationship with their son.going through his divorce.    Behavioral Health Treatment Plan and Discharge Planning: Jaguar Wilde is aware of and agrees to continue to work on their treatment plan. They have identified and are working toward their discharge goals. yes    Visit start and stop times:    11/14/24  Start Time: 1431                                                 "

## 2024-11-15 ENCOUNTER — PATIENT MESSAGE (OUTPATIENT)
Dept: FAMILY MEDICINE CLINIC | Facility: CLINIC | Age: 74
End: 2024-11-15

## 2024-11-15 DIAGNOSIS — F32.A ANXIETY AND DEPRESSION: ICD-10-CM

## 2024-11-15 DIAGNOSIS — F41.9 ANXIETY AND DEPRESSION: ICD-10-CM

## 2024-12-01 DIAGNOSIS — E78.5 HYPERLIPIDEMIA, UNSPECIFIED HYPERLIPIDEMIA TYPE: ICD-10-CM

## 2024-12-02 DIAGNOSIS — G25.81 RESTLESS LEGS SYNDROME: ICD-10-CM

## 2024-12-03 RX ORDER — SIMVASTATIN 40 MG
40 TABLET ORAL
Qty: 90 TABLET | Refills: 1 | Status: SHIPPED | OUTPATIENT
Start: 2024-12-03

## 2024-12-03 RX ORDER — CLONAZEPAM 0.25 MG/1
0.25 TABLET, ORALLY DISINTEGRATING ORAL 2 TIMES DAILY
Qty: 60 TABLET | Refills: 0 | Status: SHIPPED | OUTPATIENT
Start: 2024-12-03

## 2024-12-03 NOTE — TELEPHONE ENCOUNTER
Patient requesting refill(s) of: klonopin 0.25 mg BID    Last filled: 10/31/24 #60 x 0  Last appt: 9/4/24  Next appt: 12/19/24  Pharmacy: [facility]

## 2024-12-05 ENCOUNTER — SOCIAL WORK (OUTPATIENT)
Dept: BEHAVIORAL/MENTAL HEALTH CLINIC | Facility: CLINIC | Age: 74
End: 2024-12-05
Payer: MEDICARE

## 2024-12-05 DIAGNOSIS — F33.9 DEPRESSION, RECURRENT (HCC): Primary | ICD-10-CM

## 2024-12-05 PROCEDURE — 90837 PSYTX W PT 60 MINUTES: CPT | Performed by: SOCIAL WORKER

## 2024-12-05 NOTE — PSYCH
"Behavioral Health Psychotherapy Progress Note    Psychotherapy Provided: Individual Psychotherapy     1. Depression, recurrent (HCC)            Goals addressed in session: Goal 1     DATA: Ravi states that they had two Thanksgivings with his children.  He says that his son in [place]'s situation is just getting worse and his son does not want to realize it is over and he needs to move on.  Right now his son's estranged wife is bad mouthing him to the kids and they are still living together. His son does not have a .  He states he cannot afford an apartment.  He states that they would help with 1st and last months rent but not month to month.  He wants to be able to handle things as they happen. He says that his vertigo is set off by his anxiety.  He lacks control and he does not like not being in control.  His children are all adults and he has little to no control.  His son in [place] is  from his wife, but they remain living together.  Ravi is appropriately dressed and adequately groomed, being small in stature.  His thought process is logical and speech is normal rate, volume and content.  His daughter in law is a \"therapist\" and she is telling him things which maybe impacting their son.  We processed what he has control over and what he does not and the effect on his anxiety.  .      During this session, this clinician used the following therapeutic modalities: Cognitive Behavioral Therapy, Mindfulness-based Strategies, and Supportive Psychotherapy    Substance Abuse was not addressed during this session. If the client is diagnosed with a co-occurring substance use disorder, please indicate any changes in the frequency or amount of use: N/A. Stage of change for addressing substance use diagnoses: No substance use/Not applicable    ASSESSMENT:  Jaguar Wilde presents with a Euthymic/ normal mood his affect is Normal range and intensity, which is congruent, with his mood and the content of the " "session. The client has made progress on their goals.       Jaguar Wilde presents with a minimal risk of suicide, minimal risk of self-harm, and minimal risk of harm to others.    For any risk assessment that surpasses a \"low\" rating, a safety plan must be developed.    A safety plan was indicated: no  If yes, describe in detail N/A    PLAN: Between sessions, Jaguar Wilde will try to remind himself what he does not have control over. . At the next session, the therapist will use CBT and ACT  to address his anxiety which is fueled by his lack of control. .    Behavioral Health Treatment Plan and Discharge Planning: Jaguar Wilde is aware of and agrees to continue to work on their treatment plan. They have identified and are working toward their discharge goals. yes    Visit start and stop times:    12/05/24  Start Time: 3085                                                 "

## 2024-12-16 ENCOUNTER — APPOINTMENT (OUTPATIENT)
Dept: LAB | Facility: CLINIC | Age: 74
End: 2024-12-16
Payer: MEDICARE

## 2024-12-16 DIAGNOSIS — E78.00 HYPERCHOLESTEROLEMIA: ICD-10-CM

## 2024-12-16 DIAGNOSIS — K21.00 GASTROESOPHAGEAL REFLUX DISEASE WITH ESOPHAGITIS, UNSPECIFIED WHETHER HEMORRHAGE: ICD-10-CM

## 2024-12-16 DIAGNOSIS — E03.9 HYPOTHYROIDISM, UNSPECIFIED TYPE: ICD-10-CM

## 2024-12-16 LAB
ALBUMIN SERPL BCG-MCNC: 4.4 G/DL (ref 3.5–5)
ALP SERPL-CCNC: 40 U/L (ref 34–104)
ALT SERPL W P-5'-P-CCNC: 21 U/L (ref 7–52)
ANION GAP SERPL CALCULATED.3IONS-SCNC: 8 MMOL/L (ref 4–13)
AST SERPL W P-5'-P-CCNC: 20 U/L (ref 13–39)
BASOPHILS # BLD AUTO: 0.05 THOUSANDS/ÂΜL (ref 0–0.1)
BASOPHILS NFR BLD AUTO: 1 % (ref 0–1)
BILIRUB SERPL-MCNC: 0.64 MG/DL (ref 0.2–1)
BUN SERPL-MCNC: 15 MG/DL (ref 5–25)
CALCIUM SERPL-MCNC: 9.5 MG/DL (ref 8.4–10.2)
CHLORIDE SERPL-SCNC: 104 MMOL/L (ref 96–108)
CHOLEST SERPL-MCNC: 144 MG/DL (ref ?–200)
CO2 SERPL-SCNC: 28 MMOL/L (ref 21–32)
CREAT SERPL-MCNC: 0.76 MG/DL (ref 0.6–1.3)
EOSINOPHIL # BLD AUTO: 0.17 THOUSAND/ÂΜL (ref 0–0.61)
EOSINOPHIL NFR BLD AUTO: 3 % (ref 0–6)
ERYTHROCYTE [DISTWIDTH] IN BLOOD BY AUTOMATED COUNT: 12.3 % (ref 11.6–15.1)
GFR SERPL CREATININE-BSD FRML MDRD: 91 ML/MIN/1.73SQ M
GLUCOSE P FAST SERPL-MCNC: 96 MG/DL (ref 65–99)
HCT VFR BLD AUTO: 46.2 % (ref 36.5–49.3)
HDLC SERPL-MCNC: 47 MG/DL
HGB BLD-MCNC: 15.4 G/DL (ref 12–17)
IMM GRANULOCYTES # BLD AUTO: 0.04 THOUSAND/UL (ref 0–0.2)
IMM GRANULOCYTES NFR BLD AUTO: 1 % (ref 0–2)
LDLC SERPL CALC-MCNC: 79 MG/DL (ref 0–100)
LYMPHOCYTES # BLD AUTO: 1.63 THOUSANDS/ÂΜL (ref 0.6–4.47)
LYMPHOCYTES NFR BLD AUTO: 24 % (ref 14–44)
MCH RBC QN AUTO: 31.4 PG (ref 26.8–34.3)
MCHC RBC AUTO-ENTMCNC: 33.3 G/DL (ref 31.4–37.4)
MCV RBC AUTO: 94 FL (ref 82–98)
MONOCYTES # BLD AUTO: 0.52 THOUSAND/ÂΜL (ref 0.17–1.22)
MONOCYTES NFR BLD AUTO: 8 % (ref 4–12)
NEUTROPHILS # BLD AUTO: 4.49 THOUSANDS/ÂΜL (ref 1.85–7.62)
NEUTS SEG NFR BLD AUTO: 63 % (ref 43–75)
NONHDLC SERPL-MCNC: 97 MG/DL
NRBC BLD AUTO-RTO: 0 /100 WBCS
PLATELET # BLD AUTO: 161 THOUSANDS/UL (ref 149–390)
PMV BLD AUTO: 9.6 FL (ref 8.9–12.7)
POTASSIUM SERPL-SCNC: 4.1 MMOL/L (ref 3.5–5.3)
PROT SERPL-MCNC: 6.6 G/DL (ref 6.4–8.4)
RBC # BLD AUTO: 4.91 MILLION/UL (ref 3.88–5.62)
SODIUM SERPL-SCNC: 140 MMOL/L (ref 135–147)
TRIGL SERPL-MCNC: 88 MG/DL (ref ?–150)
TSH SERPL DL<=0.05 MIU/L-ACNC: 2.1 UIU/ML (ref 0.45–4.5)
WBC # BLD AUTO: 6.9 THOUSAND/UL (ref 4.31–10.16)

## 2024-12-16 PROCEDURE — 85025 COMPLETE CBC W/AUTO DIFF WBC: CPT

## 2024-12-16 PROCEDURE — 80061 LIPID PANEL: CPT

## 2024-12-16 PROCEDURE — 36415 COLL VENOUS BLD VENIPUNCTURE: CPT

## 2024-12-16 PROCEDURE — 84443 ASSAY THYROID STIM HORMONE: CPT

## 2024-12-16 PROCEDURE — 80053 COMPREHEN METABOLIC PANEL: CPT

## 2024-12-19 ENCOUNTER — OFFICE VISIT (OUTPATIENT)
Dept: FAMILY MEDICINE CLINIC | Facility: CLINIC | Age: 74
End: 2024-12-19
Payer: MEDICARE

## 2024-12-19 VITALS
DIASTOLIC BLOOD PRESSURE: 62 MMHG | SYSTOLIC BLOOD PRESSURE: 110 MMHG | BODY MASS INDEX: 25.41 KG/M2 | HEIGHT: 63 IN | WEIGHT: 143.4 LBS | OXYGEN SATURATION: 98 % | HEART RATE: 91 BPM | TEMPERATURE: 97.6 F

## 2024-12-19 DIAGNOSIS — R73.01 IMPAIRED FASTING GLUCOSE: ICD-10-CM

## 2024-12-19 DIAGNOSIS — Z00.00 MEDICARE ANNUAL WELLNESS VISIT, SUBSEQUENT: ICD-10-CM

## 2024-12-19 DIAGNOSIS — E78.00 HYPERCHOLESTEROLEMIA: Primary | ICD-10-CM

## 2024-12-19 DIAGNOSIS — G25.81 RESTLESS LEGS SYNDROME: ICD-10-CM

## 2024-12-19 DIAGNOSIS — E03.8 OTHER SPECIFIED HYPOTHYROIDISM: ICD-10-CM

## 2024-12-19 DIAGNOSIS — E08.49 DIABETES DUE TO UNDRL CONDITION W OTH DIABETIC NEURO COMP (HCC): ICD-10-CM

## 2024-12-19 DIAGNOSIS — K21.00 GASTROESOPHAGEAL REFLUX DISEASE WITH ESOPHAGITIS, UNSPECIFIED WHETHER HEMORRHAGE: ICD-10-CM

## 2024-12-19 PROCEDURE — G0438 PPPS, INITIAL VISIT: HCPCS | Performed by: NURSE PRACTITIONER

## 2024-12-19 RX ORDER — CLONAZEPAM 0.25 MG/1
0.25 TABLET, ORALLY DISINTEGRATING ORAL 2 TIMES DAILY
Qty: 60 TABLET | Refills: 0 | Status: SHIPPED | OUTPATIENT
Start: 2024-12-19

## 2024-12-19 NOTE — ASSESSMENT & PLAN NOTE
Orders:    clonazePAM (KlonoPIN) 0.25 MG disintegrating tablet; Take 1 tablet (0.25 mg total) by mouth 2 (two) times a day

## 2024-12-19 NOTE — PATIENT INSTRUCTIONS
Medicare Preventive Visit Patient Instructions  Thank you for completing your Welcome to Medicare Visit or Medicare Annual Wellness Visit today. Your next wellness visit will be due in one year (12/20/2025).  The screening/preventive services that you may require over the next 5-10 years are detailed below. Some tests may not apply to you based off risk factors and/or age. Screening tests ordered at today's visit but not completed yet may show as past due. Also, please note that scanned in results may not display below.  Preventive Screenings:  Service Recommendations Previous Testing/Comments   Colorectal Cancer Screening  Colonoscopy    Fecal Occult Blood Test (FOBT)/Fecal Immunochemical Test (FIT)  Fecal DNA/Cologuard Test  Flexible Sigmoidoscopy Age: 45-75 years old   Colonoscopy: every 10 years (May be performed more frequently if at higher risk)  OR  FOBT/FIT: every 1 year  OR  Cologuard: every 3 years  OR  Sigmoidoscopy: every 5 years  Screening may be recommended earlier than age 45 if at higher risk for colorectal cancer. Also, an individualized decision between you and your healthcare provider will decide whether screening between the ages of 76-85 would be appropriate. Colonoscopy: 05/04/2022  FOBT/FIT: Not on file  Cologuard: Not on file  Sigmoidoscopy: Not on file    Screening Current     Prostate Cancer Screening Individualized decision between patient and health care provider in men between ages of 55-69   Medicare will cover every 12 months beginning on the day after your 50th birthday PSA: 5.777 ng/mL     Screening Current     Hepatitis C Screening Once for adults born between 1945 and 1965  More frequently in patients at high risk for Hepatitis C Hep C Antibody: 12/31/2018    Screening Current   Diabetes Screening 1-2 times per year if you're at risk for diabetes or have pre-diabetes Fasting glucose: 96 mg/dL (12/16/2024)  A1C: 5.6 % (6/12/2024)  Screening Current   Cholesterol Screening Once every  5 years if you don't have a lipid disorder. May order more often based on risk factors. Lipid panel: 12/16/2024  History Lipid Disorder  Screening Current      Other Preventive Screenings Covered by Medicare:  Abdominal Aortic Aneurysm (AAA) Screening: covered once if your at risk. You're considered to be at risk if you have a family history of AAA or a male between the age of 65-75 who smoking at least 100 cigarettes in your lifetime.  Lung Cancer Screening: covers low dose CT scan once per year if you meet all of the following conditions: (1) Age 55-77; (2) No signs or symptoms of lung cancer; (3) Current smoker or have quit smoking within the last 15 years; (4) You have a tobacco smoking history of at least 20 pack years (packs per day x number of years you smoked); (5) You get a written order from a healthcare provider.  Glaucoma Screening: covered annually if you're considered high risk: (1) You have diabetes OR (2) Family history of glaucoma OR (3)  aged 50 and older OR (4)  American aged 65 and older  Osteoporosis Screening: covered every 2 years if you meet one of the following conditions: (1) Have a vertebral abnormality; (2) On glucocorticoid therapy for more than 3 months; (3) Have primary hyperparathyroidism; (4) On osteoporosis medications and need to assess response to drug therapy.  HIV Screening: covered annually if you're between the age of 15-65. Also covered annually if you are younger than 15 and older than 65 with risk factors for HIV infection. For pregnant patients, it is covered up to 3 times per pregnancy.    Immunizations:  Immunization Recommendations   Influenza Vaccine Annual influenza vaccination during flu season is recommended for all persons aged >= 6 months who do not have contraindications   Pneumococcal Vaccine   * Pneumococcal conjugate vaccine = PCV13 (Prevnar 13), PCV15 (Vaxneuvance), PCV20 (Prevnar 20)  * Pneumococcal polysaccharide vaccine = PPSV23  (Pneumovax) Adults 19-63 yo with certain risk factors or if 65+ yo  If never received any pneumonia vaccine: recommend Prevnar 20 (PCV20)  Give PCV20 if previously received 1 dose of PCV13 or PPSV23   Hepatitis B Vaccine 3 dose series if at intermediate or high risk (ex: diabetes, end stage renal disease, liver disease)   Respiratory syncytial virus (RSV) Vaccine - COVERED BY MEDICARE PART D  * RSVPreF3 (Arexvy) CDC recommends that adults 60 years of age and older may receive a single dose of RSV vaccine using shared clinical decision-making (SCDM)   Tetanus (Td) Vaccine - COST NOT COVERED BY MEDICARE PART B Following completion of primary series, a booster dose should be given every 10 years to maintain immunity against tetanus. Td may also be given as tetanus wound prophylaxis.   Tdap Vaccine - COST NOT COVERED BY MEDICARE PART B Recommended at least once for all adults. For pregnant patients, recommended with each pregnancy.   Shingles Vaccine (Shingrix) - COST NOT COVERED BY MEDICARE PART B  2 shot series recommended in those 19 years and older who have or will have weakened immune systems or those 50 years and older     Health Maintenance Due:      Topic Date Due   • Colorectal Cancer Screening  05/04/2027   • Hepatitis C Screening  Completed     Immunizations Due:      Topic Date Due   • COVID-19 Vaccine (7 - 2024-25 season) 11/08/2024     Advance Directives   What are advance directives?  Advance directives are legal documents that state your wishes and plans for medical care. These plans are made ahead of time in case you lose your ability to make decisions for yourself. Advance directives can apply to any medical decision, such as the treatments you want, and if you want to donate organs.   What are the types of advance directives?  There are many types of advance directives, and each state has rules about how to use them. You may choose a combination of any of the following:  Living will:  This is a  written record of the treatment you want. You can also choose which treatments you do not want, which to limit, and which to stop at a certain time. This includes surgery, medicine, IV fluid, and tube feedings.   Durable power of  for healthcare (DPAHC):  This is a written record that states who you want to make healthcare choices for you when you are unable to make them for yourself. This person, called a proxy, is usually a family member or a friend. You may choose more than 1 proxy.  Do not resuscitate (DNR) order:  A DNR order is used in case your heart stops beating or you stop breathing. It is a request not to have certain forms of treatment, such as CPR. A DNR order may be included in other types of advance directives.  Medical directive:  This covers the care that you want if you are in a coma, near death, or unable to make decisions for yourself. You can list the treatments you want for each condition. Treatment may include pain medicine, surgery, blood transfusions, dialysis, IV or tube feedings, and a ventilator (breathing machine).  Values history:  This document has questions about your views, beliefs, and how you feel and think about life. This information can help others choose the care that you would choose.  Why are advance directives important?  An advance directive helps you control your care. Although spoken wishes may be used, it is better to have your wishes written down. Spoken wishes can be misunderstood, or not followed. Treatments may be given even if you do not want them. An advance directive may make it easier for your family to make difficult choices about your care.   Weight Management   Why it is important to manage your weight:  Being overweight increases your risk of health conditions such as heart disease, high blood pressure, type 2 diabetes, and certain types of cancer. It can also increase your risk for osteoarthritis, sleep apnea, and other respiratory problems. Aim for  a slow, steady weight loss. Even a small amount of weight loss can lower your risk of health problems.  How to lose weight safely:  A safe and healthy way to lose weight is to eat fewer calories and get regular exercise. You can lose up about 1 pound a week by decreasing the number of calories you eat by 500 calories each day.   Healthy meal plan for weight management:  A healthy meal plan includes a variety of foods, contains fewer calories, and helps you stay healthy. A healthy meal plan includes the following:  Eat whole-grain foods more often.  A healthy meal plan should contain fiber. Fiber is the part of grains, fruits, and vegetables that is not broken down by your body. Whole-grain foods are healthy and provide extra fiber in your diet. Some examples of whole-grain foods are whole-wheat breads and pastas, oatmeal, brown rice, and bulgur.  Eat a variety of vegetables every day.  Include dark, leafy greens such as spinach, kale, jordan greens, and mustard greens. Eat yellow and orange vegetables such as carrots, sweet potatoes, and winter squash.   Eat a variety of fruits every day.  Choose fresh or canned fruit (canned in its own juice or light syrup) instead of juice. Fruit juice has very little or no fiber.  Eat low-fat dairy foods.  Drink fat-free (skim) milk or 1% milk. Eat fat-free yogurt and low-fat cottage cheese. Try low-fat cheeses such as mozzarella and other reduced-fat cheeses.  Choose meat and other protein foods that are low in fat.  Choose beans or other legumes such as split peas or lentils. Choose fish, skinless poultry (chicken or turkey), or lean cuts of red meat (beef or pork). Before you cook meat or poultry, cut off any visible fat.   Use less fat and oil.  Try baking foods instead of frying them. Add less fat, such as margarine, sour cream, regular salad dressing and mayonnaise to foods. Eat fewer high-fat foods. Some examples of high-fat foods include french fries, doughnuts, ice  cream, and cakes.  Eat fewer sweets.  Limit foods and drinks that are high in sugar. This includes candy, cookies, regular soda, and sweetened drinks.  Exercise:  Exercise at least 30 minutes per day on most days of the week. Some examples of exercise include walking, biking, dancing, and swimming. You can also fit in more physical activity by taking the stairs instead of the elevator or parking farther away from stores. Ask your healthcare provider about the best exercise plan for you.      © Copyright [employer] 2018 Information is for End User's use only and may not be sold, redistributed or otherwise used for commercial purposes. All illustrations and images included in CareNotes® are the copyrighted property of [employer] or [facility]

## 2024-12-19 NOTE — PROGRESS NOTES
Name: Jaguar Wilde      : 1950      MRN: [id number]  Encounter Provider: MARGARITA Thomas  Encounter Date: 2024   Encounter department: [facility]    Assessment & Plan  Restless legs syndrome    Orders:    clonazePAM (KlonoPIN) 0.25 MG disintegrating tablet; Take 1 tablet (0.25 mg total) by mouth 2 (two) times a day    Hypercholesterolemia         Gastroesophageal reflux disease with esophagitis, unspecified whether hemorrhage    Orders:    CBC and differential; Future    Other specified hypothyroidism    Orders:    TSH, 3rd generation with Free T4 reflex; Future    Medicare annual wellness visit, subsequent    Orders:    Comprehensive metabolic panel; Future    Hemoglobin A1C; Future    TSH, 3rd generation with Free T4 reflex; Future    CBC and differential; Future    clonazePAM (KlonoPIN) 0.25 MG disintegrating tablet; Take 1 tablet (0.25 mg total) by mouth 2 (two) times a day    Impaired fasting glucose    Orders:    Hemoglobin A1C; Future    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 Personalized health advice and appropriate referrals for health education or preventive services given if needed, as noted in patient's After Visit Summary.    History of Present Illness      Anxiety- doing well on 50mg zoloft and therapy.        Patient Care Team:  MARGARITA Thomas as PCP - General (Family Medicine)    Review of Systems   Constitutional: Negative.  Negative for activity change, appetite change, chills, fatigue and fever.   HENT:  Negative for congestion, ear pain, nosebleeds, rhinorrhea and sore throat.    Eyes:  Negative for photophobia, pain, redness and visual disturbance.   Respiratory:  Negative for cough, shortness  of breath and wheezing.    Cardiovascular: Negative.  Negative for chest pain.   Gastrointestinal: Negative.  Negative for abdominal pain, constipation, diarrhea and vomiting.   Endocrine: Negative.    Genitourinary:  Negative for difficulty urinating, dysuria and flank pain.   Musculoskeletal: Negative.    Skin:  Negative for color change and rash.   Neurological: Negative.  Negative for dizziness, weakness, numbness and headaches.   Hematological:  Negative for adenopathy.   Psychiatric/Behavioral: Negative.  Negative for agitation and confusion. The patient is not nervous/anxious.      Medical History Reviewed by provider this encounter:       Annual Wellness Visit Questionnaire   Jaguar is here for his Subsequent Wellness visit. Last Medicare Wellness visit information reviewed, patient interviewed, no change since last AWV.     Health Risk Assessment:   Patient rates overall health as very good. Patient feels that their physical health rating is same. Patient is very satisfied with their life. Eyesight was rated as same. Hearing was rated as same. Patient feels that their emotional and mental health rating is slightly better. Patients states they are never, rarely angry. Patient states they are sometimes unusually tired/fatigued. Pain experienced in the last 7 days has been none. Patient states that he has experienced no weight loss or gain in last 6 months.     Depression Screening:   PHQ-2 Score: 1  PHQ-9 Score: 2      Fall Risk Screening:   In the past year, patient has experienced: no history of falling in past year      Home Safety:  Patient does not have trouble with stairs inside or outside of their home. Patient has working smoke alarms and has working carbon monoxide detector. Home safety hazards include: none.     Nutrition:   Current diet is Regular and Diabetic.     Medications:   Patient is currently taking over-the-counter supplements. OTC medications include: see medication list. Patient is able  to manage medications.     Activities of Daily Living (ADLs)/Instrumental Activities of Daily Living (IADLs):   Walk and transfer into and out of bed and chair?: Yes  Dress and groom yourself?: Yes    Bathe or shower yourself?: Yes    Feed yourself? Yes  Do your laundry/housekeeping?: Yes  Manage your money, pay your bills and track your expenses?: Yes  Make your own meals?: Yes    Do your own shopping?: Yes    Previous Hospitalizations:   Any hospitalizations or ED visits within the last 12 months?: No      Advance Care Planning:   Living will: Yes    Durable POA for healthcare: Yes    Advanced directive: Yes      Cognitive Screening:   Provider or family/friend/caregiver concerned regarding cognition?: No    PREVENTIVE SCREENINGS      Cardiovascular Screening:    General: History Lipid Disorder and Screening Current      Diabetes Screening:     General: Screening Current      Colorectal Cancer Screening:     General: Screening Current      Prostate Cancer Screening:    General: Screening Current      Osteoporosis Screening:    General: Screening Not Indicated      Abdominal Aortic Aneurysm (AAA) Screening:    Risk factors include: age between 65-76 yo        General: Screening Not Indicated      Lung Cancer Screening:     General: Screening Not Indicated      Hepatitis C Screening:    General: Screening Current    Screening, Brief Intervention, and Referral to Treatment (SBIRT)    Screening  Typical number of drinks in a day: 0  Typical number of drinks in a week: 0  Interpretation: Low risk drinking behavior.    AUDIT-C Screenin) How often did you have a drink containing alcohol in the past year? never  2) How many drinks did you have on a typical day when you were drinking in the past year? 0  3) How often did you have 6 or more drinks on one occasion in the past year? never    AUDIT-C Score: 0  Interpretation: Score 0-3 (male): Negative screen for alcohol misuse    Single Item Drug Screening:  How often  "have you used an illegal drug (including marijuana) or a prescription medication for non-medical reasons in the past year? never    Single Item Drug Screen Score: 0  Interpretation: Negative screen for possible drug use disorder    Brief Intervention  Alcohol & drug use screenings were reviewed. No concerns regarding substance use disorder identified.     Other Counseling Topics:   Car/seat belt/driving safety, sunscreen and regular weightbearing exercise and calcium and vitamin D intake.     Social Drivers of Health     Financial Resource Strain: Low Risk  (12/8/2023)    Overall Financial Resource Strain (CARDIA)     Difficulty of Paying Living Expenses: Not hard at all   Food Insecurity: No Food Insecurity (12/12/2024)    Hunger Vital Sign     Worried About Running Out of Food in the Last Year: Never true     Ran Out of Food in the Last Year: Never true   Transportation Needs: No Transportation Needs (12/12/2024)    PRAPARE - Transportation     Lack of Transportation (Medical): No     Lack of Transportation (Non-Medical): No   Housing Stability: Low Risk  (12/12/2024)    Housing Stability Vital Sign     Unable to Pay for Housing in the Last Year: No     Number of Times Moved in the Last Year: 0     Homeless in the Last Year: No   Utilities: Not At Risk (12/12/2024)    [facility] Utilities     Threatened with loss of utilities: No     No results found.    Objective   /62   Pulse 91   Temp 97.6 °F (36.4 °C)   Ht 5' 3\" (1.6 m)   Wt 65 kg (143 lb 6.4 oz)   SpO2 98%   BMI 25.40 kg/m²     Physical Exam  Vitals and nursing note reviewed.   Constitutional:       General: He is not in acute distress.     Appearance: He is well-developed. He is not diaphoretic.   Cardiovascular:      Rate and Rhythm: Normal rate and regular rhythm.      Pulses:           Dorsalis pedis pulses are 2+ on the right side and 2+ on the left side.      Heart sounds: Normal heart sounds. No murmur heard.  Pulmonary:      Effort: Pulmonary " effort is normal. No respiratory distress.      Breath sounds: Normal breath sounds. No wheezing or rales.   Musculoskeletal:         General: Normal range of motion.   Feet:      Right foot:      Skin integrity: No ulcer, skin breakdown, erythema, warmth, callus or dry skin.      Left foot:      Skin integrity: No ulcer, skin breakdown, erythema, warmth, callus or dry skin.   Skin:     General: Skin is warm and dry.      Capillary Refill: Capillary refill takes less than 2 seconds.      Findings: No erythema or rash.   Neurological:      General: No focal deficit present.      Mental Status: He is alert and oriented to person, place, and time.   Psychiatric:         Behavior: Behavior normal. Behavior is cooperative.         Thought Content: Thought content normal.

## 2024-12-20 ENCOUNTER — SOCIAL WORK (OUTPATIENT)
Dept: BEHAVIORAL/MENTAL HEALTH CLINIC | Facility: CLINIC | Age: 74
End: 2024-12-20
Payer: MEDICARE

## 2024-12-20 DIAGNOSIS — F41.1 GENERALIZED ANXIETY DISORDER: Primary | ICD-10-CM

## 2024-12-20 DIAGNOSIS — F33.9 DEPRESSION, RECURRENT (HCC): ICD-10-CM

## 2024-12-20 PROCEDURE — 90837 PSYTX W PT 60 MINUTES: CPT | Performed by: SOCIAL WORKER

## 2024-12-20 NOTE — PSYCH
"Behavioral Health Psychotherapy Progress Note    Psychotherapy Provided: Individual Psychotherapy     1. Generalized anxiety disorder        2. Depression, recurrent (HCC)            Goals addressed in session: Goal 1     DATA: Jaguar states that his daughter in law continues to make his sons life difficult and his son is a people pleaser and tries to keep the peace.  His son is not calling everyday and not sharing everything saying it is the same old thing.  He says that their son is intimidated by his wife saying she is going to call the police.  He states that he knows his son is not an noris but she is  taking small things and making them into big things.  The have an unofficial agreement when she is parenting the children he needs to be off the property.  He is appropriately dressed in a casual manner, and well groomed being small in stature.  His thought process is logical and speech is normal rate, volume and content.  Their son finally got a .  His wife did call the police one night. He was at least a little more assertive and his  addressed the arrangements that she had drawn up.  He was able to use a positive coping skill when his son was meeting with the police and he is using distraction with some success.  He is using positive self talk.  We processed his guilt over not feeling like he was a good father.  He never taught his sons \"boy stuff\".  We processed at length.  His first wife was controlling and she walked all over him and he let it happen.     During this session, this clinician used the following therapeutic modalities: Cognitive Behavioral Therapy, Mindfulness-based Strategies, Supportive Psychotherapy, and ACT    Substance Abuse was not addressed during this session. If the client is diagnosed with a co-occurring substance use disorder, please indicate any changes in the frequency or amount of use: N/A. Stage of change for addressing substance use diagnoses: No substance use/Not " "applicable    ASSESSMENT:  Jaguar Wilde presents with a Euthymic/ normal mood his affect is Normal range and intensity, which is congruent, with his mood and the content of the session. The client has made progress on their goals.       Jaguar Wilde presents with a minimal risk of suicide, minimal risk of self-harm, and minimal risk of harm to others.    For any risk assessment that surpasses a \"low\" rating, a safety plan must be developed.    A safety plan was indicated: no  If yes, describe in detail N/A    PLAN: Between sessions, Jaugar Wilde will try to keep his boundaries with his son. At the next session, the therapist will use Cognitive Behavioral Therapy, Mindfulness-based Strategies, Supportive Psychotherapy, and ACT  to address his relationship with his son which heightens his anxiety.    Behavioral Health Treatment Plan and Discharge Planning: Jaguar Wilde is aware of and agrees to continue to work on their treatment plan. They have identified and are working toward their discharge goals. yes    Depression Follow-up Plan Completed: Not applicable    Visit start and stop times:    12/20/24  Start Time: 1518  Stop Time: 1611  Total Visit Time: 53 minutes                                           "

## 2025-01-13 ENCOUNTER — TELEPHONE (OUTPATIENT)
Dept: FAMILY MEDICINE CLINIC | Facility: CLINIC | Age: 75
End: 2025-01-13

## 2025-01-23 ENCOUNTER — SOCIAL WORK (OUTPATIENT)
Dept: BEHAVIORAL/MENTAL HEALTH CLINIC | Facility: CLINIC | Age: 75
End: 2025-01-23
Payer: MEDICARE

## 2025-01-23 DIAGNOSIS — F33.9 DEPRESSION, RECURRENT (HCC): ICD-10-CM

## 2025-01-23 DIAGNOSIS — F41.1 GENERALIZED ANXIETY DISORDER: Primary | ICD-10-CM

## 2025-01-23 PROCEDURE — 90837 PSYTX W PT 60 MINUTES: CPT | Performed by: SOCIAL WORKER

## 2025-01-23 NOTE — PSYCH
Behavioral Health Psychotherapy Progress Note    Psychotherapy Provided: Individual Psychotherapy     1. Generalized anxiety disorder        2. Depression, recurrent (HCC)            Goals addressed in session: Goal 1     DATA: Jaguar states that his son in [place] has a  now.  He says that not much has changed with the situation there.   They do not get along in the same house.  He went into detail about the situation from the perspective that he is receiving from their son.  He states that they are trying to work it out where they parent separately and she bad mouths him to the children.  His son's wife is less than rational.  He is appropriately dressed in a casual manner, being small in stature. His thought process is logical and speech is normal rate, volume and content.  He can listen to his son and not get all worked up since her reminds himself that he is not in control.  He feels sadness for his son instead of anxiety for himself.  His son is looking at apartments on line.  He told his son he might want to go and look at apartments in person.  His son's children are running the household and the parents overly cater to them from what Ravi described.  We processed at length due to his being focused on his son.  He says him and his wife seem to be more considerate of each other due to watching what his son is going through.  He admits he was making small problems into big problems and is now identifying what he can control and what he does not.  He was able to identify in detail about what he has control over.        During this session, this clinician used the following therapeutic modalities: Cognitive Behavioral Therapy, Mindfulness-based Strategies, Supportive Psychotherapy, and ACT    Substance Abuse was not addressed during this session. If the client is diagnosed with a co-occurring substance use disorder, please indicate any changes in the frequency or amount of use: N/A. Stage of change for  "addressing substance use diagnoses: No substance use/Not applicable    ASSESSMENT:  Jaguar Wilde presents with a Euthymic/ normal mood his affect is Normal range and intensity, which is congruent, with his mood and the content of the session. The client has made progress on their goals.     Jaguar Wilde presents with a minimal risk of suicide, minimal risk of self-harm, and minimal risk of harm to others.    For any risk assessment that surpasses a \"low\" rating, a safety plan must be developed.    A safety plan was indicated: no  If yes, describe in detail N/A    PLAN: Between sessions, Jaguar Wilde will continue to identify what he has control over. At the next session, the therapist will use Cognitive Behavioral Therapy, Mindfulness-based Strategies, Supportive Psychotherapy, and ACT  to maintain  his progress towards managing his anxiety.  After next session we will move to discharge due to progress    Behavioral Health Treatment Plan and Discharge Planning: Jaguar Wilde is aware of and agrees to continue to work on their treatment plan. They have identified and are working toward their discharge goals. yes    Depression Follow-up Plan Completed: Not applicable    Visit start and stop times:    01/23/25  Start Time: 1528  Stop Time: 1621  Total Visit Time: 53 minutes                                                     "

## 2025-01-28 DIAGNOSIS — Z00.00 MEDICARE ANNUAL WELLNESS VISIT, SUBSEQUENT: ICD-10-CM

## 2025-01-28 DIAGNOSIS — G25.81 RESTLESS LEGS SYNDROME: ICD-10-CM

## 2025-01-29 RX ORDER — CLONAZEPAM 0.25 MG/1
0.25 TABLET, ORALLY DISINTEGRATING ORAL 2 TIMES DAILY
Qty: 60 TABLET | Refills: 0 | Status: SHIPPED | OUTPATIENT
Start: 2025-01-29

## 2025-02-20 ENCOUNTER — SOCIAL WORK (OUTPATIENT)
Dept: BEHAVIORAL/MENTAL HEALTH CLINIC | Facility: CLINIC | Age: 75
End: 2025-02-20
Payer: MEDICARE

## 2025-02-20 ENCOUNTER — DOCUMENTATION (OUTPATIENT)
Dept: BEHAVIORAL/MENTAL HEALTH CLINIC | Facility: CLINIC | Age: 75
End: 2025-02-20

## 2025-02-20 DIAGNOSIS — F41.1 GENERALIZED ANXIETY DISORDER: Primary | ICD-10-CM

## 2025-02-20 DIAGNOSIS — F33.9 DEPRESSION, RECURRENT (HCC): Primary | ICD-10-CM

## 2025-02-20 DIAGNOSIS — F33.9 DEPRESSION, RECURRENT (HCC): ICD-10-CM

## 2025-02-20 DIAGNOSIS — F41.1 GENERALIZED ANXIETY DISORDER: ICD-10-CM

## 2025-02-20 PROCEDURE — 90837 PSYTX W PT 60 MINUTES: CPT | Performed by: SOCIAL WORKER

## 2025-02-20 NOTE — PSYCH
ASSESSMENT:    ICD-10-CM    1. Skin ulcer with fat layer exposed (H)  L98.492       2. Cellulitis of abdominal wall  L03.311       3. Need for tetanus, diphtheria, and acellular pertussis (Tdap) vaccine  Z23 GH IMM - TDAP (ADACEL, BOOSTRIX)      4. 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 History of Guillain-Waddington syndrome  Z86.69       6. Severe bipolar I disorder, current or most recent episode mixed (H)  F31.63       7. Trichimoniasis  A59.9           PLAN:  Discussed with patient that she has staff aureus and is on appropriate antibiotics.  She will finish out course of antibiotics.  She is also on treatment for trichomonas.  Urine culture with mixed lashell.  Discussed with patient that brown recluse spiders are not native to [place] and it would be rare that she would be bit by this type of spider.  She will monitor for recurrent lesions.  She does have past medical history of Behcet's disease which could cause vasculitis and skin ulcerations such as erythema nodosum and pyoderma gangrenosum.  This lesion does not have the typical peripheral halo as would be seen with pyoderma.  Tdap is updated.  She has history of Guillain-Barré syndrome in 1999 which was thought to be related to RSV infection.  It was not around the time of any vaccines.  She did have last Tdap in 2008 and has had COVID vaccines.  This is all reviewed and discussed with patient.  Monitor closely for any neurologic concerns.  I also spoke with pharmacist who agreed with plan of care and updating Tdap.  Dressing change every other day.  Cleanse with Anasept, cut Maxorb AG to size and placed into wound followed by 4 inch Allevyn gentle border light dressing.  Change sooner if saturates through dressing.  Follow-up in wound clinic in 1 week sooner with any concerns.    Time spent today on history intake, record review, wound examination and management, reviewing lab and diagnostic studies, counseling and care coordination: 43  Behavioral Health Psychotherapy Progress Note    Psychotherapy Provided: Individual Psychotherapy     1. Depression, recurrent (HCC)        2. Generalized anxiety disorder            Goals addressed in session: Goal 1     DATA: Ravi states that his daughter in law in [place] moved out of the house, and she does not want Ravi's son to know where she lives.  She is dictating to him what is going on.  He and his wife are struggling with this.  His son's wife will give their son a calendar about when he will see the children.  His daughter in law keeps calling the police on him and she keeps accusing him of drinking.  His wife will send the police to do a welfare check.  We processed that his daughter in law has some mental health issues.  His daughter in law got an apartment so she has not been at the house with him.  Ravi is appropriately dressed in a casual manner,   Ravi says he has good days and bad days and the bad days are lessening in frequency and intensity.  His daughter in law kicked the door in at their house when it was locked.  He would love to go for a visit but he cannot imagine that she would let them see the kids while they were there. He is not as worried or as stressed out anymore, but gets that way if he does not hear from his son in two days.        During this session, this clinician used the following therapeutic modalities: Cognitive Behavioral Therapy, Mindfulness-based Strategies, Supportive Psychotherapy, and ACT    Substance Abuse was not addressed during this session. If the client is diagnosed with a co-occurring substance use disorder, please indicate any changes in the frequency or amount of use: N/A. Stage of change for addressing substance use diagnoses: No substance use/Not applicable    ASSESSMENT:  Jaguar Wilde presents with a Euthymic/ normal mood his affect is Normal range and intensity, which is congruent, with his mood and the content of the session. The client has made  "progress on their goals.     Jaguar Wilde presents with a minimal risk of suicide, minimal risk of self-harm, and minimal risk of harm to others.    For any risk assessment that surpasses a \"low\" rating, a safety plan must be developed.    A safety plan was indicated: no  If yes, describe in detail N/A    PLAN: Between sessions, Jaguar Wilde will be discharged due to improvements in his mood and acceptance. At the next session, the therapist will use  discharge   to address his discharge.    Behavioral Health Treatment Plan and Discharge Planning: Jaguar Wilde is aware of and agrees to continue to work on their treatment plan. They have identified and are working toward their discharge goals. yes    Depression Follow-up Plan Completed: Not applicable    Visit start and stop times:    02/20/25  Start Time: 1340                                 " minutes.        SUBJECTIVE:    Patient is seen today for wound consultation as requested by Darleen Steven NP.  Patient was seen in emergency department 2 days ago for a sore on her abdomen which started March 17, 2024.  She initially noticed some itching in the area and then a small bump developed.  It became red-yellow and then black.  The redness began spreading across her abdomen and it was tender.  She was not feverish but she was extremely tired.  She had been down visiting some friends in the [facility].  She has no prior history of MRSA skin infection.  She has history of Behcet's which was around 20 years ago.  She had oral and genital ulcers.  She states it went away and she never had any further problems.  She has some occasional arthritis pain in her knees.  Denies IV drug use.  Has previous history of drug use and severe bipolar 1 disorder.  At ED she was also found to have trichomonas infection.  Workup in ED showed cellulitis and wound culture has grown staff aureus resistant to erythromycin and azithromycin.  She currently is taking doxycycline, Keflex and metronidazole.  She was treated with IV antibiotics at ED.  She is finding improvement but reports that the skin lesion is draining large amount of yellow-brown fluid and she has needed to change the dressing every day.  It is painful about a 4 out of 10.  She had initially thought maybe she had been bit by a bug.  She was concerned because years ago her mother had been bit by a brown recluse spider.  He had recently been carrying and wood.         PROBLEM LIST:  Patient Active Problem List   Diagnosis    Bipolar I disorder (H)    Severe episode of recurrent major depressive disorder, with psychotic features (H)    Mixed obsessional thoughts and acts    Paroxysmal supraventricular tachycardia    Cocaine abuse (H)    Behcet's disease (H)    Positive FIT (fecal immunochemical test)    History of Guillain-Osco syndrome    Hx of venereal warts     "Hepatitis C antibody positive in blood    Palpitations    Tachycardia    Abnormal electrocardiogram    History of cocaine abuse (H)    Drug abuse, nondependent (H)    Obesity    Impulse control disorder    Primary osteoarthritis of both knees    Severe bipolar I disorder, current or most recent episode mixed (H)     PAST MEDICAL HISTORY:No past medical history on file.  SURGICAL HISTORY:  No past surgical history on file.    SOCIAL HISTORY:  Social History     Socioeconomic History    Marital status: Single     Spouse name: Not on file    Number of children: Not on file    Years of education: Not on file    Highest education level: Not on file   Occupational History    Not on file   Tobacco Use    Smoking status: Every Day     Packs/day: .5     Types: Cigarettes    Smokeless tobacco: Former   Vaping Use    Vaping Use: Never used   Substance and Sexual Activity    Alcohol use: Not Currently    Drug use: Not Currently     Types: \"Crack\" cocaine    Sexual activity: Yes     Partners: Male   Other Topics Concern    Not on file   Social History Narrative    Not on file     Social Determinants of Health     Financial Resource Strain: Low Risk  (1/3/2024)    Financial Resource Strain     Within the past 12 months, have you or your family members you live with been unable to get utilities (heat, electricity) when it was really needed?: No   Food Insecurity: Low Risk  (1/3/2024)    Food Insecurity     Within the past 12 months, did you worry that your food would run out before you got money to buy more?: No     Within the past 12 months, did the food you bought just not last and you didn t have money to get more?: No   Transportation Needs: Low Risk  (1/3/2024)    Transportation Needs     Within the past 12 months, has lack of transportation kept you from medical appointments, getting your medicines, non-medical meetings or appointments, work, or from getting things that you need?: No   Physical Activity: Not on file "   Stress: Not on file   Social Connections: Not on file   Interpersonal Safety: Low Risk  (3/27/2024)    Interpersonal Safety     Do you feel physically and emotionally safe where you currently live?: Yes     Within the past 12 months, have you been hit, slapped, kicked or otherwise physically hurt by someone?: No     Within the past 12 months, have you been humiliated or emotionally abused in other ways by your partner or ex-partner?: No   Housing Stability: Low Risk  (1/3/2024)    Housing Stability     Do you have housing? : Yes     Are you worried about losing your housing?: No     FAMILYHISTORY:No family history on file.  CURRENT MEDICATIONS:   Current Outpatient Medications   Medication Sig Dispense Refill    buPROPion (WELLBUTRIN XL) 300 MG 24 hr tablet Take 1 tablet by mouth daily at 2 pm      cephALEXin (KEFLEX) 500 MG capsule Take 1 capsule (500 mg) by mouth 4 times daily for 7 days 28 capsule 0    doxycycline hyclate (VIBRAMYCIN) 100 MG capsule Take 1 capsule (100 mg) by mouth 2 times daily for 10 days 20 capsule 0    hydrOXYzine (ATARAX) 50 MG tablet Take 50 mg by mouth 3 times daily as needed      ibuprofen (ADVIL/MOTRIN) 600 MG tablet Take 600 mg by mouth 3 times daily      lamoTRIgine (LAMICTAL) 25 MG tablet Take 25 mg by mouth At Bedtime      metroNIDAZOLE (FLAGYL) 500 MG tablet Take 1 tablet (500 mg) by mouth 2 times daily for 7 days 14 tablet 0    multivitamin w/minerals (THERA-VIT-M) tablet Take 1 tablet by mouth daily      naltrexone (DEPADE/REVIA) 50 MG tablet Take 50 mg by mouth daily      QUEtiapine (SEROQUEL) 100 MG tablet Take 100 mg by mouth      sertraline (ZOLOFT) 50 MG tablet Take 1 tablet by mouth daily at 2 pm      topiramate (TOPAMAX) 25 MG tablet Take 25 mg by mouth 2 times daily      VRAYLAR 1.5 MG capsule        ALLERGIES:  Influenza virus vaccine [influenza virus vaccine] and Hydrocodone    REVIEW OF SYSTEMS:  Review of Systems  Denies fever, chills, shortness of breath, chest  "pain, palpitations, oral and genital ulcers, other skin ulcerations.  See HPI for positive findings    OBJECTIVE:  /88 (BP Location: Right arm, Patient Position: Sitting, Cuff Size: Adult Large)   Pulse (!) 47   Temp 97.6  F (36.4  C) (Tympanic)   Resp 16   Ht 1.727 m (5' 8\")   Wt 98.7 kg (217 lb 9.6 oz)   SpO2 97%   BMI 33.09 kg/m    EXAM:   Pleasant female no acute distress.  Affect normal.  Alert and oriented x 4.  Light pink surrounding ulceration at left mid abdomen regressing from marked outline.  Large amount of serosanguineous nonodorous drainage.  The wound was covered with yellow-black soft slough.  After permission granted by patient 15 blade scalpel is used to sharply debride devitalized tissue exposing subcutaneous tissue.  Postdebridement ulcer measures 2.5 by 3.1 by 0.2 centimeters.  Mild tenderness with wound care.  Wound irrigated with Anasept, Maxorb AG cut to size and placed in wound followed by 4 inch Allevyn gentle border light dressing.    Emergency department notes, laboratory diagnostic studies all reviewed and discussed      Adriana Nassar NP      "

## 2025-02-20 NOTE — PROGRESS NOTES
Psychotherapy Discharge Summary    Preferred Name: Jaguar Wilde  YOB: 1950    Admission date to psychotherapy: 09/20/2024    Referred by: Nenita AVILA    Presenting Problem: depression and anxiety    Course of treatment included : individual therapy     Progress/Outcome of Treatment Goals (brief summary of course of treatment) Ravi is able to recognize normal anxiety from debilitating anxiety and what is in his control and what is not.  He is walking away and letting go of things.  Son lives in [place] and is going through a divorce    Treatment Complications (if any): None    Treatment Progress: good    Current [facility] Psychiatric Provider: None    Discharge Medications include: See Chart    Discharge Date: 02/20/2025    Discharge Diagnosis:   1. Depression, recurrent (HCC)        2. Generalized anxiety disorder            Criteria for Discharge: completed treatment goals and objectives and is no longer in need of services    Aftercare recommendations include (include specific referral names and phone numbers, if appropriate): Call in the future as needed    Prognosis: good

## 2025-02-27 ENCOUNTER — APPOINTMENT (OUTPATIENT)
Dept: LAB | Facility: CLINIC | Age: 75
End: 2025-02-27
Payer: MEDICARE

## 2025-02-27 DIAGNOSIS — R97.20 ELEVATED PROSTATE SPECIFIC ANTIGEN (PSA): ICD-10-CM

## 2025-02-27 LAB
PSA FREE MFR SERPL: 23.96 %
PSA FREE SERPL-MCNC: 1.61 NG/ML
PSA SERPL-MCNC: 6.71 NG/ML (ref 0–4)

## 2025-02-27 PROCEDURE — 84153 ASSAY OF PSA TOTAL: CPT

## 2025-02-27 PROCEDURE — 84154 ASSAY OF PSA FREE: CPT

## 2025-02-27 PROCEDURE — 36415 COLL VENOUS BLD VENIPUNCTURE: CPT

## 2025-02-28 DIAGNOSIS — G25.81 RESTLESS LEGS SYNDROME: ICD-10-CM

## 2025-02-28 DIAGNOSIS — Z00.00 MEDICARE ANNUAL WELLNESS VISIT, SUBSEQUENT: ICD-10-CM

## 2025-02-28 NOTE — TELEPHONE ENCOUNTER
Patient requesting refill(s) of: klonopin 0.25 mg BID    Last filled: 1/29/25 #60 x 0  Last appt: 12/19/24  Next appt: 6/27/25  Pharmacy: [facility] [place]

## 2025-03-04 RX ORDER — CLONAZEPAM 0.25 MG/1
0.25 TABLET, ORALLY DISINTEGRATING ORAL 2 TIMES DAILY
Qty: 60 TABLET | Refills: 0 | Status: SHIPPED | OUTPATIENT
Start: 2025-03-04

## 2025-03-06 ENCOUNTER — LAB REQUISITION (OUTPATIENT)
Dept: LAB | Facility: HOSPITAL | Age: 75
End: 2025-03-06
Payer: MEDICARE

## 2025-03-06 DIAGNOSIS — R31.29 OTHER MICROSCOPIC HEMATURIA: ICD-10-CM

## 2025-03-06 LAB
BILIRUB UR QL STRIP: NEGATIVE
CLARITY UR: CLEAR
COLOR UR: YELLOW
GLUCOSE UR STRIP-MCNC: NEGATIVE MG/DL
HGB UR QL STRIP.AUTO: NEGATIVE
KETONES UR STRIP-MCNC: ABNORMAL MG/DL
LEUKOCYTE ESTERASE UR QL STRIP: NEGATIVE
NITRITE UR QL STRIP: NEGATIVE
PH UR STRIP.AUTO: 6 [PH]
PROT UR STRIP-MCNC: NEGATIVE MG/DL
SP GR UR STRIP.AUTO: 1.02 (ref 1–1.03)
UROBILINOGEN UR STRIP-ACNC: <2 MG/DL

## 2025-03-06 PROCEDURE — 81003 URINALYSIS AUTO W/O SCOPE: CPT | Performed by: UROLOGY

## 2025-04-02 DIAGNOSIS — G25.81 RESTLESS LEGS SYNDROME: ICD-10-CM

## 2025-04-02 DIAGNOSIS — Z00.00 MEDICARE ANNUAL WELLNESS VISIT, SUBSEQUENT: ICD-10-CM

## 2025-04-03 RX ORDER — CLONAZEPAM 0.25 MG/1
0.25 TABLET, ORALLY DISINTEGRATING ORAL 2 TIMES DAILY
Qty: 60 TABLET | Refills: 0 | Status: SHIPPED | OUTPATIENT
Start: 2025-04-03

## 2025-04-15 DIAGNOSIS — N40.0 BENIGN PROSTATIC HYPERPLASIA WITHOUT LOWER URINARY TRACT SYMPTOMS: ICD-10-CM

## 2025-04-16 RX ORDER — TAMSULOSIN HYDROCHLORIDE 0.4 MG/1
0.8 CAPSULE ORAL
Qty: 180 CAPSULE | Refills: 1 | Status: SHIPPED | OUTPATIENT
Start: 2025-04-16

## 2025-04-24 DIAGNOSIS — E03.9 HYPOTHYROIDISM, UNSPECIFIED TYPE: ICD-10-CM

## 2025-04-24 RX ORDER — LEVOTHYROXINE SODIUM 75 UG/1
75 TABLET ORAL DAILY
Qty: 90 TABLET | Refills: 1 | Status: SHIPPED | OUTPATIENT
Start: 2025-04-24

## 2025-04-30 DIAGNOSIS — G25.81 RESTLESS LEGS SYNDROME: ICD-10-CM

## 2025-04-30 DIAGNOSIS — Z00.00 MEDICARE ANNUAL WELLNESS VISIT, SUBSEQUENT: ICD-10-CM

## 2025-04-30 DIAGNOSIS — E03.9 HYPOTHYROIDISM, UNSPECIFIED TYPE: ICD-10-CM

## 2025-05-01 RX ORDER — CLONAZEPAM 0.25 MG/1
0.25 TABLET, ORALLY DISINTEGRATING ORAL 2 TIMES DAILY
Qty: 60 TABLET | Refills: 0 | Status: SHIPPED | OUTPATIENT
Start: 2025-05-01

## 2025-05-01 RX ORDER — LEVOTHYROXINE SODIUM 75 UG/1
75 TABLET ORAL DAILY
Qty: 90 TABLET | Refills: 0 | OUTPATIENT
Start: 2025-05-01

## 2025-05-01 RX ORDER — CLONAZEPAM 0.25 MG/1
0.25 TABLET, ORALLY DISINTEGRATING ORAL 2 TIMES DAILY
Qty: 60 TABLET | Refills: 0 | Status: SHIPPED | OUTPATIENT
Start: 2025-05-01 | End: 2025-05-01 | Stop reason: SDUPTHER

## 2025-05-01 NOTE — TELEPHONE ENCOUNTER
Not a duplicate - [facility] was having issues receiving prescriptiothis morning    E-Prescribing Status: Transmission to pharmacy failed (5/1/2025  9:11 AM EDT)

## 2025-05-06 ENCOUNTER — OFFICE VISIT (OUTPATIENT)
Dept: FAMILY MEDICINE CLINIC | Facility: CLINIC | Age: 75
End: 2025-05-06
Payer: MEDICARE

## 2025-05-06 VITALS
BODY MASS INDEX: 26.4 KG/M2 | DIASTOLIC BLOOD PRESSURE: 60 MMHG | HEIGHT: 63 IN | TEMPERATURE: 98.2 F | HEART RATE: 80 BPM | SYSTOLIC BLOOD PRESSURE: 102 MMHG | OXYGEN SATURATION: 97 % | WEIGHT: 149 LBS

## 2025-05-06 DIAGNOSIS — G25.81 RESTLESS LEGS SYNDROME: ICD-10-CM

## 2025-05-06 DIAGNOSIS — J40 BRONCHITIS: Primary | ICD-10-CM

## 2025-05-06 PROCEDURE — G2211 COMPLEX E/M VISIT ADD ON: HCPCS | Performed by: NURSE PRACTITIONER

## 2025-05-06 PROCEDURE — 99214 OFFICE O/P EST MOD 30 MIN: CPT | Performed by: NURSE PRACTITIONER

## 2025-05-06 RX ORDER — DEXTROMETHORPHAN HYDROBROMIDE AND PROMETHAZINE HYDROCHLORIDE 15; 6.25 MG/5ML; MG/5ML
5 SYRUP ORAL 4 TIMES DAILY PRN
Qty: 240 ML | Refills: 0 | Status: SHIPPED | OUTPATIENT
Start: 2025-05-06

## 2025-05-06 RX ORDER — AZITHROMYCIN 250 MG/1
TABLET, FILM COATED ORAL
Qty: 6 TABLET | Refills: 0 | Status: SHIPPED | OUTPATIENT
Start: 2025-05-06 | End: 2025-05-10

## 2025-05-06 RX ORDER — PREDNISONE 20 MG/1
20 TABLET ORAL 2 TIMES DAILY WITH MEALS
Qty: 10 TABLET | Refills: 0 | Status: SHIPPED | OUTPATIENT
Start: 2025-05-06 | End: 2025-05-11

## 2025-05-06 RX ORDER — CLONAZEPAM 0.25 MG/1
0.25 TABLET, ORALLY DISINTEGRATING ORAL 2 TIMES DAILY
Qty: 60 TABLET | Refills: 0 | Status: SHIPPED | OUTPATIENT
Start: 2025-05-06

## 2025-05-06 NOTE — ASSESSMENT & PLAN NOTE
Resent as patient states pharmacy did not receive when sent on 5/1/25.  Orders:  •  clonazePAM (KlonoPIN) 0.25 MG disintegrating tablet; Take 1 tablet (0.25 mg total) by mouth 2 (two) times a day

## 2025-05-06 NOTE — PROGRESS NOTES
"Name: Jaguar Wilde      : 1950      MRN: [id number]  Encounter Provider: MARGARITA Thomas  Encounter Date: 2025   Encounter department: [facility] PRIMARY CARE  :  Assessment & Plan  Bronchitis    Orders:  •  promethazine-dextromethorphan (PHENERGAN-DM) 6.25-15 mg/5 mL oral syrup; Take 5 mL by mouth 4 (four) times a day as needed for cough  •  predniSONE 20 mg tablet; Take 1 tablet (20 mg total) by mouth 2 (two) times a day with meals for 5 days  •  azithromycin (ZITHROMAX) 250 mg tablet; Take 2 tablets today then 1 tablet daily x 4 days    Restless legs syndrome  Resent as patient states pharmacy did not receive when sent on 25.  Orders:  •  clonazePAM (KlonoPIN) 0.25 MG disintegrating tablet; Take 1 tablet (0.25 mg total) by mouth 2 (two) times a day           History of Present Illness   Patient here for a sick visit with symptoms starting over the weekend.  Has been having watering eye, congestion, rhinorrhea, coughing, post nasal drip, productive cough,decreased appetite. Has been taking claritin, halls cough drops and promethazine Dm as needed with some improvement.       Review of Systems   Constitutional:  Positive for fatigue. Negative for chills and fever.   HENT:  Positive for congestion, postnasal drip, rhinorrhea and sore throat.    Respiratory:  Positive for cough, shortness of breath and wheezing.    Neurological: Negative.  Negative for dizziness, light-headedness and headaches.       Objective   /60   Pulse 80   Temp 98.2 °F (36.8 °C)   Ht 5' 3\" (1.6 m)   Wt 67.6 kg (149 lb)   SpO2 97%   BMI 26.39 kg/m²      Physical Exam  Vitals and nursing note reviewed.   Constitutional:       Appearance: Normal appearance.   Cardiovascular:      Rate and Rhythm: Normal rate and regular rhythm.      Heart sounds: No murmur heard.  Pulmonary:      Effort: Pulmonary effort is normal. No respiratory distress.      Breath sounds: Normal breath sounds. "   Neurological:      Mental Status: He is alert.

## 2025-05-27 ENCOUNTER — APPOINTMENT (OUTPATIENT)
Dept: LAB | Facility: CLINIC | Age: 75
End: 2025-05-27
Attending: UROLOGY
Payer: MEDICARE

## 2025-05-27 DIAGNOSIS — K21.00 GASTROESOPHAGEAL REFLUX DISEASE WITH ESOPHAGITIS, UNSPECIFIED WHETHER HEMORRHAGE: ICD-10-CM

## 2025-05-27 DIAGNOSIS — Z00.00 MEDICARE ANNUAL WELLNESS VISIT, SUBSEQUENT: ICD-10-CM

## 2025-05-27 DIAGNOSIS — R97.20 ELEVATED PROSTATE SPECIFIC ANTIGEN (PSA): ICD-10-CM

## 2025-05-27 DIAGNOSIS — R73.01 IMPAIRED FASTING GLUCOSE: ICD-10-CM

## 2025-05-27 DIAGNOSIS — E03.8 OTHER SPECIFIED HYPOTHYROIDISM: ICD-10-CM

## 2025-05-27 LAB
PSA FREE MFR SERPL: 21.23 %
PSA FREE SERPL-MCNC: 1.45 NG/ML
PSA SERPL-MCNC: 6.81 NG/ML (ref 0–4)

## 2025-05-27 PROCEDURE — 84153 ASSAY OF PSA TOTAL: CPT

## 2025-05-27 PROCEDURE — 36415 COLL VENOUS BLD VENIPUNCTURE: CPT

## 2025-05-27 PROCEDURE — 84154 ASSAY OF PSA FREE: CPT

## 2025-06-04 DIAGNOSIS — E78.5 HYPERLIPIDEMIA, UNSPECIFIED HYPERLIPIDEMIA TYPE: ICD-10-CM

## 2025-06-04 DIAGNOSIS — G25.81 RESTLESS LEGS SYNDROME: ICD-10-CM

## 2025-06-04 DIAGNOSIS — K59.00 CONSTIPATION, UNSPECIFIED CONSTIPATION TYPE: ICD-10-CM

## 2025-06-05 DIAGNOSIS — G25.81 RESTLESS LEGS SYNDROME: ICD-10-CM

## 2025-06-05 RX ORDER — DOCUSATE SODIUM 100 MG/1
100 CAPSULE, LIQUID FILLED ORAL DAILY
Qty: 100 CAPSULE | Refills: 1 | Status: SHIPPED | OUTPATIENT
Start: 2025-06-05

## 2025-06-05 RX ORDER — SIMVASTATIN 40 MG
40 TABLET ORAL
Qty: 90 TABLET | Refills: 1 | Status: SHIPPED | OUTPATIENT
Start: 2025-06-05

## 2025-06-06 RX ORDER — CLONAZEPAM 0.25 MG/1
0.25 TABLET, ORALLY DISINTEGRATING ORAL 2 TIMES DAILY
Qty: 60 TABLET | Refills: 0 | Status: SHIPPED | OUTPATIENT
Start: 2025-06-06

## 2025-06-06 RX ORDER — CLONAZEPAM 0.25 MG/1
TABLET, ORALLY DISINTEGRATING ORAL
Qty: 60 TABLET | Refills: 0 | Status: SHIPPED | OUTPATIENT
Start: 2025-06-06

## 2025-06-17 ENCOUNTER — APPOINTMENT (OUTPATIENT)
Dept: LAB | Facility: CLINIC | Age: 75
End: 2025-06-17
Payer: MEDICARE

## 2025-06-17 LAB
ALBUMIN SERPL BCG-MCNC: 4.3 G/DL (ref 3.5–5)
ALP SERPL-CCNC: 42 U/L (ref 34–104)
ALT SERPL W P-5'-P-CCNC: 22 U/L (ref 7–52)
ANION GAP SERPL CALCULATED.3IONS-SCNC: 7 MMOL/L (ref 4–13)
AST SERPL W P-5'-P-CCNC: 21 U/L (ref 13–39)
BASOPHILS # BLD AUTO: 0.04 THOUSANDS/ÂΜL (ref 0–0.1)
BASOPHILS NFR BLD AUTO: 1 % (ref 0–1)
BILIRUB SERPL-MCNC: 0.61 MG/DL (ref 0.2–1)
BUN SERPL-MCNC: 14 MG/DL (ref 5–25)
CALCIUM SERPL-MCNC: 9.3 MG/DL (ref 8.4–10.2)
CHLORIDE SERPL-SCNC: 104 MMOL/L (ref 96–108)
CO2 SERPL-SCNC: 28 MMOL/L (ref 21–32)
CREAT SERPL-MCNC: 0.77 MG/DL (ref 0.6–1.3)
EOSINOPHIL # BLD AUTO: 0.09 THOUSAND/ÂΜL (ref 0–0.61)
EOSINOPHIL NFR BLD AUTO: 2 % (ref 0–6)
ERYTHROCYTE [DISTWIDTH] IN BLOOD BY AUTOMATED COUNT: 12.8 % (ref 11.6–15.1)
EST. AVERAGE GLUCOSE BLD GHB EST-MCNC: 114 MG/DL
GFR SERPL CREATININE-BSD FRML MDRD: 90 ML/MIN/1.73SQ M
GLUCOSE P FAST SERPL-MCNC: 93 MG/DL (ref 65–99)
HBA1C MFR BLD: 5.6 %
HCT VFR BLD AUTO: 46.8 % (ref 36.5–49.3)
HGB BLD-MCNC: 15.7 G/DL (ref 12–17)
IMM GRANULOCYTES # BLD AUTO: 0.04 THOUSAND/UL (ref 0–0.2)
IMM GRANULOCYTES NFR BLD AUTO: 1 % (ref 0–2)
LYMPHOCYTES # BLD AUTO: 1.79 THOUSANDS/ÂΜL (ref 0.6–4.47)
LYMPHOCYTES NFR BLD AUTO: 31 % (ref 14–44)
MCH RBC QN AUTO: 31.8 PG (ref 26.8–34.3)
MCHC RBC AUTO-ENTMCNC: 33.5 G/DL (ref 31.4–37.4)
MCV RBC AUTO: 95 FL (ref 82–98)
MONOCYTES # BLD AUTO: 0.52 THOUSAND/ÂΜL (ref 0.17–1.22)
MONOCYTES NFR BLD AUTO: 9 % (ref 4–12)
NEUTROPHILS # BLD AUTO: 3.38 THOUSANDS/ÂΜL (ref 1.85–7.62)
NEUTS SEG NFR BLD AUTO: 56 % (ref 43–75)
NRBC BLD AUTO-RTO: 0 /100 WBCS
PLATELET # BLD AUTO: 168 THOUSANDS/UL (ref 149–390)
PMV BLD AUTO: 9.5 FL (ref 8.9–12.7)
POTASSIUM SERPL-SCNC: 4 MMOL/L (ref 3.5–5.3)
PROT SERPL-MCNC: 6.8 G/DL (ref 6.4–8.4)
RBC # BLD AUTO: 4.93 MILLION/UL (ref 3.88–5.62)
SODIUM SERPL-SCNC: 139 MMOL/L (ref 135–147)
TSH SERPL DL<=0.05 MIU/L-ACNC: 2.92 UIU/ML (ref 0.45–4.5)
WBC # BLD AUTO: 5.86 THOUSAND/UL (ref 4.31–10.16)

## 2025-06-17 PROCEDURE — 84443 ASSAY THYROID STIM HORMONE: CPT

## 2025-06-17 PROCEDURE — 80053 COMPREHEN METABOLIC PANEL: CPT

## 2025-06-17 PROCEDURE — 85025 COMPLETE CBC W/AUTO DIFF WBC: CPT

## 2025-06-17 PROCEDURE — 83036 HEMOGLOBIN GLYCOSYLATED A1C: CPT

## 2025-06-27 ENCOUNTER — OFFICE VISIT (OUTPATIENT)
Dept: FAMILY MEDICINE CLINIC | Facility: CLINIC | Age: 75
End: 2025-06-27
Payer: MEDICARE

## 2025-06-27 VITALS
HEIGHT: 63 IN | RESPIRATION RATE: 18 BRPM | OXYGEN SATURATION: 97 % | TEMPERATURE: 97.2 F | SYSTOLIC BLOOD PRESSURE: 100 MMHG | DIASTOLIC BLOOD PRESSURE: 78 MMHG | WEIGHT: 150.5 LBS | HEART RATE: 80 BPM | BODY MASS INDEX: 26.67 KG/M2

## 2025-06-27 DIAGNOSIS — G25.81 RESTLESS LEGS SYNDROME: ICD-10-CM

## 2025-06-27 DIAGNOSIS — F41.1 GENERALIZED ANXIETY DISORDER: ICD-10-CM

## 2025-06-27 DIAGNOSIS — K21.00 GASTROESOPHAGEAL REFLUX DISEASE WITH ESOPHAGITIS, UNSPECIFIED WHETHER HEMORRHAGE: Primary | ICD-10-CM

## 2025-06-27 DIAGNOSIS — E78.00 HYPERCHOLESTEROLEMIA: ICD-10-CM

## 2025-06-27 DIAGNOSIS — E03.8 OTHER SPECIFIED HYPOTHYROIDISM: ICD-10-CM

## 2025-06-27 PROCEDURE — 99214 OFFICE O/P EST MOD 30 MIN: CPT | Performed by: NURSE PRACTITIONER

## 2025-06-27 PROCEDURE — G2211 COMPLEX E/M VISIT ADD ON: HCPCS | Performed by: NURSE PRACTITIONER

## 2025-06-27 NOTE — PROGRESS NOTES
"Name: Jaguar Wilde      : 1950      MRN: [id number]  Encounter Provider: MARGARITA Thomas  Encounter Date: 2025   Encounter department: [facility] PRIMARY CARE  :  Assessment & Plan  Gastroesophageal reflux disease with esophagitis, unspecified whether hemorrhage    Orders:  •  CBC and differential; Future    Hypercholesterolemia    Orders:  •  Comprehensive metabolic panel; Future  •  Lipid panel; Future    Restless legs syndrome         Generalized anxiety disorder         Other specified hypothyroidism    Orders:  •  TSH, 3rd generation with Free T4 reflex; Future          Depression Screening and Follow-up Plan: Patient's depression screening was positive with a PHQ-9 score of 7.   Patient assessed for underlying major depression. Brief counseling provided and recommend additional follow-up/re-evaluation next office visit.       History of Present Illness   Patient here for routine follow up visit. Anxiety improving with therapy.  Continues zoloft daily. Continues with family issues.       Review of Systems   Constitutional: Negative.    Respiratory: Negative.  Negative for shortness of breath and wheezing.    Cardiovascular: Negative.  Negative for chest pain.   Gastrointestinal: Negative.  Negative for abdominal pain.   Skin: Negative.  Negative for rash.   Neurological: Negative.  Negative for dizziness, light-headedness and headaches.   Psychiatric/Behavioral: Negative.  Negative for dysphoric mood.        Objective   /78   Pulse 80   Temp (!) 97.2 °F (36.2 °C)   Resp 18   Ht 5' 3\" (1.6 m)   Wt 68.3 kg (150 lb 8 oz)   SpO2 97%   BMI 26.66 kg/m²      Physical Exam  Vitals and nursing note reviewed.   Constitutional:       General: He is not in acute distress.     Appearance: He is well-developed. He is not diaphoretic.     Cardiovascular:      Rate and Rhythm: Normal rate and regular rhythm.      Heart sounds: Normal heart sounds. No murmur heard.  Pulmonary: "      Effort: Pulmonary effort is normal. No respiratory distress.      Breath sounds: Normal breath sounds.   Abdominal:      General: Bowel sounds are normal.     Musculoskeletal:         General: Normal range of motion.     Skin:     General: Skin is warm and dry.      Capillary Refill: Capillary refill takes less than 2 seconds.      Findings: No erythema or rash.     Neurological:      General: No focal deficit present.      Mental Status: He is alert.     Psychiatric:         Behavior: Behavior normal. Behavior is cooperative.         Thought Content: Thought content normal.

## 2025-07-03 DIAGNOSIS — G25.81 RESTLESS LEGS SYNDROME: ICD-10-CM

## 2025-07-03 RX ORDER — CLONAZEPAM 0.25 MG/1
0.25 TABLET, ORALLY DISINTEGRATING ORAL 2 TIMES DAILY
Qty: 60 TABLET | Refills: 0 | Status: SHIPPED | OUTPATIENT
Start: 2025-07-03

## 2025-07-21 DIAGNOSIS — N40.0 BENIGN PROSTATIC HYPERPLASIA WITHOUT LOWER URINARY TRACT SYMPTOMS: ICD-10-CM

## 2025-07-22 DIAGNOSIS — N40.0 BENIGN PROSTATIC HYPERPLASIA WITHOUT LOWER URINARY TRACT SYMPTOMS: ICD-10-CM

## 2025-07-22 RX ORDER — FINASTERIDE 5 MG/1
5 TABLET, FILM COATED ORAL DAILY
Qty: 90 TABLET | Refills: 1 | Status: SHIPPED | OUTPATIENT
Start: 2025-07-22 | End: 2025-07-23 | Stop reason: SDUPTHER

## 2025-07-23 ENCOUNTER — NURSE TRIAGE (OUTPATIENT)
Age: 75
End: 2025-07-23

## 2025-07-23 DIAGNOSIS — N40.0 BENIGN PROSTATIC HYPERPLASIA WITHOUT LOWER URINARY TRACT SYMPTOMS: ICD-10-CM

## 2025-07-23 RX ORDER — FINASTERIDE 5 MG/1
5 TABLET, FILM COATED ORAL DAILY
Qty: 90 TABLET | Refills: 1 | Status: SHIPPED | OUTPATIENT
Start: 2025-07-23

## 2025-07-23 NOTE — TELEPHONE ENCOUNTER
"Suni Wilde (proxy for Jaguar Wilde) to  [facility] (supporting MARGARITA Thomas) (Selected Message) 7/22/25  4:29 PM  Jaguar's prescription for Finasteride, 5mg tablet, once daily, quantity 90.     My Chart shows it was sent to [facility] in [place].  It should go to [facility] in [place].  Thank you for taking care of this.  Jaguar Wilde        REASON FOR CONVERSATION: Medication Problem    SYMPTOMS: MyChart message received. Patient requests that finasteride prescription is sent to [facility], not [facility].    OTHER HEALTH INFORMATION: Finasteride prescription sent to [facility] by PCP on 7/22    PROTOCOL DISPOSITION: Home Care    CARE ADVICE PROVIDED: Prescription sent to [facility], as requested by patient, per Rx Refill Protocol.    Sent to pharmacy (7/23/2025  1:52 PM EDT)     PRACTICE FOLLOW-UP: none      Reason for Disposition   Prescription prescribed recently is not at pharmacy and triager has access to patient's EMR and prescription is recorded in the EMR    Answer Assessment - Initial Assessment Questions  1. NAME of MEDICINE: \"What medicine(s) are you calling about?\"      Finasteride    2. QUESTION: \"What is your question?\" (e.g., double dose of medicine, side effect)      Wrong pharmacy    3. PRESCRIBER: \"Who prescribed the medicine?\" Reason: if prescribed by specialist, call should be referred to that group.      Prescription sent to [facility] by PCP on 7/22    Protocols used: Medication Question Call-Adult-[place]    "

## 2025-07-24 RX ORDER — FINASTERIDE 5 MG/1
5 TABLET, FILM COATED ORAL DAILY
Qty: 90 TABLET | Refills: 0 | OUTPATIENT
Start: 2025-07-24

## 2025-07-30 DIAGNOSIS — N40.0 BENIGN PROSTATIC HYPERPLASIA WITHOUT LOWER URINARY TRACT SYMPTOMS: ICD-10-CM

## 2025-07-30 DIAGNOSIS — G25.81 RESTLESS LEGS SYNDROME: ICD-10-CM

## 2025-07-30 RX ORDER — FINASTERIDE 5 MG/1
5 TABLET, FILM COATED ORAL DAILY
Qty: 90 TABLET | Refills: 1 | Status: SHIPPED | OUTPATIENT
Start: 2025-07-30

## 2025-07-31 RX ORDER — CLONAZEPAM 0.25 MG/1
0.25 TABLET, ORALLY DISINTEGRATING ORAL 2 TIMES DAILY
Qty: 60 TABLET | Refills: 0 | Status: SHIPPED | OUTPATIENT
Start: 2025-07-31

## 2025-08-07 ENCOUNTER — APPOINTMENT (OUTPATIENT)
Dept: LAB | Facility: CLINIC | Age: 75
End: 2025-08-07
Attending: PHYSICIAN ASSISTANT
Payer: MEDICARE

## 2025-08-15 ENCOUNTER — EVALUATION (OUTPATIENT)
Dept: PHYSICAL THERAPY | Facility: CLINIC | Age: 75
End: 2025-08-15
Payer: MEDICARE